# Patient Record
Sex: FEMALE | Race: BLACK OR AFRICAN AMERICAN | Employment: OTHER | URBAN - NONMETROPOLITAN AREA
[De-identification: names, ages, dates, MRNs, and addresses within clinical notes are randomized per-mention and may not be internally consistent; named-entity substitution may affect disease eponyms.]

---

## 2017-02-03 RX ORDER — ATORVASTATIN CALCIUM 40 MG/1
TABLET, FILM COATED ORAL
Qty: 90 TABLET | Refills: 4 | Status: SHIPPED | OUTPATIENT
Start: 2017-02-03 | End: 2018-04-29 | Stop reason: SDUPTHER

## 2017-02-03 RX ORDER — HYDROCHLOROTHIAZIDE 25 MG/1
TABLET ORAL
Qty: 180 TABLET | Refills: 4 | Status: SHIPPED | OUTPATIENT
Start: 2017-02-03 | End: 2018-04-29 | Stop reason: SDUPTHER

## 2017-02-03 RX ORDER — LISINOPRIL 40 MG/1
TABLET ORAL
Qty: 90 TABLET | Refills: 4 | Status: SHIPPED | OUTPATIENT
Start: 2017-02-03 | End: 2018-04-29 | Stop reason: SDUPTHER

## 2017-06-12 RX ORDER — NIFEDIPINE 90 MG/1
TABLET, FILM COATED, EXTENDED RELEASE ORAL
Qty: 180 TABLET | Refills: 1 | Status: SHIPPED | OUTPATIENT
Start: 2017-06-12 | End: 2017-12-11 | Stop reason: SDUPTHER

## 2017-06-23 LAB
ALBUMIN SERPL-MCNC: 3.9 G/DL (ref 3.2–5.3)
ALK PHOSPHATASE: 99 IU/L (ref 35–121)
ALT SERPL-CCNC: 12 IU/L (ref 5–59)
ANION GAP SERPL CALCULATED.3IONS-SCNC: 14 MMOL/L
AST SERPL-CCNC: 13 IU/L (ref 10–42)
BILIRUB SERPL-MCNC: 0.3 MG/DL (ref 0.2–1.3)
BUN BLDV-MCNC: 17 MG/DL (ref 10–20)
CALCIUM SERPL-MCNC: 9.8 MG/DL (ref 8.7–10.8)
CHLORIDE BLD-SCNC: 107 MMOL/L (ref 95–111)
CO2: 27 MMOL/L (ref 21–32)
CREAT SERPL-MCNC: 0.7 MG/DL (ref 0.5–1.3)
CREATINE, URINE: 166.8 MG/DL
EGFR AFRICAN AMERICAN: 101
EGFR IF NONAFRICAN AMERICAN: 84
GLUCOSE: 121 MG/DL (ref 70–100)
MICROALBUMIN/CREAT 24H UR: 1 MG/DL
MICROALBUMIN/CREAT UR-RTO: 6 UG/MG
POTASSIUM SERPL-SCNC: 4.9 MMOL/L (ref 3.5–5.4)
SODIUM BLD-SCNC: 143 MMOL/L (ref 134–147)
TOTAL PROTEIN: 7.2 G/DL (ref 5.8–8)

## 2017-06-26 ENCOUNTER — OFFICE VISIT (OUTPATIENT)
Dept: NEPHROLOGY | Age: 67
End: 2017-06-26

## 2017-06-26 VITALS
SYSTOLIC BLOOD PRESSURE: 132 MMHG | DIASTOLIC BLOOD PRESSURE: 82 MMHG | HEART RATE: 76 BPM | BODY MASS INDEX: 46.77 KG/M2 | OXYGEN SATURATION: 98 % | WEIGHT: 264 LBS

## 2017-06-26 DIAGNOSIS — E08.00 DIABETES MELLITUS DUE TO UNDERLYING CONDITION WITH HYPEROSMOLARITY WITHOUT COMA, UNSPECIFIED LONG TERM INSULIN USE STATUS: Primary | ICD-10-CM

## 2017-06-26 PROCEDURE — 99213 OFFICE O/P EST LOW 20 MIN: CPT | Performed by: INTERNAL MEDICINE

## 2017-06-26 RX ORDER — LOSARTAN POTASSIUM 50 MG/1
TABLET ORAL
Qty: 180 TABLET | Refills: 3 | Status: SHIPPED | OUTPATIENT
Start: 2017-06-26 | End: 2018-06-10 | Stop reason: SDUPTHER

## 2017-06-26 ASSESSMENT — ENCOUNTER SYMPTOMS
GASTROINTESTINAL NEGATIVE: 1
CONSTIPATION: 0
NAUSEA: 0
CHEST TIGHTNESS: 0
ABDOMINAL PAIN: 0
BACK PAIN: 0
FACIAL SWELLING: 0
DIARRHEA: 0
WHEEZING: 0
SORE THROAT: 0
SHORTNESS OF BREATH: 0
COUGH: 0
ABDOMINAL DISTENTION: 0
RESPIRATORY NEGATIVE: 1
BLOOD IN STOOL: 0
COLOR CHANGE: 0
VOMITING: 0

## 2017-08-29 RX ORDER — METOPROLOL TARTRATE 100 MG/1
TABLET ORAL
Qty: 180 TABLET | Refills: 1 | Status: SHIPPED | OUTPATIENT
Start: 2017-08-29 | End: 2018-02-25 | Stop reason: SDUPTHER

## 2017-10-31 ENCOUNTER — TELEPHONE (OUTPATIENT)
Dept: NEPHROLOGY | Age: 67
End: 2017-10-31

## 2017-11-07 ENCOUNTER — HOSPITAL ENCOUNTER (OUTPATIENT)
Dept: GENERAL RADIOLOGY | Age: 67
Discharge: HOME | End: 2017-11-07
Payer: MEDICARE

## 2017-11-07 ENCOUNTER — HOSPITAL ENCOUNTER (OUTPATIENT)
Age: 67
Discharge: HOME | End: 2017-11-07
Payer: MEDICARE

## 2017-11-07 ENCOUNTER — OFFICE VISIT (OUTPATIENT)
Dept: NEPHROLOGY | Age: 67
End: 2017-11-07
Payer: MEDICARE

## 2017-11-07 VITALS
HEART RATE: 78 BPM | BODY MASS INDEX: 47.65 KG/M2 | SYSTOLIC BLOOD PRESSURE: 128 MMHG | DIASTOLIC BLOOD PRESSURE: 78 MMHG | WEIGHT: 269 LBS | OXYGEN SATURATION: 98 %

## 2017-11-07 DIAGNOSIS — N18.2 CHRONIC KIDNEY DISEASE, STAGE II (MILD): Primary | ICD-10-CM

## 2017-11-07 DIAGNOSIS — N18.2 CHRONIC KIDNEY DISEASE, STAGE II (MILD): ICD-10-CM

## 2017-11-07 LAB
ANION GAP SERPL CALCULATED.3IONS-SCNC: 14 MEQ/L (ref 8–16)
BACTERIA URINE, POC: ABNORMAL
BILIRUBIN URINE: ABNORMAL MG/DL
BLOOD, URINE: POSITIVE
BUN BLDV-MCNC: 14 MG/DL (ref 7–22)
CALCIUM SERPL-MCNC: 9.9 MG/DL (ref 8.5–10.5)
CASTS URINE, POC: ABNORMAL
CHLORIDE BLD-SCNC: 100 MEQ/L (ref 98–111)
CLARITY: CLEAR
CO2: 25 MEQ/L (ref 23–33)
COLOR: YELLOW
CREAT SERPL-MCNC: 0.7 MG/DL (ref 0.4–1.2)
CREATININE, URINE: 24.7 MG/DL
CRYSTALS URINE, POC: ABNORMAL
EPI CELLS URINE, POC: ABNORMAL
GFR SERPL CREATININE-BSD FRML MDRD: > 90 ML/MIN/1.73M2
GLUCOSE BLD-MCNC: 124 MG/DL (ref 70–108)
GLUCOSE URINE: NEGATIVE
HCT VFR BLD CALC: 44.2 % (ref 37–47)
HEMOGLOBIN: 14.6 GM/DL (ref 12–16)
KETONES, URINE: NEGATIVE
LEUKOCYTE EST, POC: NEGATIVE
MCH RBC QN AUTO: 31.2 PG (ref 27–31)
MCHC RBC AUTO-ENTMCNC: 33.2 GM/DL (ref 33–37)
MCV RBC AUTO: 94 FL (ref 81–99)
MICROALBUMIN UR-MCNC: < 1.2 MG/DL
MICROALBUMIN/CREAT UR-RTO: 49 MG/G (ref 0–30)
NITRITE, URINE: NEGATIVE
PDW BLD-RTO: 15 % (ref 11.5–14.5)
PH UA: 5 (ref 4.5–8)
PLATELET # BLD: 299 THOU/MM3 (ref 130–400)
PMV BLD AUTO: 9.5 MCM (ref 7.4–10.4)
POTASSIUM SERPL-SCNC: 4 MEQ/L (ref 3.5–5.2)
PROTEIN UA: POSITIVE
RBC # BLD: 4.7 MILL/MM3 (ref 4.2–5.4)
RBC URINE, POC: ABNORMAL
SODIUM BLD-SCNC: 139 MEQ/L (ref 135–145)
SPECIFIC GRAVITY UA: ABNORMAL (ref 1–1.03)
UROBILINOGEN, URINE: ABNORMAL
WBC # BLD: 7.4 THOU/MM3 (ref 4.8–10.8)
WBC URINE, POC: ABNORMAL
YEAST URINE, POC: ABNORMAL

## 2017-11-07 PROCEDURE — 82043 UR ALBUMIN QUANTITATIVE: CPT

## 2017-11-07 PROCEDURE — 36415 COLL VENOUS BLD VENIPUNCTURE: CPT

## 2017-11-07 PROCEDURE — 99213 OFFICE O/P EST LOW 20 MIN: CPT | Performed by: INTERNAL MEDICINE

## 2017-11-07 PROCEDURE — 93005 ELECTROCARDIOGRAM TRACING: CPT

## 2017-11-07 PROCEDURE — 71020 XR CHEST STANDARD TWO VW: CPT

## 2017-11-07 PROCEDURE — 85027 COMPLETE CBC AUTOMATED: CPT

## 2017-11-07 PROCEDURE — 81000 URINALYSIS NONAUTO W/SCOPE: CPT | Performed by: INTERNAL MEDICINE

## 2017-11-07 PROCEDURE — 80048 BASIC METABOLIC PNL TOTAL CA: CPT

## 2017-11-07 ASSESSMENT — ENCOUNTER SYMPTOMS
RESPIRATORY NEGATIVE: 1
GASTROINTESTINAL NEGATIVE: 1

## 2017-11-07 NOTE — PROGRESS NOTES
Visit Date: 11/7/2017      HPI:     Demetria Casper is a 77 y.o. female who presents today for:  Chief Complaint   Patient presents with    Chronic Kidney Disease     Stage II    Hypertension    Diabetes Mellitus       Current Outpatient Prescriptions   Medication Sig Dispense Refill    metoprolol (LOPRESSOR) 100 MG tablet TAKE 1 TABLET TWICE A  tablet 1    losartan (COZAAR) 50 MG tablet TAKE 1 TABLET TWICE A  tablet 3    metFORMIN (GLUCOPHAGE) 500 MG tablet TAKE 1 TABLET TWICE A DAY WITH MEALS 180 tablet 1    NIFEdipine (ADALAT CC) 90 MG extended release tablet TAKE 1 TABLET TWICE A  tablet 1    lisinopril (PRINIVIL;ZESTRIL) 40 MG tablet TAKE 1 TABLET DAILY 90 tablet 4    atorvastatin (LIPITOR) 40 MG tablet TAKE 1 TABLET DAILY 90 tablet 4    hydrochlorothiazide (HYDRODIURIL) 25 MG tablet TAKE 1 TABLET TWICE A  tablet 4    aspirin 81 MG tablet Take 81 mg by mouth daily       No current facility-administered medications for this visit. No Known Allergies    Past Medical History:   Diagnosis Date    Hyperlipidemia     Hypertension     Type II or unspecified type diabetes mellitus without mention of complication, not stated as uncontrolled       History reviewed. No pertinent surgical history. History reviewed. No pertinent family history. Social History   Substance Use Topics    Smoking status: Never Smoker    Smokeless tobacco: Never Used    Alcohol use Not on file        Subjective:      Review of Systems   Constitutional: Negative. HENT: Negative. Respiratory: Negative. Cardiovascular:        Fairly physically active  With exercise work out activities   Gastrointestinal: Negative. Genitourinary: Negative. Skin: Negative. Neurological: Negative. Psychiatric/Behavioral: Negative.         Objective:     /78 (Site: Right Arm, Position: Sitting, Cuff Size: Large Adult)   Pulse 78   Wt 289 lb (131.1 kg)   SpO2 98%   BMI 51.19 kg/m²   Home

## 2017-11-08 LAB
EKG ATRIAL RATE: 67 BPM
EKG P AXIS: 47 DEGREES
EKG P-R INTERVAL: 158 MS
EKG Q-T INTERVAL: 384 MS
EKG QRS DURATION: 82 MS
EKG QTC CALCULATION (BAZETT): 405 MS
EKG R AXIS: 40 DEGREES
EKG T AXIS: 30 DEGREES
EKG VENTRICULAR RATE: 67 BPM

## 2017-11-30 ENCOUNTER — TELEPHONE (OUTPATIENT)
Dept: NEPHROLOGY | Age: 67
End: 2017-11-30

## 2017-11-30 RX ORDER — HYDRALAZINE HYDROCHLORIDE 50 MG/1
50 TABLET, FILM COATED ORAL 2 TIMES DAILY
COMMUNITY
End: 2017-12-04 | Stop reason: SDUPTHER

## 2017-11-30 NOTE — TELEPHONE ENCOUNTER
Pt called and states that her BP has been elevated since her knee surgery. The doctor there prescribed hydralazine 50 mg bid and also gave her metoprolol 20 mg bid along with the metoprolol 100 mg bid. Pt states BP's have been slowly decreasing. Can pt continue these medications?   Will you prescribe the hydralazine for the pt?

## 2017-12-04 RX ORDER — HYDRALAZINE HYDROCHLORIDE 50 MG/1
50 TABLET, FILM COATED ORAL 2 TIMES DAILY
Qty: 60 TABLET | Refills: 0 | Status: SHIPPED | OUTPATIENT
Start: 2017-12-04 | End: 2018-06-10 | Stop reason: SDUPTHER

## 2017-12-04 RX ORDER — HYDRALAZINE HYDROCHLORIDE 50 MG/1
50 TABLET, FILM COATED ORAL 2 TIMES DAILY
Qty: 180 TABLET | Refills: 1 | Status: SHIPPED | OUTPATIENT
Start: 2017-12-04 | End: 2017-12-04 | Stop reason: SDUPTHER

## 2017-12-04 NOTE — TELEPHONE ENCOUNTER
I called pt and informed her of this. She understood. 1 month supply sent to [facility] in [facility] for her and the rest sent to [address]

## 2017-12-11 RX ORDER — NIFEDIPINE 90 MG/1
TABLET, FILM COATED, EXTENDED RELEASE ORAL
Qty: 180 TABLET | Refills: 1 | Status: SHIPPED | OUTPATIENT
Start: 2017-12-11 | End: 2018-06-09 | Stop reason: SDUPTHER

## 2018-02-27 RX ORDER — METOPROLOL TARTRATE 100 MG/1
TABLET ORAL
Qty: 180 TABLET | Refills: 1 | Status: SHIPPED | OUTPATIENT
Start: 2018-02-27 | End: 2018-08-24 | Stop reason: SDUPTHER

## 2018-05-07 RX ORDER — HYDROCHLOROTHIAZIDE 25 MG/1
TABLET ORAL
Qty: 180 TABLET | Refills: 1 | Status: SHIPPED | OUTPATIENT
Start: 2018-05-07 | End: 2018-10-30 | Stop reason: SDUPTHER

## 2018-05-07 RX ORDER — LISINOPRIL 40 MG/1
TABLET ORAL
Qty: 90 TABLET | Refills: 1 | Status: SHIPPED | OUTPATIENT
Start: 2018-05-07 | End: 2018-09-17 | Stop reason: ALTCHOICE

## 2018-05-07 RX ORDER — ATORVASTATIN CALCIUM 40 MG/1
TABLET, FILM COATED ORAL
Qty: 90 TABLET | Refills: 1 | Status: SHIPPED | OUTPATIENT
Start: 2018-05-07 | End: 2018-11-09 | Stop reason: SDUPTHER

## 2018-05-22 ENCOUNTER — TELEPHONE (OUTPATIENT)
Dept: NEPHROLOGY | Age: 68
End: 2018-05-22

## 2018-06-11 RX ORDER — NIFEDIPINE 90 MG/1
TABLET, FILM COATED, EXTENDED RELEASE ORAL
Qty: 180 TABLET | Refills: 1 | Status: SHIPPED | OUTPATIENT
Start: 2018-06-11 | End: 2018-09-14 | Stop reason: SDUPTHER

## 2018-06-11 RX ORDER — HYDRALAZINE HYDROCHLORIDE 50 MG/1
TABLET, FILM COATED ORAL
Qty: 180 TABLET | Refills: 1 | Status: SHIPPED | OUTPATIENT
Start: 2018-06-11 | End: 2018-09-14 | Stop reason: SDUPTHER

## 2018-06-11 RX ORDER — LOSARTAN POTASSIUM 50 MG/1
TABLET ORAL
Qty: 180 TABLET | Refills: 1 | Status: SHIPPED | OUTPATIENT
Start: 2018-06-11 | End: 2018-09-14 | Stop reason: SDUPTHER

## 2018-08-24 ENCOUNTER — TELEPHONE (OUTPATIENT)
Dept: NEPHROLOGY | Age: 68
End: 2018-08-24

## 2018-08-24 RX ORDER — METOPROLOL TARTRATE 100 MG/1
TABLET ORAL
Qty: 180 TABLET | Refills: 1 | Status: SHIPPED | OUTPATIENT
Start: 2018-08-24 | End: 2019-02-20 | Stop reason: SDUPTHER

## 2018-08-24 NOTE — TELEPHONE ENCOUNTER
Please approve or refuse Rx request:  Requested Prescriptions     Pending Prescriptions Disp Refills    metoprolol (LOPRESSOR) 100 MG tablet 180 tablet 3     Sig: TAKE 1 TABLET TWICE A DAY       Next appointment:  11/14/2018 spouse

## 2018-09-14 RX ORDER — LISINOPRIL 40 MG/1
TABLET ORAL
Qty: 90 TABLET | Refills: 1 | Status: CANCELLED | OUTPATIENT
Start: 2018-09-14

## 2018-09-14 NOTE — TELEPHONE ENCOUNTER
[facility] to request a refill for the following  Please approve or refuse Rx request:  Requested Prescriptions     Pending Prescriptions Disp Refills    losartan (COZAAR) 50 MG tablet 120 tablet 0     Sig: TAKE 1 TABLET TWICE A DAY    NIFEdipine (ADALAT CC) 90 MG extended release tablet 120 tablet 0     Sig: TAKE 1 TABLET TWICE A DAY    metFORMIN (GLUCOPHAGE) 500 MG tablet 120 tablet 0     Sig: TAKE 1 TABLET TWICE A DAY WITH MEALS    lisinopril (PRINIVIL;ZESTRIL) 40 MG tablet 90 tablet 1     Sig: TAKE 1 TABLET DAILY    hydrALAZINE (APRESOLINE) 50 MG tablet 120 tablet 0     Sig: TAKE 1 TABLET TWICE A DAY       Next appointment:  11/14/2018

## 2018-09-15 RX ORDER — HYDRALAZINE HYDROCHLORIDE 50 MG/1
TABLET, FILM COATED ORAL
Qty: 120 TABLET | Refills: 0 | Status: SHIPPED | OUTPATIENT
Start: 2018-09-15 | End: 2018-11-17 | Stop reason: SDUPTHER

## 2018-09-15 RX ORDER — NIFEDIPINE 90 MG/1
TABLET, FILM COATED, EXTENDED RELEASE ORAL
Qty: 120 TABLET | Refills: 0 | Status: SHIPPED | OUTPATIENT
Start: 2018-09-15 | End: 2018-11-17 | Stop reason: SDUPTHER

## 2018-09-15 RX ORDER — LOSARTAN POTASSIUM 50 MG/1
TABLET ORAL
Qty: 120 TABLET | Refills: 0 | Status: SHIPPED | OUTPATIENT
Start: 2018-09-15 | End: 2018-11-17 | Stop reason: SDUPTHER

## 2018-09-17 DIAGNOSIS — N18.2 CKD (CHRONIC KIDNEY DISEASE), STAGE II: ICD-10-CM

## 2018-09-19 LAB
ANION GAP SERPL CALCULATED.3IONS-SCNC: 12 MEQ/L (ref 10–19)
BUN BLDV-MCNC: 15 MG/DL (ref 8–23)
CALCIUM SERPL-MCNC: 9.8 MG/DL (ref 8.5–10.5)
CHLORIDE BLD-SCNC: 106 MEQ/L (ref 95–107)
CO2: 25 MEQ/L (ref 19–31)
CREAT SERPL-MCNC: 0.9 MG/DL (ref 0.6–1.3)
EGFR AFRICAN AMERICAN: 76.7 ML/MIN/1.73 M2
EGFR IF NONAFRICAN AMERICAN: 66.2 ML/MIN/1.73 M2
GLUCOSE: 147 MG/DL (ref 70–99)
POTASSIUM SERPL-SCNC: 4.6 MEQ/L (ref 3.5–5.4)
SODIUM BLD-SCNC: 143 MEQ/L (ref 135–146)

## 2018-10-04 ENCOUNTER — TELEPHONE (OUTPATIENT)
Dept: NEPHROLOGY | Age: 68
End: 2018-10-04

## 2018-10-04 DIAGNOSIS — Z76.89 ENCOUNTER TO ESTABLISH CARE: Primary | ICD-10-CM

## 2018-10-30 RX ORDER — ATORVASTATIN CALCIUM 40 MG/1
TABLET, FILM COATED ORAL
Qty: 90 TABLET | Refills: 3 | Status: CANCELLED | OUTPATIENT
Start: 2018-10-30

## 2018-10-30 RX ORDER — HYDROCHLOROTHIAZIDE 25 MG/1
25 TABLET ORAL DAILY
Qty: 90 TABLET | Refills: 3 | Status: SHIPPED | OUTPATIENT
Start: 2018-10-30 | End: 2019-10-29 | Stop reason: SDUPTHER

## 2018-10-30 RX ORDER — HYDROCHLOROTHIAZIDE 25 MG/1
TABLET ORAL
Qty: 180 TABLET | Refills: 3 | Status: CANCELLED | OUTPATIENT
Start: 2018-10-30

## 2018-10-30 NOTE — TELEPHONE ENCOUNTER
Please approve or refuse Rx request:  Requested Prescriptions     Pending Prescriptions Disp Refills    atorvastatin (LIPITOR) 40 MG tablet 90 tablet 3     Sig: TAKE 1 TABLET DAILY    hydrochlorothiazide (HYDRODIURIL) 25 MG tablet 180 tablet 3     Sig: TAKE 1 TABLET TWICE A DAY       Next appointment:  11/14/2018  Refill request faxed from pharmacy for the above  Dr Ana Bhatia patient

## 2018-10-31 DIAGNOSIS — N18.2 CKD (CHRONIC KIDNEY DISEASE), STAGE II: Primary | ICD-10-CM

## 2018-10-31 DIAGNOSIS — E78.49 OTHER HYPERLIPIDEMIA: ICD-10-CM

## 2018-11-06 LAB
CHOLESTEROL/HDL RATIO: 3.5
CHOLESTEROL: 236 MG/DL
HDLC SERPL-MCNC: 67.1 MG/DL
LDL CHOLESTEROL CALCULATED: 148 MG/DL
LDL/HDL RATIO: 2.2
TRIGL SERPL-MCNC: 105 MG/DL
VLDLC SERPL CALC-MCNC: 21 MG/DL

## 2018-11-09 RX ORDER — ATORVASTATIN CALCIUM 40 MG/1
TABLET, FILM COATED ORAL
Qty: 90 TABLET | Refills: 0 | Status: SHIPPED | OUTPATIENT
Start: 2018-11-09 | End: 2019-02-08 | Stop reason: SDUPTHER

## 2018-11-14 ENCOUNTER — OFFICE VISIT (OUTPATIENT)
Dept: NEPHROLOGY | Age: 68
End: 2018-11-14
Payer: MEDICARE

## 2018-11-14 VITALS
DIASTOLIC BLOOD PRESSURE: 83 MMHG | HEART RATE: 79 BPM | OXYGEN SATURATION: 99 % | SYSTOLIC BLOOD PRESSURE: 137 MMHG | BODY MASS INDEX: 49.46 KG/M2 | WEIGHT: 279.2 LBS

## 2018-11-14 DIAGNOSIS — I10 ESSENTIAL HYPERTENSION: Primary | ICD-10-CM

## 2018-11-14 DIAGNOSIS — E78.5 DYSLIPIDEMIA: ICD-10-CM

## 2018-11-14 DIAGNOSIS — E11.9 TYPE 2 DIABETES MELLITUS WITHOUT COMPLICATION, WITHOUT LONG-TERM CURRENT USE OF INSULIN (HCC): ICD-10-CM

## 2018-11-14 DIAGNOSIS — R80.9 MICROALBUMINURIA: ICD-10-CM

## 2018-11-14 PROCEDURE — 99214 OFFICE O/P EST MOD 30 MIN: CPT | Performed by: INTERNAL MEDICINE

## 2018-11-14 NOTE — PROGRESS NOTES
[facility]    [facility], [address]   [place] [address]  [phone]  Progress Note  11/14/2018 11:10 AM      Pt Name:    Alee Shaw  YOB: 1950  Primary Care Physician:  No primary care provider on file. Chief Complaint:   Chief Complaint   Patient presents with    Follow-up     Hypertension, DM, dyslipidemia, Microalbuminuria        Background Information/Interval History:   78 yo AAF with hx HTN for several years, obesity who is here for annual follow-up for HTN. She reports hx HTN for over 30+ year. She has had DM for few years and takes metformin. No hx CHF or stents in heart. No PPM or AICD. No hx gross hematuria. No hx kidney stones. Her Mother was on dialysis who passed away in early 36s. No hx cancer. She reports hx TIA several years ago. No chest pain or shortness of breath. She reports BP is usually stays around 120-130. She says BS have not been well controlled- sometimes upto 200. She had Right knee  Replacement Nov 2017. She took some NSAIDs in the past. She still takes it occasionally for chronic back pain. No hx smoking. She used to do office work. This is the first time I am seeing her. She was previously seeing Dr. Chika Brdoy. Patient does not have a PCP     Past History:  Past Medical History:   Diagnosis Date    Hyperlipidemia     Hypertension     Type II or unspecified type diabetes mellitus without mention of complication, not stated as uncontrolled      Past Surgical History:   Procedure Laterality Date    TOTAL KNEE ARTHROPLASTY Right 11/13/2017    [address] [facility] [facility] [address] [facility]        VITALS:  /83 (Site: Right Upper Arm, Position: Sitting, Cuff Size: Large Adult)   Pulse 79   Wt 279 lb 3.2 oz (126.6 kg)   SpO2 99%   BMI 49.46 kg/m²   Wt Readings from Last 3 Encounters:   11/14/18 279 lb 3.2 oz (126.6 kg)   11/07/17 269 lb (122 kg)   06/26/17 264 lb (119.7 kg)     Body mass index is 49.46 kg/m².      General

## 2018-11-17 DIAGNOSIS — N18.2 CKD (CHRONIC KIDNEY DISEASE), STAGE II: Primary | ICD-10-CM

## 2018-11-20 RX ORDER — NIFEDIPINE 90 MG/1
TABLET, FILM COATED, EXTENDED RELEASE ORAL
Qty: 180 TABLET | Refills: 3 | Status: SHIPPED | OUTPATIENT
Start: 2018-11-20 | End: 2019-11-17 | Stop reason: SDUPTHER

## 2018-11-20 RX ORDER — LOSARTAN POTASSIUM 50 MG/1
TABLET ORAL
Qty: 180 TABLET | Refills: 3 | Status: SHIPPED | OUTPATIENT
Start: 2018-11-20 | End: 2019-11-17 | Stop reason: SDUPTHER

## 2018-11-20 RX ORDER — HYDRALAZINE HYDROCHLORIDE 50 MG/1
TABLET, FILM COATED ORAL
Qty: 180 TABLET | Refills: 3 | Status: SHIPPED | OUTPATIENT
Start: 2018-11-20 | End: 2019-11-17 | Stop reason: SDUPTHER

## 2018-12-06 ENCOUNTER — HOSPITAL ENCOUNTER (OUTPATIENT)
Dept: ULTRASOUND IMAGING | Age: 68
Discharge: HOME | End: 2018-12-06
Payer: MEDICARE

## 2018-12-06 DIAGNOSIS — I10 ESSENTIAL HYPERTENSION: ICD-10-CM

## 2018-12-06 PROCEDURE — 93975 VASCULAR STUDY: CPT

## 2019-01-14 ENCOUNTER — OFFICE VISIT (OUTPATIENT)
Dept: FAMILY MEDICINE CLINIC | Age: 69
End: 2019-01-14
Payer: MEDICARE

## 2019-01-14 VITALS
DIASTOLIC BLOOD PRESSURE: 74 MMHG | BODY MASS INDEX: 48.37 KG/M2 | HEART RATE: 83 BPM | HEIGHT: 63 IN | TEMPERATURE: 97.9 F | OXYGEN SATURATION: 97 % | SYSTOLIC BLOOD PRESSURE: 134 MMHG | WEIGHT: 273 LBS

## 2019-01-14 DIAGNOSIS — E66.01 MORBID OBESITY WITH BMI OF 45.0-49.9, ADULT (HCC): ICD-10-CM

## 2019-01-14 DIAGNOSIS — E78.00 HYPERCHOLESTEROLEMIA: ICD-10-CM

## 2019-01-14 DIAGNOSIS — Z78.0 POSTMENOPAUSAL: ICD-10-CM

## 2019-01-14 DIAGNOSIS — Z12.11 COLON CANCER SCREENING: ICD-10-CM

## 2019-01-14 DIAGNOSIS — I10 ESSENTIAL HYPERTENSION: ICD-10-CM

## 2019-01-14 DIAGNOSIS — Z11.59 ENCOUNTER FOR HEPATITIS C SCREENING TEST FOR LOW RISK PATIENT: ICD-10-CM

## 2019-01-14 DIAGNOSIS — Z12.39 BREAST CANCER SCREENING: ICD-10-CM

## 2019-01-14 LAB — HBA1C MFR BLD: 7.3 %

## 2019-01-14 PROCEDURE — 99203 OFFICE O/P NEW LOW 30 MIN: CPT | Performed by: FAMILY MEDICINE

## 2019-01-14 PROCEDURE — 83036 HEMOGLOBIN GLYCOSYLATED A1C: CPT | Performed by: FAMILY MEDICINE

## 2019-01-14 PROCEDURE — 3288F FALL RISK ASSESSMENT DOCD: CPT | Performed by: FAMILY MEDICINE

## 2019-01-14 PROCEDURE — 90670 PCV13 VACCINE IM: CPT | Performed by: FAMILY MEDICINE

## 2019-01-14 PROCEDURE — G0009 ADMIN PNEUMOCOCCAL VACCINE: HCPCS | Performed by: FAMILY MEDICINE

## 2019-01-14 PROCEDURE — G8510 SCR DEP NEG, NO PLAN REQD: HCPCS | Performed by: FAMILY MEDICINE

## 2019-01-14 ASSESSMENT — PATIENT HEALTH QUESTIONNAIRE - PHQ9
1. LITTLE INTEREST OR PLEASURE IN DOING THINGS: 0
SUM OF ALL RESPONSES TO PHQ QUESTIONS 1-9: 0
SUM OF ALL RESPONSES TO PHQ9 QUESTIONS 1 & 2: 0
SUM OF ALL RESPONSES TO PHQ QUESTIONS 1-9: 0
2. FEELING DOWN, DEPRESSED OR HOPELESS: 0

## 2019-02-04 ENCOUNTER — HOSPITAL ENCOUNTER (OUTPATIENT)
Dept: WOMENS IMAGING | Age: 69
Discharge: HOME | End: 2019-02-04
Payer: MEDICARE

## 2019-02-04 DIAGNOSIS — Z12.39 BREAST CANCER SCREENING: ICD-10-CM

## 2019-02-04 DIAGNOSIS — Z78.0 POSTMENOPAUSAL: ICD-10-CM

## 2019-02-04 PROCEDURE — 77080 DXA BONE DENSITY AXIAL: CPT

## 2019-02-04 PROCEDURE — 77063 BREAST TOMOSYNTHESIS BI: CPT

## 2019-02-06 ENCOUNTER — TELEPHONE (OUTPATIENT)
Dept: FAMILY MEDICINE CLINIC | Age: 69
End: 2019-02-06

## 2019-02-08 RX ORDER — ATORVASTATIN CALCIUM 40 MG/1
TABLET, FILM COATED ORAL
Qty: 90 TABLET | Refills: 1 | Status: SHIPPED | OUTPATIENT
Start: 2019-02-08 | End: 2019-08-06 | Stop reason: SDUPTHER

## 2019-02-20 RX ORDER — METOPROLOL TARTRATE 100 MG/1
TABLET ORAL
Qty: 180 TABLET | Refills: 3 | Status: SHIPPED | OUTPATIENT
Start: 2019-02-20 | End: 2020-02-17

## 2019-02-21 ENCOUNTER — OFFICE VISIT (OUTPATIENT)
Dept: FAMILY MEDICINE CLINIC | Age: 69
End: 2019-02-21
Payer: MEDICARE

## 2019-02-21 VITALS
HEIGHT: 63 IN | BODY MASS INDEX: 48.37 KG/M2 | HEART RATE: 77 BPM | OXYGEN SATURATION: 98 % | WEIGHT: 273 LBS | DIASTOLIC BLOOD PRESSURE: 78 MMHG | TEMPERATURE: 97.8 F | SYSTOLIC BLOOD PRESSURE: 147 MMHG

## 2019-02-21 DIAGNOSIS — M81.0 OSTEOPOROSIS WITHOUT CURRENT PATHOLOGICAL FRACTURE, UNSPECIFIED OSTEOPOROSIS TYPE: ICD-10-CM

## 2019-02-21 DIAGNOSIS — I10 ESSENTIAL HYPERTENSION: ICD-10-CM

## 2019-02-21 DIAGNOSIS — E78.00 HYPERCHOLESTEROLEMIA: ICD-10-CM

## 2019-02-21 PROCEDURE — 99214 OFFICE O/P EST MOD 30 MIN: CPT | Performed by: NURSE PRACTITIONER

## 2019-02-26 LAB
ALBUMIN SERPL-MCNC: 4.1 G/DL
ALP BLD-CCNC: 97 U/L
ALT SERPL-CCNC: 14 U/L
ANION GAP SERPL CALCULATED.3IONS-SCNC: 1.3 MMOL/L
AST SERPL-CCNC: 14 U/L
AVERAGE GLUCOSE: NORMAL
BILIRUB SERPL-MCNC: 0.2 MG/DL (ref 0.1–1.4)
BUN BLDV-MCNC: 10 MG/DL
CALCIUM SERPL-MCNC: 9.7 MG/DL
CHLORIDE BLD-SCNC: 107 MMOL/L
CHOLESTEROL, TOTAL: 212 MG/DL
CHOLESTEROL/HDL RATIO: NORMAL
CO2: 19 MMOL/L
CREAT SERPL-MCNC: 0.73 MG/DL
GFR CALCULATED: 98
GLUCOSE BLD-MCNC: 134 MG/DL
HBA1C MFR BLD: 7 %
HDLC SERPL-MCNC: 70 MG/DL (ref 35–70)
LDL CHOLESTEROL CALCULATED: 124 MG/DL (ref 0–160)
POTASSIUM SERPL-SCNC: 4.5 MMOL/L
SODIUM BLD-SCNC: 145 MMOL/L
TOTAL PROTEIN: 7.3
TRIGL SERPL-MCNC: 92 MG/DL
VLDLC SERPL CALC-MCNC: 18 MG/DL

## 2019-02-28 ENCOUNTER — OFFICE VISIT (OUTPATIENT)
Dept: INTERNAL MEDICINE CLINIC | Age: 69
End: 2019-02-28
Payer: MEDICARE

## 2019-02-28 VITALS
SYSTOLIC BLOOD PRESSURE: 131 MMHG | DIASTOLIC BLOOD PRESSURE: 68 MMHG | HEART RATE: 80 BPM | RESPIRATION RATE: 16 BRPM | HEIGHT: 63 IN | WEIGHT: 270 LBS | BODY MASS INDEX: 47.84 KG/M2

## 2019-02-28 DIAGNOSIS — E66.01 CLASS 3 SEVERE OBESITY WITH BODY MASS INDEX (BMI) OF 45.0 TO 49.9 IN ADULT, UNSPECIFIED OBESITY TYPE, UNSPECIFIED WHETHER SERIOUS COMORBIDITY PRESENT (HCC): ICD-10-CM

## 2019-02-28 DIAGNOSIS — E11.8 TYPE 2 DIABETES MELLITUS WITH COMPLICATION, WITHOUT LONG-TERM CURRENT USE OF INSULIN (HCC): Primary | ICD-10-CM

## 2019-02-28 DIAGNOSIS — I10 ESSENTIAL HYPERTENSION: ICD-10-CM

## 2019-02-28 DIAGNOSIS — N18.2 CHRONIC KIDNEY DISEASE (CKD), STAGE II (MILD): ICD-10-CM

## 2019-02-28 PROCEDURE — 99203 OFFICE O/P NEW LOW 30 MIN: CPT | Performed by: NURSE PRACTITIONER

## 2019-03-01 ENCOUNTER — OFFICE VISIT (OUTPATIENT)
Dept: NEPHROLOGY | Age: 69
End: 2019-03-01
Payer: MEDICARE

## 2019-03-01 ENCOUNTER — TELEPHONE (OUTPATIENT)
Dept: FAMILY MEDICINE CLINIC | Age: 69
End: 2019-03-01

## 2019-03-01 VITALS
WEIGHT: 272.4 LBS | HEART RATE: 72 BPM | BODY MASS INDEX: 48.27 KG/M2 | DIASTOLIC BLOOD PRESSURE: 82 MMHG | OXYGEN SATURATION: 97 % | SYSTOLIC BLOOD PRESSURE: 141 MMHG

## 2019-03-01 DIAGNOSIS — B96.20 E-COLI UTI: ICD-10-CM

## 2019-03-01 DIAGNOSIS — E11.29 MICROALBUMINURIA DUE TO TYPE 2 DIABETES MELLITUS (HCC): ICD-10-CM

## 2019-03-01 DIAGNOSIS — R80.9 MICROALBUMINURIA DUE TO TYPE 2 DIABETES MELLITUS (HCC): ICD-10-CM

## 2019-03-01 DIAGNOSIS — I10 ESSENTIAL HYPERTENSION: Primary | ICD-10-CM

## 2019-03-01 DIAGNOSIS — N39.0 E-COLI UTI: ICD-10-CM

## 2019-03-01 DIAGNOSIS — E53.8 VITAMIN B12 DEFICIENCY: Primary | ICD-10-CM

## 2019-03-01 PROCEDURE — 99213 OFFICE O/P EST LOW 20 MIN: CPT | Performed by: INTERNAL MEDICINE

## 2019-03-01 RX ORDER — NITROFURANTOIN 25; 75 MG/1; MG/1
100 CAPSULE ORAL 2 TIMES DAILY
Qty: 14 CAPSULE | Refills: 0 | Status: SHIPPED | OUTPATIENT
Start: 2019-03-01 | End: 2019-03-08

## 2019-03-03 PROBLEM — E11.29 MICROALBUMINURIA DUE TO TYPE 2 DIABETES MELLITUS (HCC): Status: ACTIVE | Noted: 2019-03-03

## 2019-03-03 PROBLEM — R80.9 MICROALBUMINURIA DUE TO TYPE 2 DIABETES MELLITUS (HCC): Status: ACTIVE | Noted: 2019-03-03

## 2019-04-15 ENCOUNTER — OFFICE VISIT (OUTPATIENT)
Dept: INTERNAL MEDICINE CLINIC | Age: 69
End: 2019-04-15
Payer: MEDICARE

## 2019-04-15 VITALS — BODY MASS INDEX: 48.37 KG/M2 | WEIGHT: 273 LBS

## 2019-04-15 DIAGNOSIS — I10 ESSENTIAL HYPERTENSION: ICD-10-CM

## 2019-04-15 DIAGNOSIS — E11.8 TYPE 2 DIABETES MELLITUS WITH COMPLICATION, WITHOUT LONG-TERM CURRENT USE OF INSULIN (HCC): ICD-10-CM

## 2019-04-15 DIAGNOSIS — E78.49 OTHER HYPERLIPIDEMIA: ICD-10-CM

## 2019-04-15 PROCEDURE — 97802 MEDICAL NUTRITION INDIV IN: CPT | Performed by: DIETITIAN, REGISTERED

## 2019-04-15 NOTE — PROGRESS NOTES
[address] [place] [address]. [address] [place] [address] Cesar., [facility], [facility]  [phone] (phone)  [phone] (fax)    Patient Name: Yash Antoine. Date of Birth: [birth year]. MRN: [id number]      Assessment: Patient is a 76 y.o. female seen for Initial MNT visit for Type 2 diabetes. -Nutritionally relevant labs:   Lab Results   Component Value Date/Time    LABA1C 7.0 02/26/2019    LABA1C 7.3 01/14/2019 11:28 AM    LABA1C 7.1 06/24/2016    GLUCOSE 134 02/26/2019    GLUCOSE 147 (H) 09/18/2018 04:07 PM    GLUCOSE 124 (H) 11/07/2017 02:59 PM    GLUCOSE 121 (H) 06/22/2017 12:15 PM    CHOL 212 02/26/2019    HDL 70 02/26/2019    LDLCALC 124 02/26/2019    TRIG 92 02/26/2019     -Blood sugar trends: Pt has been a diabetic for 6-8 years. This is her first time seeing a dietitian. Blood sugar levels: lowest: 120, highest: 220, average at the moment: 145. She checks her blood sugar 2-3 times a day: checks in the morning, after breakfast, and around dinner time. She is currently on metformin- 1,000 mg twice a day. She explained that her blood sugar levels increase throughout the night. She explained that her blood sugar is 130 when she goes to sleep, but it's around 150 when she wakes up. Pt. Stated that this has been consistently happening to her, about 4/7 days a week. -Food recall: Pt usually eats 2-3 meals per day. She eats out about twice a week: western sizzling, burnout back, uses [employer] card. She lives alone and sometimes cooks for herself, but sometimes her daughter makes food for pt. She likes vegetables and eats them about 4 times a week, but desires to include more in her diet.    Breakfast: turkey sausage, eggs, whole wheat toast, strawberries, oranges, cup of decaf coffee or 1 cup of water  Lunch: turkey sandwich on whole wheat, light rollins and mustard, sometimes lettuce and tomato, sharp cheddar  Dinner: Eats before 7- fried chicken, fruit, fried fish, baked potato, salad, variety of food  Snacks: fruit, turkey slices       -Main Beverages: once a week diet pop, ocean spray diet cranberry cocktail (16 oz per day), 4 cups of water on average daily    -Impression of Dietary Intake: on average, 2-3 meals per day. -  Current Outpatient Medications on File Prior to Visit   Medication Sig Dispense Refill    metoprolol (LOPRESSOR) 100 MG tablet TAKE 1 TABLET TWICE A  tablet 3    atorvastatin (LIPITOR) 40 MG tablet TAKE 1 TABLET DAILY 90 tablet 1    hydrALAZINE (APRESOLINE) 50 MG tablet TAKE 1 TABLET TWICE A  tablet 3    NIFEdipine (ADALAT CC) 90 MG extended release tablet TAKE 1 TABLET TWICE A  tablet 3    losartan (COZAAR) 50 MG tablet TAKE 1 TABLET TWICE A  tablet 3    metFORMIN (GLUCOPHAGE) 1000 MG tablet Take 1 tablet by mouth 2 times daily (with meals) 180 tablet 3    hydrochlorothiazide (HYDRODIURIL) 25 MG tablet Take 1 tablet by mouth daily 90 tablet 3    aspirin 81 MG tablet Take 81 mg by mouth daily       No current facility-administered medications on file prior to visit. Vitals from current and previous visits: There were no vitals taken for this visit. - Greater than 40 - Morbid Obesity / Extreme Obesity / Grade III. -Weight goal: lose weight. Nutrition Diagnosis:   Overweight/Obesity related to Lack of previous MNT/currently undergoing MNT as evidenced by Conditions associated with a diagnosis or treatment of type 2 diabetes. Intervention:  -Impression: She was concerned about her high glucose levels in the morning. Dietitian recommended pt. To talk to her health care provider about adjusting/adding medications as needed. Pt had some prior knowledge of carbohydrate counting. She seemed interested in learning more about meal planning. Dietetic intern explained the amount of carbohydrates in each food group. Dietetic intern and dietitian gave pt.  Food log and explained importance of keeping track of macronutrients (especially carbohydrates), blood sugar levels, physical activity, and intake of healthy diet overall.     -Instructed the patient on: Carbohydrate Counting, Consistent Carbohydrate Intake, Exchange System for Carbohydrate Counting & Meal Planning, Food Label Reading, Meal Planning for Regular, Balanced Meals & Snacks and The Importance of Regular Physical Activity.  -Handouts given for: carbohydrate counting, food logging, healthy snacks, plate method, sample meal plans/menus and weight management tips. -There are no Patient Instructions on file for this visit. -General Diet Recommendations: consistent carbohydrate intake  -Nutrition prescription: 3423-4732 calories/day, 203g carbs/day. Comprehension verified using teachback method. Monitoring/Evaluation:   -Followup visit: 4 weeks with dietitian.   -Receptiveness to education/goals: Agreeable.  -Evaluation of education: Indicates understanding.  -Readiness to change: action - ready to set action plan and implement carbohydrate counting and healthy meal plans. -Expected compliance: good. Thank you for your referral of this patient. Total time involved in direct patient education: 60 minutes for initial MNT visit. Note was created by Carmita Santillan, dietetic intern.   Note reviewed by Alhaji Hopkins RD, LD,

## 2019-04-15 NOTE — PATIENT INSTRUCTIONS
Goals: 1. Use food journal and bring journal back to next dietitian visit  2.  Eat 30-45g of carbohydrates per day

## 2019-07-22 ENCOUNTER — OFFICE VISIT (OUTPATIENT)
Dept: FAMILY MEDICINE CLINIC | Age: 69
End: 2019-07-22
Payer: MEDICARE

## 2019-07-22 VITALS
HEIGHT: 63 IN | BODY MASS INDEX: 48.37 KG/M2 | SYSTOLIC BLOOD PRESSURE: 110 MMHG | DIASTOLIC BLOOD PRESSURE: 73 MMHG | WEIGHT: 273 LBS | HEART RATE: 76 BPM | RESPIRATION RATE: 12 BRPM

## 2019-07-22 DIAGNOSIS — E78.00 HYPERCHOLESTEROLEMIA: ICD-10-CM

## 2019-07-22 DIAGNOSIS — I10 ESSENTIAL HYPERTENSION: ICD-10-CM

## 2019-07-22 LAB — HBA1C MFR BLD: 7.2 %

## 2019-07-22 PROCEDURE — 83036 HEMOGLOBIN GLYCOSYLATED A1C: CPT | Performed by: FAMILY MEDICINE

## 2019-07-22 PROCEDURE — 99214 OFFICE O/P EST MOD 30 MIN: CPT | Performed by: FAMILY MEDICINE

## 2019-07-22 PROCEDURE — 96372 THER/PROPH/DIAG INJ SC/IM: CPT | Performed by: FAMILY MEDICINE

## 2019-07-22 RX ORDER — CYANOCOBALAMIN 1000 UG/ML
INJECTION INTRAMUSCULAR; SUBCUTANEOUS
Qty: 10 ML | Refills: 1 | Status: SHIPPED | OUTPATIENT
Start: 2019-07-22 | End: 2021-06-17

## 2019-07-22 RX ORDER — CYANOCOBALAMIN 1000 UG/ML
1000 INJECTION INTRAMUSCULAR; SUBCUTANEOUS ONCE
Status: COMPLETED | OUTPATIENT
Start: 2019-07-22 | End: 2019-07-22

## 2019-07-22 RX ADMIN — CYANOCOBALAMIN 1000 MCG: 1000 INJECTION INTRAMUSCULAR; SUBCUTANEOUS at 15:22

## 2019-07-23 ENCOUNTER — TELEPHONE (OUTPATIENT)
Dept: FAMILY MEDICINE CLINIC | Age: 69
End: 2019-07-23

## 2019-07-23 RX ORDER — IBUPROFEN 200 MG
1 TABLET ORAL DAILY
Qty: 30 EACH | Refills: 0 | Status: SHIPPED | OUTPATIENT
Start: 2019-07-23

## 2019-07-30 ENCOUNTER — TELEPHONE (OUTPATIENT)
Dept: FAMILY MEDICINE CLINIC | Age: 69
End: 2019-07-30

## 2019-08-06 RX ORDER — ATORVASTATIN CALCIUM 40 MG/1
TABLET, FILM COATED ORAL
Qty: 90 TABLET | Refills: 3 | Status: SHIPPED | OUTPATIENT
Start: 2019-08-06 | End: 2020-02-24 | Stop reason: SDUPTHER

## 2019-09-05 LAB
BUN BLDV-MCNC: 16 MG/DL
CALCIUM SERPL-MCNC: 10 MG/DL
CHLORIDE BLD-SCNC: 104 MMOL/L
CO2: 26 MMOL/L
CREAT SERPL-MCNC: 0.93 MG/DL
CREATININE, URINE: 83.9
GFR CALCULATED: >60
GLUCOSE BLD-MCNC: 102 MG/DL
MICROALBUMIN/CREAT 24H UR: 3.7 MG/G{CREAT}
MICROALBUMIN/CREAT UR-RTO: 44.1
POTASSIUM SERPL-SCNC: 4.6 MMOL/L
SODIUM BLD-SCNC: 141 MMOL/L

## 2019-09-06 ENCOUNTER — OFFICE VISIT (OUTPATIENT)
Dept: NEPHROLOGY | Age: 69
End: 2019-09-06
Payer: MEDICARE

## 2019-09-06 VITALS
OXYGEN SATURATION: 97 % | HEART RATE: 82 BPM | DIASTOLIC BLOOD PRESSURE: 87 MMHG | SYSTOLIC BLOOD PRESSURE: 157 MMHG | WEIGHT: 269.2 LBS | BODY MASS INDEX: 47.69 KG/M2

## 2019-09-06 DIAGNOSIS — I10 ESSENTIAL HYPERTENSION: Primary | ICD-10-CM

## 2019-09-06 DIAGNOSIS — E78.5 DYSLIPIDEMIA: ICD-10-CM

## 2019-09-06 DIAGNOSIS — R80.9 ALBUMINURIA: ICD-10-CM

## 2019-09-06 PROCEDURE — 99213 OFFICE O/P EST LOW 20 MIN: CPT | Performed by: INTERNAL MEDICINE

## 2019-09-06 NOTE — PROGRESS NOTES
[facility]    [facility], [address] [place] [address],[address]   [place] [address] [address]  [phone]  Progress Note  9/6/2019 11:29 AM      Pt Name:    Tucker Ryder  YOB: 1950  Primary Care Physician:  Roxanne Frsaer MD     Chief Complaint:   Chief Complaint   Patient presents with    Hypertension    Other     Microalbuminuria        Background Information/Interval History:   77 yo AAF with hx obesity, HTN for over 30+ year. She has had DM for few years and takes metformin. Her Mother was on dialysis who passed away in early 36s. She reports hx TIA several years ago. She had Right knee  Replacement Nov 2017. She took some NSAIDs in the past. She still takes it occasionally for chronic back pain. She is here for follow-up. She did not bring a log of sugars or blood pressure. She says Samaniego Monty are usually pretty good. \" No chest pain or shortness of breath. She takes hydralazine, cozaar, nifedipine, lopressor and HCTZ. She reports she just had labs drawn for this visit- results are still pending. Past History:  Past Medical History:   Diagnosis Date    Hyperlipidemia 2009    Hypertension 1973    TIA (transient ischemic attack) 2003    Type II or unspecified type diabetes mellitus without mention of complication, not stated as uncontrolled 2014     Past Surgical History:   Procedure Laterality Date    TOTAL KNEE ARTHROPLASTY Bilateral 11/13/2017    [facility] DR [facility]         VITALS:  BP (!) 157/87 (Site: Left Lower Arm, Position: Sitting, Cuff Size: Medium Adult)   Pulse 82   Wt 269 lb 3.2 oz (122.1 kg)   SpO2 97%   BMI 47.69 kg/m²   Wt Readings from Last 3 Encounters:   09/06/19 269 lb 3.2 oz (122.1 kg)   07/22/19 273 lb (123.8 kg)   04/15/19 273 lb (123.8 kg)     Body mass index is 47.69 kg/m².      General Appearance: alert and cooperative with exam, appears comfortable, no distress  Oral: moist oral mucus membranes  Neck: No jugular venous distention  Lungs:

## 2019-10-29 DIAGNOSIS — E11.9 TYPE 2 DIABETES MELLITUS WITHOUT COMPLICATION, WITHOUT LONG-TERM CURRENT USE OF INSULIN (HCC): ICD-10-CM

## 2019-10-29 RX ORDER — HYDROCHLOROTHIAZIDE 25 MG/1
TABLET ORAL
Qty: 90 TABLET | Refills: 3 | Status: SHIPPED | OUTPATIENT
Start: 2019-10-29 | End: 2020-10-23

## 2019-11-06 ENCOUNTER — OFFICE VISIT (OUTPATIENT)
Dept: FAMILY MEDICINE CLINIC | Age: 69
End: 2019-11-06
Payer: MEDICARE

## 2019-11-06 VITALS
SYSTOLIC BLOOD PRESSURE: 138 MMHG | RESPIRATION RATE: 16 BRPM | WEIGHT: 264.4 LBS | HEIGHT: 63 IN | TEMPERATURE: 97.5 F | HEART RATE: 79 BPM | BODY MASS INDEX: 46.85 KG/M2 | DIASTOLIC BLOOD PRESSURE: 81 MMHG

## 2019-11-06 DIAGNOSIS — I10 ESSENTIAL HYPERTENSION: ICD-10-CM

## 2019-11-06 DIAGNOSIS — E78.00 HYPERCHOLESTEROLEMIA: ICD-10-CM

## 2019-11-06 DIAGNOSIS — E53.8 VITAMIN B12 DEFICIENCY: ICD-10-CM

## 2019-11-06 DIAGNOSIS — Z11.59 ENCOUNTER FOR HEPATITIS C SCREENING TEST FOR LOW RISK PATIENT: ICD-10-CM

## 2019-11-06 DIAGNOSIS — E11.9 TYPE II DIABETES MELLITUS, WELL CONTROLLED (HCC): Primary | ICD-10-CM

## 2019-11-06 DIAGNOSIS — R80.9 MICROALBUMINURIA: ICD-10-CM

## 2019-11-06 LAB — HBA1C MFR BLD: 6.6 %

## 2019-11-06 PROCEDURE — 83036 HEMOGLOBIN GLYCOSYLATED A1C: CPT | Performed by: FAMILY MEDICINE

## 2019-11-06 PROCEDURE — 96372 THER/PROPH/DIAG INJ SC/IM: CPT | Performed by: FAMILY MEDICINE

## 2019-11-06 PROCEDURE — 99214 OFFICE O/P EST MOD 30 MIN: CPT | Performed by: FAMILY MEDICINE

## 2019-11-06 RX ORDER — CYANOCOBALAMIN 1000 UG/ML
INJECTION INTRAMUSCULAR; SUBCUTANEOUS
Qty: 10 ML | Refills: 1 | Status: CANCELLED | OUTPATIENT
Start: 2019-11-06

## 2019-11-06 RX ORDER — CYANOCOBALAMIN 1000 UG/ML
1000 INJECTION INTRAMUSCULAR; SUBCUTANEOUS ONCE
Status: COMPLETED | OUTPATIENT
Start: 2019-11-06 | End: 2019-11-06

## 2019-11-06 RX ADMIN — CYANOCOBALAMIN 1000 MCG: 1000 INJECTION INTRAMUSCULAR; SUBCUTANEOUS at 16:17

## 2019-11-17 DIAGNOSIS — N18.2 CKD (CHRONIC KIDNEY DISEASE), STAGE II: ICD-10-CM

## 2019-11-18 RX ORDER — LOSARTAN POTASSIUM 50 MG/1
TABLET ORAL
Qty: 180 TABLET | Refills: 3 | Status: SHIPPED | OUTPATIENT
Start: 2019-11-18 | End: 2020-12-01

## 2019-11-18 RX ORDER — NIFEDIPINE 90 MG/1
TABLET, FILM COATED, EXTENDED RELEASE ORAL
Qty: 180 TABLET | Refills: 3 | Status: SHIPPED | OUTPATIENT
Start: 2019-11-18 | End: 2020-12-10

## 2019-11-18 RX ORDER — HYDRALAZINE HYDROCHLORIDE 50 MG/1
TABLET, FILM COATED ORAL
Qty: 180 TABLET | Refills: 3 | Status: SHIPPED | OUTPATIENT
Start: 2019-11-18 | End: 2020-12-01

## 2020-02-17 RX ORDER — METOPROLOL TARTRATE 100 MG/1
TABLET ORAL
Qty: 180 TABLET | Refills: 3 | Status: SHIPPED | OUTPATIENT
Start: 2020-02-17 | End: 2021-02-18

## 2020-02-18 ENCOUNTER — NURSE ONLY (OUTPATIENT)
Dept: LAB | Age: 70
End: 2020-02-18

## 2020-02-18 LAB
ALBUMIN SERPL-MCNC: 4.2 G/DL (ref 3.5–5.1)
ALP BLD-CCNC: 88 U/L (ref 38–126)
ALT SERPL-CCNC: 13 U/L (ref 11–66)
AST SERPL-CCNC: 13 U/L (ref 5–40)
BILIRUB SERPL-MCNC: < 0.2 MG/DL (ref 0.3–1.2)
BILIRUBIN DIRECT: < 0.2 MG/DL (ref 0–0.3)
TOTAL PROTEIN: 7.6 G/DL (ref 6.1–8)

## 2020-02-21 ENCOUNTER — OFFICE VISIT (OUTPATIENT)
Dept: FAMILY MEDICINE CLINIC | Age: 70
End: 2020-02-21
Payer: MEDICARE

## 2020-02-21 ENCOUNTER — NURSE ONLY (OUTPATIENT)
Dept: LAB | Age: 70
End: 2020-02-21

## 2020-02-21 VITALS
SYSTOLIC BLOOD PRESSURE: 136 MMHG | HEART RATE: 70 BPM | DIASTOLIC BLOOD PRESSURE: 73 MMHG | BODY MASS INDEX: 45.89 KG/M2 | WEIGHT: 259 LBS | TEMPERATURE: 97.8 F | RESPIRATION RATE: 12 BRPM | HEIGHT: 63 IN

## 2020-02-21 LAB
ALBUMIN SERPL-MCNC: 4.2 G/DL (ref 3.5–5.1)
ALP BLD-CCNC: 91 U/L (ref 38–126)
ALT SERPL-CCNC: 13 U/L (ref 11–66)
ANION GAP SERPL CALCULATED.3IONS-SCNC: 14 MEQ/L (ref 8–16)
AST SERPL-CCNC: 16 U/L (ref 5–40)
BACTERIA: ABNORMAL
BASOPHILS # BLD: 0.6 %
BASOPHILS ABSOLUTE: 0 THOU/MM3 (ref 0–0.1)
BILIRUB SERPL-MCNC: 0.3 MG/DL (ref 0.3–1.2)
BILIRUBIN URINE: NEGATIVE
BLOOD, URINE: NEGATIVE
BUN BLDV-MCNC: 13 MG/DL (ref 7–22)
CALCIUM SERPL-MCNC: 10.2 MG/DL (ref 8.5–10.5)
CASTS: ABNORMAL /LPF
CASTS: ABNORMAL /LPF
CHARACTER, URINE: CLEAR
CHLORIDE BLD-SCNC: 104 MEQ/L (ref 98–111)
CHOLESTEROL, TOTAL: 228 MG/DL (ref 100–199)
CO2: 24 MEQ/L (ref 23–33)
COLOR: YELLOW
CREAT SERPL-MCNC: 0.7 MG/DL (ref 0.4–1.2)
CRYSTALS: ABNORMAL
EOSINOPHIL # BLD: 0.6 %
EOSINOPHILS ABSOLUTE: 0 THOU/MM3 (ref 0–0.4)
EPITHELIAL CELLS, UA: ABNORMAL /HPF
ERYTHROCYTE [DISTWIDTH] IN BLOOD BY AUTOMATED COUNT: 15.7 % (ref 11.5–14.5)
ERYTHROCYTE [DISTWIDTH] IN BLOOD BY AUTOMATED COUNT: 55.8 FL (ref 35–45)
GFR SERPL CREATININE-BSD FRML MDRD: 83 ML/MIN/1.73M2
GLUCOSE BLD-MCNC: 110 MG/DL (ref 70–108)
GLUCOSE, URINE: >= 1000 MG/DL
HBA1C MFR BLD: 6.8 %
HCT VFR BLD CALC: 47.2 % (ref 37–47)
HDLC SERPL-MCNC: 73 MG/DL
HEMOGLOBIN: 14.6 GM/DL (ref 12–16)
IMMATURE GRANS (ABS): 0.02 THOU/MM3 (ref 0–0.07)
IMMATURE GRANULOCYTES: 0.3 %
KETONES, URINE: NEGATIVE
LDL CHOLESTEROL CALCULATED: 137 MG/DL
LEUKOCYTE ESTERASE, URINE: ABNORMAL
LYMPHOCYTES # BLD: 24.2 %
LYMPHOCYTES ABSOLUTE: 1.5 THOU/MM3 (ref 1–4.8)
MCH RBC QN AUTO: 29.8 PG (ref 26–33)
MCHC RBC AUTO-ENTMCNC: 30.9 GM/DL (ref 32.2–35.5)
MCV RBC AUTO: 96.3 FL (ref 81–99)
MISCELLANEOUS LAB TEST RESULT: ABNORMAL
MONOCYTES # BLD: 7.8 %
MONOCYTES ABSOLUTE: 0.5 THOU/MM3 (ref 0.4–1.3)
NITRITE, URINE: POSITIVE
NUCLEATED RED BLOOD CELLS: 0 /100 WBC
PH UA: 5 (ref 5–9)
PLATELET # BLD: 338 THOU/MM3 (ref 130–400)
PMV BLD AUTO: 11 FL (ref 9.4–12.4)
POTASSIUM SERPL-SCNC: 4.5 MEQ/L (ref 3.5–5.2)
PROTEIN UA: NEGATIVE MG/DL
RBC # BLD: 4.9 MILL/MM3 (ref 4.2–5.4)
RBC URINE: ABNORMAL /HPF
RENAL EPITHELIAL, UA: ABNORMAL
SEG NEUTROPHILS: 66.5 %
SEGMENTED NEUTROPHILS ABSOLUTE COUNT: 4.2 THOU/MM3 (ref 1.8–7.7)
SODIUM BLD-SCNC: 142 MEQ/L (ref 135–145)
SPECIFIC GRAVITY UA: 1.03 (ref 1–1.03)
TOTAL PROTEIN: 8.2 G/DL (ref 6.1–8)
TRIGL SERPL-MCNC: 89 MG/DL (ref 0–199)
TSH SERPL DL<=0.05 MIU/L-ACNC: 2.34 UIU/ML (ref 0.4–4.2)
UROBILINOGEN, URINE: 0.2 EU/DL (ref 0–1)
WBC # BLD: 6.3 THOU/MM3 (ref 4.8–10.8)
WBC UA: ABNORMAL /HPF
YEAST: ABNORMAL

## 2020-02-21 PROCEDURE — 3288F FALL RISK ASSESSMENT DOCD: CPT | Performed by: FAMILY MEDICINE

## 2020-02-21 PROCEDURE — 99214 OFFICE O/P EST MOD 30 MIN: CPT | Performed by: FAMILY MEDICINE

## 2020-02-21 PROCEDURE — G8510 SCR DEP NEG, NO PLAN REQD: HCPCS | Performed by: FAMILY MEDICINE

## 2020-02-21 PROCEDURE — 83036 HEMOGLOBIN GLYCOSYLATED A1C: CPT | Performed by: FAMILY MEDICINE

## 2020-02-21 RX ORDER — LANCETS
1 EACH MISCELLANEOUS 4 TIMES DAILY
Qty: 204 EACH | Refills: 1 | Status: SHIPPED | OUTPATIENT
Start: 2020-02-21 | End: 2022-11-03

## 2020-02-21 SDOH — ECONOMIC STABILITY: TRANSPORTATION INSECURITY
IN THE PAST 12 MONTHS, HAS THE LACK OF TRANSPORTATION KEPT YOU FROM MEDICAL APPOINTMENTS OR FROM GETTING MEDICATIONS?: PATIENT DECLINED

## 2020-02-21 SDOH — ECONOMIC STABILITY: INCOME INSECURITY: HOW HARD IS IT FOR YOU TO PAY FOR THE VERY BASICS LIKE FOOD, HOUSING, MEDICAL CARE, AND HEATING?: PATIENT DECLINED

## 2020-02-21 SDOH — ECONOMIC STABILITY: TRANSPORTATION INSECURITY
IN THE PAST 12 MONTHS, HAS LACK OF TRANSPORTATION KEPT YOU FROM MEETINGS, WORK, OR FROM GETTING THINGS NEEDED FOR DAILY LIVING?: PATIENT DECLINED

## 2020-02-21 SDOH — ECONOMIC STABILITY: FOOD INSECURITY: WITHIN THE PAST 12 MONTHS, THE FOOD YOU BOUGHT JUST DIDN'T LAST AND YOU DIDN'T HAVE MONEY TO GET MORE.: PATIENT DECLINED

## 2020-02-21 SDOH — ECONOMIC STABILITY: FOOD INSECURITY: WITHIN THE PAST 12 MONTHS, YOU WORRIED THAT YOUR FOOD WOULD RUN OUT BEFORE YOU GOT MONEY TO BUY MORE.: PATIENT DECLINED

## 2020-02-21 ASSESSMENT — PATIENT HEALTH QUESTIONNAIRE - PHQ9
SUM OF ALL RESPONSES TO PHQ9 QUESTIONS 1 & 2: 0
1. LITTLE INTEREST OR PLEASURE IN DOING THINGS: 0
2. FEELING DOWN, DEPRESSED OR HOPELESS: 0
SUM OF ALL RESPONSES TO PHQ QUESTIONS 1-9: 0
SUM OF ALL RESPONSES TO PHQ QUESTIONS 1-9: 0

## 2020-02-21 NOTE — PROGRESS NOTES
[facility] [address], [address]  Ph:   [phone]  Fax: [phone]    Provider:  Dr. Anai Jimenes    Patient:  Maryann White  YOB: 1950      Visit Date:  2/21/2020    Chief Complaint   Patient presents with    3 Month Follow-Up    Hypertension    Hyperlipidemia    Diabetes     A1C today in office.  Lesion(s)     SKIN LESION ON BACK ON LEFT LEG WOULD LIKE PROVIDER TO LOOK AT IT.        HPI:    Maryann White is a 71 y.o. female who comes today to the office for follow-up of Diabetes mellitus type II, hypertension, hypercholesterolemia and microalbuminuria. She has lost 5 pounds since last visit in November. She also has CKD II and sees Dr. Carmel Strange.  Last visit was September 6, 2019 and he continue the losartan, nifedipine, hydralazine, Lopressor and hydrochlorothiazide. Diabetes Mellitus: she is taking Metformin ER 1000 mg 1 tablet PO BID and Jardiance 10 mg 1 PO daily. She denies side effects from the medicine. She denies  hyperglycemia, hypoglycemia , nausea, paresthesia of the feet, polydipsia, polyuria, visual disturbances and weight loss. HgbA1c today was 6.8% from 6.6% last visit. She is up to date on Pneumovax and Prevnar. She does not like the influenza vaccine, she states she had a bad experience in the past.  She is up to date with his Ophthalmologist visits. Hypertension:  she is taking Losartan 50 mg 1 PO BID, HCTZ 25 mg 1 PO daily as needed, Hydralazine 50 mg 1 tablet PO BID, Nifedipine ER 90 mg 1 PO BID, and Metoprolol 100 mg 1 PO BID. She denies any side effects from the medications. She denies chest pain, dyspnea, exertional chest pressure/discomfort, fatigue, irregular heart beat, lower extremity edema, near-syncope, orthopnea, palpitations and syncope.   Cardiovascular risk factors: advanced age (older than 54 for men, 72 for women), diabetes mellitus, dyslipidemia, hypertension, microalbuminuria, obesity (BMI >= 30 Lipid Panel    TSH without Reflex    Urinalysis with Microscopic   4. Vitamin B 12 deficiency  Methylmalonic Acid, Serum    Folate    Homocysteine, Serum   5. Inflamed seborrheic keratosis     6. CKD stage G2/A2, GFR 60-89 and albumin creatinine ratio  mg/g         Plan:  Continue Metformin ER 1000 mg 1 tablet PO BID. Continue Jardiance 10 mg 1 tablet PO daily  Continue Losartan 50 mg 1 tablet PO BID  Continue HCTZ 25 mg 1 tablet PO daily as needed  Continue Hydralazine 50 mg 1 tablet PO BID  Continue Nifedipine ER 90 mg 1 tablet PO BID  Continue Metoprolol 100 mg 1 tablet PO BID. Continue baby ASA daily  Declines Influenza vaccine today again      Orders Placed This Encounter   Procedures    CBC Auto Differential     Standing Status:   Future     Number of Occurrences:   1     Standing Expiration Date:   2/20/2021    Comprehensive Metabolic Panel     Standing Status:   Future     Number of Occurrences:   1     Standing Expiration Date:   2/20/2021    Lipid Panel     Standing Status:   Future     Number of Occurrences:   1     Standing Expiration Date:   2/20/2021     Order Specific Question:   Is Patient Fasting?/# of Hours     Answer:   12 hour    TSH without Reflex     Standing Status:   Future     Number of Occurrences:   1     Standing Expiration Date:   2/20/2021    Urinalysis with Microscopic     Standing Status:   Future     Number of Occurrences:   1     Standing Expiration Date:   2/21/2021     Order Specific Question:   SPECIFY(EX-CATH,MIDSTREAM,CYSTO,ETC)? Answer:   midstream    Methylmalonic Acid, Serum     Standing Status:   Future     Standing Expiration Date:   2/21/2021    Folate     Standing Status:   Future     Standing Expiration Date:   2/20/2021    Homocysteine, Serum     Standing Status:   Future     Standing Expiration Date:   2/21/2021    POCT glycosylated hemoglobin (Hb A1C)       Return in about 4 months (around 6/21/2020).     Mccoy Shone, MD

## 2020-02-24 ENCOUNTER — TELEPHONE (OUTPATIENT)
Dept: FAMILY MEDICINE CLINIC | Age: 70
End: 2020-02-24

## 2020-02-24 RX ORDER — ATORVASTATIN CALCIUM 80 MG/1
80 TABLET, FILM COATED ORAL DAILY
Qty: 90 TABLET | Refills: 1 | Status: SHIPPED | OUTPATIENT
Start: 2020-02-24 | End: 2020-08-19 | Stop reason: SDUPTHER

## 2020-02-24 RX ORDER — NITROFURANTOIN 25; 75 MG/1; MG/1
100 CAPSULE ORAL 2 TIMES DAILY
Qty: 14 CAPSULE | Refills: 0 | Status: SHIPPED | OUTPATIENT
Start: 2020-02-24 | End: 2020-03-02

## 2020-02-24 NOTE — TELEPHONE ENCOUNTER
Informed patient of results. Patient verbalized understanding. mailing copy of lab results as requested along with blood work orders to be completed.

## 2020-03-05 ENCOUNTER — PROCEDURE VISIT (OUTPATIENT)
Dept: FAMILY MEDICINE CLINIC | Age: 70
End: 2020-03-05
Payer: MEDICARE

## 2020-03-05 VITALS
TEMPERATURE: 98 F | HEIGHT: 63 IN | HEART RATE: 72 BPM | BODY MASS INDEX: 45.89 KG/M2 | RESPIRATION RATE: 14 BRPM | SYSTOLIC BLOOD PRESSURE: 148 MMHG | DIASTOLIC BLOOD PRESSURE: 74 MMHG | WEIGHT: 259 LBS

## 2020-03-05 PROCEDURE — 11301 SHAVE SKIN LESION 0.6-1.0 CM: CPT | Performed by: FAMILY MEDICINE

## 2020-03-05 NOTE — PROGRESS NOTES
ASSESSMENT:     Diagnosis Orders   1. Seborrheic keratosis, inflamed         Plan:    Apply antibiotic cream three times a day. Keep wound clean. It is okay to take a shower after 12 hours. Call office if there is any signs of infection including redness, tenderness or drainage. It takes about a week to get the results of the biopsy if performed.           Tobi Cogan, MD

## 2020-03-09 ENCOUNTER — NURSE ONLY (OUTPATIENT)
Dept: LAB | Age: 70
End: 2020-03-09

## 2020-03-10 ENCOUNTER — TELEPHONE (OUTPATIENT)
Dept: FAMILY MEDICINE CLINIC | Age: 70
End: 2020-03-10

## 2020-03-10 NOTE — TELEPHONE ENCOUNTER
----- Message from Alvaro Carpenter MD sent at 3/9/2020  5:11 PM EDT -----  Biopsy show to be seen right keratosis, benign condition

## 2020-03-14 LAB
IMMUNOFIXATION RESULT, SERUM: NORMAL
PROTEIN ELECTROPHORESIS, SERUM: NORMAL

## 2020-03-16 ENCOUNTER — TELEPHONE (OUTPATIENT)
Dept: FAMILY MEDICINE CLINIC | Age: 70
End: 2020-03-16

## 2020-03-16 NOTE — TELEPHONE ENCOUNTER
----- Message from TIA Can CNP sent at 3/16/2020  8:24 AM EDT -----  Labs are within acceptable ranges.

## 2020-03-30 ENCOUNTER — TELEPHONE (OUTPATIENT)
Dept: FAMILY MEDICINE CLINIC | Age: 70
End: 2020-03-30

## 2020-03-30 NOTE — TELEPHONE ENCOUNTER
Patient is calling in regarding her jardiance medication that is due to be renewed. She said the tier levels have changed and her price has gone up. She said she spoke with someone in the office a couple of weeks ago about doing a tier exception, but she hasn't heard back about it. Please call her to update.

## 2020-03-30 NOTE — TELEPHONE ENCOUNTER
Please let her know that I change the prescription from [address] [place] [address] to [place]. This is a preferred drug in her insurance. I sent a 90-day supplies to [facility] and 30-day supply to the local [facility]. We need to repeat hemoglobin A1c in 6 to 8 weeks.   Please make an appointment

## 2020-03-31 NOTE — TELEPHONE ENCOUNTER
Called spoke to the pt she will  medication from the pharmacy and also wants the order for the A1C sent to her home. Pt will call back in 6-8 weeks to schedule the apt once she has the A1C done. Please order the [facility] and I will mail it to the pt.

## 2020-08-17 NOTE — TELEPHONE ENCOUNTER
Please approve or deny     Last Visit Date:  3-       Next Visit Date:    No follow up appointment at this time. Tried to call patient to schedule follow up -- calling restrictions on phone # listed unable to process. No mychart. Mailing letter to instruct patient to call to schedule.

## 2020-08-17 NOTE — LETTER
[address]  [address]  [place] [address]  Phone: [phone]  Fax: [phone]    Katarina Bradford MD        August 17, 2020    [place] [address]      Dear Melissa Coburn:    Our records indicate you have not been since 3-5-2020. We have received refill requests and need you to contact our office to schedule a follow up at [phone]. If you have any questions or concerns, please don't hesitate to call.     Thank you  Your Care Team

## 2020-08-19 RX ORDER — ATORVASTATIN CALCIUM 80 MG/1
80 TABLET, FILM COATED ORAL DAILY
Qty: 90 TABLET | Refills: 0 | Status: SHIPPED | OUTPATIENT
Start: 2020-08-19 | End: 2020-11-04

## 2020-09-08 ENCOUNTER — NURSE ONLY (OUTPATIENT)
Dept: LAB | Age: 70
End: 2020-09-08

## 2020-09-08 LAB
ANION GAP SERPL CALCULATED.3IONS-SCNC: 10 MEQ/L (ref 8–16)
BUN BLDV-MCNC: 17 MG/DL (ref 7–22)
CALCIUM SERPL-MCNC: 10 MG/DL (ref 8.5–10.5)
CHLORIDE BLD-SCNC: 104 MEQ/L (ref 98–111)
CO2: 23 MEQ/L (ref 23–33)
CREAT SERPL-MCNC: 0.7 MG/DL (ref 0.4–1.2)
CREATININE URINE: 62.5 MG/DL
GFR SERPL CREATININE-BSD FRML MDRD: 83 ML/MIN/1.73M2
GLUCOSE BLD-MCNC: 115 MG/DL (ref 70–108)
POTASSIUM SERPL-SCNC: 4.2 MEQ/L (ref 3.5–5.2)
PROT/CREAT RATIO, UR: 0.23
PROTEIN, URINE: 14.1 MG/DL
SODIUM BLD-SCNC: 137 MEQ/L (ref 135–145)

## 2020-09-09 ENCOUNTER — OFFICE VISIT (OUTPATIENT)
Dept: NEPHROLOGY | Age: 70
End: 2020-09-09
Payer: MEDICARE

## 2020-09-09 VITALS
TEMPERATURE: 97.7 F | SYSTOLIC BLOOD PRESSURE: 140 MMHG | HEART RATE: 74 BPM | WEIGHT: 258.2 LBS | DIASTOLIC BLOOD PRESSURE: 81 MMHG | OXYGEN SATURATION: 96 % | BODY MASS INDEX: 45.74 KG/M2

## 2020-09-09 PROCEDURE — 99213 OFFICE O/P EST LOW 20 MIN: CPT | Performed by: INTERNAL MEDICINE

## 2020-09-09 NOTE — PROGRESS NOTES
[facility]    [facility], [address] [place] [address],[address]   [address] [facility], [address]  [phone]  Progress Note  9/9/2020 11:29 AM      Pt Name:    Jerona Cogan:    1950  Primary Care Physician:  Michelle Manzano MD     Chief Complaint:   Chief Complaint   Patient presents with    Follow-up     essential hypertension         Background Information/Interval History:   70 yo AAF with hx obesity, HTN for over 30+ year. She has had DM for few years and takes metformin. Her Mother was on dialysis who passed away in early 36s. She reports hx TIA several years ago. She had Right knee  Replacement Nov 2017. She took some NSAIDs in the past. She still takes it occasionally for chronic back pain. She is here for follow-up. Here for follow-up. She says \"sugars have room for improvement but they are not too bad. \" BP is 140/81. No CP or shortness of breath. Has some leg swelling. No hematuria or dysuria. She takes hydralazine, cozaar, nifedipine, lopressor and HCTZ. Pt reports her brother was diagnosed with amyloidosis and had heart and kidney transplant. Past History:  Past Medical History:   Diagnosis Date    Hyperlipidemia 2009    Hypertension 1973    TIA (transient ischemic attack) 2003    Type II or unspecified type diabetes mellitus without mention of complication, not stated as uncontrolled 2014     Past Surgical History:   Procedure Laterality Date    TOTAL KNEE ARTHROPLASTY Bilateral 11/13/2017    [facility] DR Zapata [facility]        VITALS:  BP (!) 140/81 (Site: Right Wrist, Position: Sitting, Cuff Size: Large Adult)   Pulse 74   Temp 97.7 °F (36.5 °C)   Wt 258 lb 3.2 oz (117.1 kg)   SpO2 96%   BMI 45.74 kg/m²   Wt Readings from Last 3 Encounters:   09/09/20 258 lb 3.2 oz (117.1 kg)   03/05/20 259 lb (117.5 kg)   02/21/20 259 lb (117.5 kg)     Body mass index is 45.74 kg/m².      General Appearance: alert and cooperative with exam, appears comfortable, no distress  Oral: moist oral mucus membranes  Neck: No jugular venous distention  Lungs: Air entry B/L, no crackles or rales, no use of accessory muscles  Heart: S1, S2 heard  GI: soft, non-tender, no guarding  Extremities: trace ankle LE edema     Medications:  Current Outpatient Medications   Medication Sig Dispense Refill    atorvastatin (LIPITOR) 80 MG tablet Take 1 tablet by mouth daily 90 tablet 0    metFORMIN (GLUCOPHAGE) 1000 MG tablet TAKE 1 TABLET TWICE A DAY WITH MEALS 180 tablet 1    dapagliflozin (FARXIGA) 5 MG tablet Take 1 tablet by mouth every morning 90 tablet 1    dapagliflozin (FARXIGA) 5 MG tablet Take 1 tablet by mouth every morning 30 tablet 5    blood glucose test strips (ACCU-CHEK ACTIVE STRIPS) strip 1 each by In Vitro route 4 times daily As needed. 200 each 3    metoprolol (LOPRESSOR) 100 MG tablet TAKE 1 TABLET TWICE A  tablet 3    losartan (COZAAR) 50 MG tablet TAKE 1 TABLET TWICE A  tablet 3    NIFEdipine (ADALAT CC) 90 MG extended release tablet TAKE 1 TABLET TWICE A  tablet 3    hydrALAZINE (APRESOLINE) 50 MG tablet TAKE 1 TABLET TWICE A  tablet 3    hydrochlorothiazide (HYDRODIURIL) 25 MG tablet TAKE 1 TABLET DAILY 90 tablet 3    INSULIN SYRINGE 1CC/29G 29G X 1/2\" 1 ML MISC 1 each by Does not apply route daily 30 each 0    Needle, Disp, (HYPODERMIC NEEDLE 60VE4-5/4\") 27G X 1-1/4\" MISC 1 Units by Does not apply route daily 30 each 5    cyanocobalamin 1000 MCG/ML injection Inject 1 cc IM daily for 1 week, then 1 injectio weekly for 1 months, then 1 injection mointhly (Patient taking differently: 1,000 mcg every 30 days ) 10 mL 1    aspirin 81 MG tablet Take 81 mg by mouth daily      Accu-Chek Multiclix Lancets MISC 1 Units by Does not apply route 4 times daily 204 each 1     No current facility-administered medications for this visit.          Laboratory & Diagnostics:  Sept 2018: Creat 0.9, K 4.6  Sept 2017: UA: trace blood, trace protein UPCR 49 mg/g     Dec 2018: R 9.9, L 10.2, no renal artery stenosis  Feb 2019: , UPCR 220 mg/g, AIC 7%  Aldosterone 9.6, renin less than 0.167    Feb 2019: K 4.5, Creat 0.73, , UPCR 220 mg/g  Aug 2019: creat 0.93, UACR 44 mg/g  February 2020. UA: (-) Blood/protein  Sept 2020: K 4.2, Creat 0.7, UPCR 230 mg/g     Impression/Plan:   1. HTN:  Likely essential.  No renal artery stenosis on prior scans. Continue with cozaar, nifedipine, hydralazine, lopressor and HCTZ. Overall stable. Stable at this time. Advised close monitoring at home. Advise low salt diet. Consider changing Cozaar to Aldactone. Discussed with patient. She would like to monitor blood pressure for now. 2. 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 Not much proteinuria. She is on cozaar. 3. Dyslipidemia: on lipitor. 4. DM: on metformin and jardiance. 5. Advised weight loss. Orders Placed This Encounter   Procedures    Protein / creatinine ratio, urine    Electrophoresis Protein, Serum without Reflex to Immunofixation    Immunofixation electrophoresis    Urinalysis with Microscopic    Comprehensive Metabolic Panel     Return in about 6 months (around 3/9/2021).     Theresa Orellana MD  [facility]

## 2020-09-09 NOTE — PROGRESS NOTES
Patient brought med list.    Patient states she is having sensation in her finger tips, what can we do?

## 2020-10-02 RX ORDER — DAPAGLIFLOZIN 5 MG/1
TABLET, FILM COATED ORAL
Qty: 90 TABLET | Refills: 3 | OUTPATIENT
Start: 2020-10-02

## 2020-10-23 RX ORDER — HYDROCHLOROTHIAZIDE 25 MG/1
TABLET ORAL
Qty: 90 TABLET | Refills: 3 | Status: SHIPPED | OUTPATIENT
Start: 2020-10-23 | End: 2021-10-18

## 2020-10-28 NOTE — TELEPHONE ENCOUNTER
Please approve or deny     Last Visit Date:  3-  Next Visit Date:    Visit date not found. No mychart. Letter was mailed 8- to patient to schedule an appointment. No response.

## 2020-12-01 RX ORDER — LOSARTAN POTASSIUM 50 MG/1
TABLET ORAL
Qty: 180 TABLET | Refills: 3 | Status: SHIPPED | OUTPATIENT
Start: 2020-12-01 | End: 2021-12-06

## 2020-12-01 RX ORDER — HYDRALAZINE HYDROCHLORIDE 50 MG/1
TABLET, FILM COATED ORAL
Qty: 180 TABLET | Refills: 3 | Status: SHIPPED | OUTPATIENT
Start: 2020-12-01 | End: 2021-12-06

## 2020-12-02 ENCOUNTER — NURSE ONLY (OUTPATIENT)
Dept: LAB | Age: 70
End: 2020-12-02

## 2020-12-02 ENCOUNTER — OFFICE VISIT (OUTPATIENT)
Dept: FAMILY MEDICINE CLINIC | Age: 70
End: 2020-12-02
Payer: MEDICARE

## 2020-12-02 VITALS
SYSTOLIC BLOOD PRESSURE: 124 MMHG | HEART RATE: 70 BPM | RESPIRATION RATE: 14 BRPM | WEIGHT: 258.2 LBS | OXYGEN SATURATION: 96 % | BODY MASS INDEX: 45.75 KG/M2 | TEMPERATURE: 97.7 F | DIASTOLIC BLOOD PRESSURE: 80 MMHG | HEIGHT: 63 IN

## 2020-12-02 LAB
ALBUMIN SERPL-MCNC: 4 G/DL (ref 3.5–5.1)
ALP BLD-CCNC: 84 U/L (ref 38–126)
ALT SERPL-CCNC: 13 U/L (ref 11–66)
AMORPHOUS: ABNORMAL
ANION GAP SERPL CALCULATED.3IONS-SCNC: 16 MEQ/L (ref 8–16)
AST SERPL-CCNC: 13 U/L (ref 5–40)
BACTERIA: ABNORMAL
BASOPHILS # BLD: 0.6 %
BASOPHILS ABSOLUTE: 0 THOU/MM3 (ref 0–0.1)
BILIRUB SERPL-MCNC: 0.3 MG/DL (ref 0.3–1.2)
BILIRUBIN URINE: NEGATIVE
BLOOD, URINE: NEGATIVE
BUN BLDV-MCNC: 19 MG/DL (ref 7–22)
CALCIUM SERPL-MCNC: 10.1 MG/DL (ref 8.5–10.5)
CASTS: ABNORMAL /LPF
CHARACTER, URINE: CLEAR
CHLORIDE BLD-SCNC: 106 MEQ/L (ref 98–111)
CHOLESTEROL, TOTAL: 239 MG/DL (ref 100–199)
CO2: 22 MEQ/L (ref 23–33)
COLOR: YELLOW
CREAT SERPL-MCNC: 0.7 MG/DL (ref 0.4–1.2)
EOSINOPHIL # BLD: 0.7 %
EOSINOPHILS ABSOLUTE: 0 THOU/MM3 (ref 0–0.4)
EPITHELIAL CELLS, UA: ABNORMAL /HPF
ERYTHROCYTE [DISTWIDTH] IN BLOOD BY AUTOMATED COUNT: 15 % (ref 11.5–14.5)
ERYTHROCYTE [DISTWIDTH] IN BLOOD BY AUTOMATED COUNT: 53.7 FL (ref 35–45)
GFR SERPL CREATININE-BSD FRML MDRD: 83 ML/MIN/1.73M2
GLUCOSE BLD-MCNC: 121 MG/DL (ref 70–108)
GLUCOSE, URINE: >= 1000 MG/DL
HBA1C MFR BLD: 6.6 %
HCT VFR BLD CALC: 45.2 % (ref 37–47)
HDLC SERPL-MCNC: 61 MG/DL
HEMOGLOBIN: 14.5 GM/DL (ref 12–16)
IMMATURE GRANS (ABS): 0.02 THOU/MM3 (ref 0–0.07)
IMMATURE GRANULOCYTES: 0.3 %
KETONES, URINE: NEGATIVE
LDL CHOLESTEROL CALCULATED: 160 MG/DL
LEUKOCYTE ESTERASE, URINE: ABNORMAL
LYMPHOCYTES # BLD: 22.2 %
LYMPHOCYTES ABSOLUTE: 1.6 THOU/MM3 (ref 1–4.8)
MCH RBC QN AUTO: 30.9 PG (ref 26–33)
MCHC RBC AUTO-ENTMCNC: 32.1 GM/DL (ref 32.2–35.5)
MCV RBC AUTO: 96.2 FL (ref 81–99)
MONOCYTES # BLD: 6.1 %
MONOCYTES ABSOLUTE: 0.4 THOU/MM3 (ref 0.4–1.3)
MUCUS: ABNORMAL
NITRITE, URINE: POSITIVE
NUCLEATED RED BLOOD CELLS: 0 /100 WBC
PH UA: 5.5 (ref 5–9)
PLATELET # BLD: 349 THOU/MM3 (ref 130–400)
PMV BLD AUTO: 9.9 FL (ref 9.4–12.4)
POTASSIUM SERPL-SCNC: 4 MEQ/L (ref 3.5–5.2)
PROTEIN UA: NEGATIVE MG/DL
RBC # BLD: 4.7 MILL/MM3 (ref 4.2–5.4)
RBC URINE: ABNORMAL /HPF
SEG NEUTROPHILS: 70.1 %
SEGMENTED NEUTROPHILS ABSOLUTE COUNT: 4.9 THOU/MM3 (ref 1.8–7.7)
SODIUM BLD-SCNC: 144 MEQ/L (ref 135–145)
SPECIFIC GRAVITY UA: 1.02 (ref 1–1.03)
TOTAL PROTEIN: 7.6 G/DL (ref 6.1–8)
TRIGL SERPL-MCNC: 88 MG/DL (ref 0–199)
UROBILINOGEN, URINE: 0.2 EU/DL (ref 0–1)
VITAMIN B-12: 701 PG/ML (ref 211–911)
VITAMIN D 25-HYDROXY: 33 NG/ML (ref 30–100)
WBC # BLD: 7 THOU/MM3 (ref 4.8–10.8)
WBC UA: ABNORMAL /HPF

## 2020-12-02 PROCEDURE — 83036 HEMOGLOBIN GLYCOSYLATED A1C: CPT | Performed by: FAMILY MEDICINE

## 2020-12-02 PROCEDURE — 99214 OFFICE O/P EST MOD 30 MIN: CPT | Performed by: FAMILY MEDICINE

## 2020-12-02 NOTE — PROGRESS NOTES
[address]  [address] [place], [address]  Ph:   [phone]  Fax: [phone]    Provider:  Dr. Stanton Bailey    Patient:  Merritt Echavarria  YOB: 1950      Visit Date:  12/2/2020    Chief Complaint   Patient presents with    Follow-up     having some arm/shoulder pain on left side, having trouble lifting armfro 2 days        HPI:    Merritt Echavarria is a 71 y.o. female who comes today to the office for follow-up of Diabetes mellitus type II, hypertension, hypercholesterolemia and microalbuminuria. Today she complains of arm and shoulder pain in the left. She also has CKD II and sees Dr. Sudarshan Reyes.  Last visit was September 9, 2020 and he continued the Losartan, nifedipine, hydralazine, Lopressor and hydrochlorothiazide. Diabetes Mellitus: she is taking Metformin ER 1000 mg 1 tablet PO BID and Farxiga 5 mg 1 tablet PO daily. She denies side effects from the medicine. She denies  hyperglycemia, hypoglycemia , nausea, paresthesia of the feet, polydipsia, polyuria, visual disturbances and weight loss. HgbA1c today was 6.8%. She is up to date on Pneumovax and Prevnar. She does not like the influenza vaccine, she states she had a bad experience in the past.  She is due to date with his Ophthalmologist visits. Hypertension:  she is taking Losartan 50 mg 1 PO BID, HCTZ 25 mg 1 PO daily as needed, Hydralazine 50 mg 1 tablet PO BID, Nifedipine ER 90 mg 1 PO BID, and Metoprolol 100 mg 1 PO BID. She denies any side effects from the medications. She denies chest pain, dyspnea, exertional chest pressure/discomfort, fatigue, irregular heart beat, lower extremity edema, near-syncope, orthopnea, palpitations and syncope. Cardiovascular risk factors: advanced age (older than 54 for men, 72 for women), diabetes mellitus, dyslipidemia, hypertension, microalbuminuria, obesity (BMI >= 30 kg/m2) and sedentary lifestyle. Use of agents associated with hypertension: none.  History of target organ damage: transient ischemic attack. Hyperlipidemia:   she is taking Atorvastatin 40 mg 1 PO daily and denies any side effects from the medicines. Last labs done in February 2020 showed Total cholesterol of 228,, HDL 73, ,  Triglycerides 89. There is a family history of hyperlipidemia. There is not a family history of early ischemia heart disease. She tries to follow low cholesterol diet, but he does not exercises. Microalbuminuria: Microalbumin was 220 in February 19. She was placed on Losartan per Dr. Debbie Brionse, her nephrologist in September 2018 it went down to 44.1    Vitamin B 12 level was 212 (232-1245) in February 2019. She was given an order for methylmalonic acid but for unknown reasons was not done. She was taking the injections, but stopped them in September. has a current medication list which includes the following prescription(s): cholecalciferol, olive leaf, elderberry, hydralazine, losartan, atorvastatin, metformin, hydrochlorothiazide, farxiga, blood glucose test strips, metoprolol, nifedipine, insulin syringe 1cc/29g, hypodermic needle 16mw0-5/4\", cyanocobalamin, aspirin, and accu-chek multiclix lancets.     No Known Allergies    Past Medical History:   Diagnosis Date    Hyperlipidemia 2009    Hypertension 1973    TIA (transient ischemic attack) 2003    Type II or unspecified type diabetes mellitus without mention of complication, not stated as uncontrolled 2014       Review of Systems:     General ROS: negative for - chills, fatigue, fever, weight gain or weight loss  Psychological ROS: negative for - anxiety or depression  Lymphatic ROS: no swollen lymph nodes  Thyroid ROS: no enlarged thyroid  Hematologic ROS: no easy bruising  Respiratory ROS: no cough, shortness of breath, or wheezing  Cardiovascular ROS: no chest pain or dyspnea on exertion  Gastrointestinal ROS: negative for - abdominal pain, constipation, diarrhea or nausea/vomiting  Endocrine ROS: no polyuria, polydipsia, or increased apetite  Musculoskeletal ROS: negative for - muscle pain  Neurological ROS: negative for - dizziness or headaches  Skin ROS: no rashes    Physical Examination:     Blood pressure 124/80, pulse 70, temperature 97.7 °F (36.5 °C), temperature source Temporal, resp. rate 14, height 5' 3\" (1.6 m), weight 258 lb 3.2 oz (117.1 kg), SpO2 96 %, not currently breastfeeding. General appearance - alert, well appearing, and in no distress  Mental status - normal mood, behavior, speech, dress, motor activity, and thought processes  HEENT - NC, AT, PERRLA, EOMI, Anicteric sclerae  Ears - normal tympanic membranes bilaterally  Nose - normal turbinates, no drainage  Throat - no erythema or lesions, moist mucosas  Neck - supple, no significant adenopathy  Chest - clear to auscultation, no wheezes, rales or rhonchi, symmetric air entry  Heart - normal rate, regular rhythm, normal S1, S2, no murmurs, rubs, clicks or gallops  Abdomen - soft, nontender, nondistended, no masses or organomegaly  Neurological - alert, oriented, normal speech, no focal findings or movement disorder noted  Extremities - peripheral pulses normal, no pedal edema, no clubbing or cyanosis  Visual inspection:  Deformity/amputation: absent  Skin lesions/pre-ulcerative calluses: absent  Edema: right- negative, left- negative  Sensory exam:  Monofilament sensation: normal  (minimum of 5 random plantar locations tested, avoiding callused areas - > 1 area with absence of sensation is + for neuropathy)  Plus at least one of the following:  Pulses: normal,   Pinprick: Intact  Proprioception: Intact  Vibration (128 Hz): N/A    Assessment:     Diagnosis Orders   1. Type II diabetes mellitus, well controlled ([address]  Comprehensive Metabolic Panel   2. Hypercholesterolemia  Lipid Panel    Comprehensive Metabolic Panel   3.  Essential hypertension  CBC Auto Differential    Comprehensive Metabolic Panel    Urinalysis with Microscopic

## 2020-12-08 ENCOUNTER — TELEPHONE (OUTPATIENT)
Dept: FAMILY MEDICINE CLINIC | Age: 70
End: 2020-12-08

## 2020-12-08 NOTE — TELEPHONE ENCOUNTER
Spoke with patient regarding results. Patient verbalized understanding. Mailing Vitamin D and Lipid panel to patient to repeat in 6 month along with a copy of lab results. Patient mentioned it was previously discussed with provider if she needed to continue B12. Patient wasn't sure if injection could be discontinued and pill form would be okay.  Please Erle Render

## 2020-12-08 NOTE — TELEPHONE ENCOUNTER
Kike Keen MD   12/6/2020 12:28 PM       B12 and vitamin D within acceptable ranges.  Continue vitamin D 1000 international units every day.  Repeat in 6 months.  Bad cholesterol still elevated.  Make sure she is taking Lipitor 80 mg every day without skipping doses.  Will repeat in 6 months

## 2020-12-09 NOTE — TELEPHONE ENCOUNTER
Informed patient of provider response. Mailing out lab order to repeat. No questions from patient at this time.

## 2020-12-10 RX ORDER — NIFEDIPINE 90 MG/1
TABLET, FILM COATED, EXTENDED RELEASE ORAL
Qty: 180 TABLET | Refills: 3 | Status: SHIPPED | OUTPATIENT
Start: 2020-12-10 | End: 2021-12-06

## 2021-01-26 NOTE — TELEPHONE ENCOUNTER
Please approve or deny     Last Visit Date:  12/2/2020       Next Visit Date:    2/15/2021         Patient is wanting to know if she can receive a letter to give to unemployment listing her medical conditions that place her at a higher risk to contract Covid. Informed the patient I would have to check with provider.   Please advise

## 2021-02-18 RX ORDER — METOPROLOL TARTRATE 100 MG/1
TABLET ORAL
Qty: 180 TABLET | Refills: 3 | Status: SHIPPED | OUTPATIENT
Start: 2021-02-18 | End: 2022-02-14

## 2021-03-01 ENCOUNTER — OFFICE VISIT (OUTPATIENT)
Dept: FAMILY MEDICINE CLINIC | Age: 71
End: 2021-03-01
Payer: MEDICARE

## 2021-03-01 VITALS
TEMPERATURE: 97.9 F | HEIGHT: 63 IN | BODY MASS INDEX: 46.71 KG/M2 | DIASTOLIC BLOOD PRESSURE: 85 MMHG | SYSTOLIC BLOOD PRESSURE: 134 MMHG | RESPIRATION RATE: 10 BRPM | WEIGHT: 263.6 LBS | HEART RATE: 76 BPM

## 2021-03-01 DIAGNOSIS — Z00.00 ROUTINE GENERAL MEDICAL EXAMINATION AT A HEALTH CARE FACILITY: ICD-10-CM

## 2021-03-01 DIAGNOSIS — Z72.89 OTHER PROBLEMS RELATED TO LIFESTYLE: ICD-10-CM

## 2021-03-01 DIAGNOSIS — Z71.89 LIVING WILL, COUNSELING/DISCUSSION: Primary | ICD-10-CM

## 2021-03-01 DIAGNOSIS — Z23 NEED FOR PROPHYLACTIC VACCINATION AND INOCULATION AGAINST VARICELLA: ICD-10-CM

## 2021-03-01 DIAGNOSIS — Z12.31 ENCOUNTER FOR SCREENING MAMMOGRAM FOR BREAST CANCER: ICD-10-CM

## 2021-03-01 DIAGNOSIS — M81.8 OTHER OSTEOPOROSIS WITHOUT CURRENT PATHOLOGICAL FRACTURE: ICD-10-CM

## 2021-03-01 DIAGNOSIS — Z23 NEED FOR PROPHYLACTIC VACCINATION AGAINST STREPTOCOCCUS PNEUMONIAE (PNEUMOCOCCUS): ICD-10-CM

## 2021-03-01 PROCEDURE — 90732 PPSV23 VACC 2 YRS+ SUBQ/IM: CPT | Performed by: FAMILY MEDICINE

## 2021-03-01 PROCEDURE — G0472 HEP C SCREEN HIGH RISK/OTHER: HCPCS | Performed by: FAMILY MEDICINE

## 2021-03-01 PROCEDURE — G0438 PPPS, INITIAL VISIT: HCPCS | Performed by: FAMILY MEDICINE

## 2021-03-01 PROCEDURE — G0009 ADMIN PNEUMOCOCCAL VACCINE: HCPCS | Performed by: FAMILY MEDICINE

## 2021-03-01 ASSESSMENT — PATIENT HEALTH QUESTIONNAIRE - PHQ9
1. LITTLE INTEREST OR PLEASURE IN DOING THINGS: 0
SUM OF ALL RESPONSES TO PHQ QUESTIONS 1-9: 0
SUM OF ALL RESPONSES TO PHQ QUESTIONS 1-9: 0

## 2021-03-01 NOTE — PATIENT INSTRUCTIONS
Personalized Preventive Plan for Gagandeep Smith - 3/1/2021  Medicare offers a range of preventive health benefits. Some of the tests and screenings are paid in full while other may be subject to a deductible, co-insurance, and/or copay. Some of these benefits include a comprehensive review of your medical history including lifestyle, illnesses that may run in your family, and various assessments and screenings as appropriate. After reviewing your medical record and screening and assessments performed today your provider may have ordered immunizations, labs, imaging, and/or referrals for you. A list of these orders (if applicable) as well as your Preventive Care list are included within your After Visit Summary for your review. Other Preventive Recommendations:    · A preventive eye exam performed by an eye specialist is recommended every 1-2 years to screen for glaucoma; cataracts, macular degeneration, and other eye disorders. · A preventive dental visit is recommended every 6 months. · Try to get at least 150 minutes of exercise per week or 10,000 steps per day on a pedometer . · Order or download the FREE \"Exercise & Physical Activity: Your Everyday Guide\" from [employer]. Call 2-272.796.5062 or search [employer] online. · You need 8877-0371 mg of calcium and 6975-1576 IU of vitamin D per day. It is possible to meet your calcium requirement with diet alone, but a vitamin D supplement is usually necessary to meet this goal.  · When exposed to the sun, use a sunscreen that protects against both UVA and UVB radiation with an SPF of 30 or greater. Reapply every 2 to 3 hours or after sweating, drying off with a towel, or swimming. · Always wear a seat belt when traveling in a car. Always wear a helmet when riding a bicycle or motorcycle.     Preventing Osteoporosis: After Your Visit  Your Care Instructions Osteoporosis means the bones are weak and thin enough that they can break easily. The older you are, the more likely you are to get osteoporosis. But with plenty of calcium, vitamin D, and exercise, you can help prevent osteoporosis. The preteen and teen years are a key time for bone building. With the help of calcium, vitamin D, and exercise in those early years and beyond, the bones reach their peak density and strength by age 27. After age 27, your bones naturally start to thin and weaken. The stronger your bones are at around age 27, the lower your risk for osteoporosis. But no matter what your age and risk are, your bones still need calcium, vitamin D, and exercise to stay strong. Also avoid smoking, and limit alcohol. Smoking and heavy alcohol use can make your bones thinner. Talk to your doctor about any special risks you might have, such as having a close relative with osteoporosis or taking a medicine that can weaken bones. Your doctor can tell you the best ways to protect your bones from thinning. Follow-up care is a key part of your treatment and safety. Be sure to make and go to all appointments, and call your doctor if you are having problems. It's also a good idea to know your test results and keep a list of the medicines you take. How can you care for yourself at home? Get enough calcium and vitamin D. The [place] [employer] recommends adults younger than age 46 need 1,000 mg of calcium and 600 IU of vitamin D each day. Women ages 46 to 79 need 1,200 mg of calcium and 600 IU of vitamin D each day. Men ages 46 to 79 need 1,000 mg of calcium and 600 IU of vitamin D each day. Adults 71 and older need 1,200 mg of calcium and 800 IU of vitamin D each day. Eat foods rich in calcium, like yogurt, cheese, milk, and dark green vegetables. Eat foods rich in vitamin D, like eggs, fatty fish, cereal, and fortified milk. Get some sunshine. Your body uses sunshine to make its own vitamin D. The safest time to be out in the sun is before 10 a.m. or after 3 p.m. Avoid getting sunburned. Sunburn can increase your risk of skin cancer. Talk to your doctor about taking a calcium plus vitamin D supplement. Ask about what type of calcium is right for you, and how much to take at a time. Adults ages 23 to 48 should not get more than 2,500 mg of calcium and 4,000 IU of vitamin D each day, whether it is from supplements and/or food. Adults ages 46 and older should not get more than 2,000 mg of calcium and 4,000 IU of vitamin D each day from supplements and/or food. Get regular bone-building exercise. Weight-bearing and resistance exercises keep bones healthy by working the muscles and bones against gravity. Start out at an exercise level that feels right for you. Add a little at a time until you can do the following:  Do 30 minutes of weight-bearing exercise on most days of the week. Walking, jogging, stair climbing, and dancing are good choices. Do resistance exercises with weights or elastic bands 2 to 3 days a week. Limit alcohol. Drink no more than 1 alcohol drink a day if you are a woman. Drink no more than 2 alcohol drinks a day if you are a man. Do not smoke. Smoking can make bones thin faster. If you need help quitting, talk to your doctor about stop-smoking programs and medicines. These can increase your chances of quitting for good. When should you call for help? Watch closely for changes in your health, and be sure to contact your doctor if:  You need help with a healthy eating plan.   You need help with an exercise plan © 1117-7042 [employer]. Care instructions adapted under license by [facility]. This care instruction is for use with your licensed healthcare professional. If you have questions about a medical condition or this instruction, always ask your healthcare professional. [address] any warranty or liability for your use of this information. Content Version: 9.4.70701; Last Revised: June 20, 2011              ·   Patient information: Weight loss treatments    INTRODUCTION  Obesity is a major international problem, and Americans are among the heaviest people in the world. The percentage of obese people in the [place] has risen steadily. Many people find that although they initially lose weight by dieting, they quickly regain the weight after the diet ends. Because it so hard to keep weight off over time, it is important to have as much information and support as possible before starting a diet. You are most likely to be successful in losing weight and keeping it off when you believe that your body weight can be controlled. STARTING A WEIGHT LOSS PROGRAM  Some people like to talk to their doctor or nurse to get help choosing the best plan, monitoring progress, and getting advice and support along the way. To know what treatment (or combination of treatments) will work best, determine your body mass index (BMI) and waist circumference (measurement). The BMI is calculated from your height and weight. A person with a BMI between 25 and 29.9 is considered overweight   A person with a BMI of 30 or greater is considered to be obese  A waist circumference greater than 35 inches (88 cm) in women and 40 inches (102 cm) in men increases the risk of obesity-related complications, such as heart disease and diabetes. People who are obese and who have a larger waist size may need more aggressive weight loss treatment than others. Talk to your doctor or nurse for advice. Types of treatment  Based on your measurements and your medical history, your doctor or nurse can determine what combination of weight loss treatments would work best for you. Treatments may include changes in lifestyle, exercise, dieting, and, in some cases, weight loss medicines or weight loss surgery. Weight loss surgery, also called bariatric surgery, is reserved for people with severe obesity who have not responded to other weight loss treatments. SETTING A WEIGHT LOSS GOAL  It is important to set a realistic weight loss goal. Your first goal should be to avoid gaining more weight and staying at your current weight (or within 5 percent). Many people have a \"dream\" weight that is difficult or impossible to achieve. People at high risk of developing diabetes who are able to lose 5 percent of their body weight and maintain this weight will reduce their risk of developing diabetes by about 50 percent and reduce their blood pressure. This is a success. Losing more than 15 percent of your body weight and staying at this weight is an extremely good result, even if you never reach your \"dream\" or \"ideal\" weight. LIFESTYLE CHANGES  Programs that help you to change your lifestyle are usually run by psychologists or other professionals. The goals of lifestyle changes are to help you change your eating habits, become more active, and be more aware of how much you eat and exercise, helping you to make healthier choices. This type of treatment can be broken down into three steps: The triggers that make you want to eat   Eating   What happens after you eat  Triggers to eat  Determining what triggers you to eat involves figuring out what foods you eat and where and when you eat. To figure out what triggers you to eat, keep a record for a few days of everything you eat, the places where you eat, how often you eat, and the emotions you were feeling when you ate. For some people, the trigger is related to a certain time of day or night. For others, the trigger is related to a certain place, like sitting at a desk working. Eating  You can change your eating habits by breaking the chain of events between the trigger for eating and eating itself. There are many ways to do this. For instance, you can:  Limit where you eat to a few places (eg, dining room)   Restrict the number of utensils (eg, only a fork) used for eating   Drink a sip of water between each bite   Chew your food a certain number of times   Get up and stop eating every few minutes  What happens after you eat  Rewarding yourself for good eating behaviors can help you to develop better habits. This is not a reward for weight loss; instead, it is a reward for changing unhealthy behaviors. Do not use food as a reward. Some people find money, clothing, or personal care (eg, a hair cut, manicure, or massage) to be effective rewards. Treat yourself immediately after making better eating choices to reinforce the value of the good behavior. You need to have clear behavior goals, and you must have a time frame for reaching your goals. Reward small changes along the way to your final goal.  Other factors that contribute to successful weight loss  Changing your behavior involves more than just changing unhealthy eating habits; it also involves finding people around you to support your weight loss, reducing stress, and learning to be strong when tempted by food. Establish a \"nida\" system  Having a friend or family member available to provide support and reinforce good behavior is very helpful. The support person needs to understand your goals. Learn to be strong  Learning to be strong when tempted by food is an important part of losing weight. As an example, you will need to learn how to say \"no\" and continue to say no when urged to eat at parties and social gatherings. Develop strategies for events before you go, such as eating before you go or taking low-calorie snacks and drinks with you. Develop a support system  Having a support system is helpful when losing weight. This is why many commercial groups are successful. Family support is also essential; if your family does not support your efforts to lose weight, this can slow your progress or even keep you from losing weight. Positive thinking  People often have conversations with themselves in their head; these conversations can be positive or negative. If you eat a piece of cake that was not planned, you may respond by thinking, \"Oh, you stupid idiot, you've blown your diet! \" and as a result, you may eat more cake. A positive thought for the same event could be, \"Well, I ate cake when it was not on my plan. Now I should do something to get back on track. \" A positive approach is much more likely to be successful than a negative one. Reduce stress  Although stress is a part of everyday life, it can trigger uncontrolled eating in some people. It is important to find a way to get through these difficult times without eating or by eating low-calorie food, like raw vegetables. It may be helpful to imagine a relaxing place that allows you to temporarily escape from stress. With deep breaths and closed eyes, you can imagine this relaxing place for a few minutes. Self-help programs  Self-help programs like [place] Watchers®, Overeaters Anonymous®, and Take Off Fidelina (TOPS)© work for some people. As with all weight loss programs, you are most likely to be successful with these plans if you make long-term changes in how you eat. CHOOSING A DIET  A calorie is a unit of energy found in food. Your body needs calories to function. The goal of any diet is to burn up more calories than you eat. How quickly you lose weight depends upon several factors, such as your age, gender, and starting weight. Older people have a slower metabolism than young people, so they lose weight more slowly. Men lose more weight than women of similar height and weight when dieting because they use more energy. People who are extremely overweight lose weight more quickly than those who are only mildly overweight. Try not to drink alcohol or drinks with added sugar, and most sweets (candy, cakes, cookies), since they rarely contain important nutrients. Portion-controlled diets  One simple way to diet is to buy packaged foods, like frozen low-calorie meals or meal-replacement canned drinks. A typical meal plan for one day may include:  A meal-replacement drink or breakfast bar for breakfast   A meal-replacement drink or a frozen low-calorie (250 to 350 calories) meal for lunch   A frozen low-calorie meal or other prepackaged, calorie-controlled meal, along with extra vegetables for dinner  This would give you 1000 to 1500 calories per day. Low-fat diet  To reduce the amount of fat in your diet, you can:  Eat low-fat foods. Low-fat foods are those that contain less than 30 percent of calories from fat. Fat is listed on the food facts label (figure 1). Count fat grams. For a 1500 calorie diet, this would mean about 45 g or fewer of fat per day. Low-carbohydrate diet  Low- and very-low-carbohydrate diets (eg, Atkins diet, [employer]) have become popular ways to lose weight quickly.   With a very-low-carbohydrate diet, you eat between 0 and 60 grams of carbohydrates per day (a standard diet contains 200 to 300 grams of carbohydrates)   With a low-carbohydrate diet, you eat between 60 and 130 grams of carbohydrates per day Carbohydrates are found in fruits, vegetables, and grains (including breads, rice, pasta, and cereal), alcoholic beverages, and in dairy products. Meat and fish do not contain carbohydrates. Side effects of very-low-carbohydrate diets can include constipation, headache, bad breath, muscle cramps, diarrhea, and weakness. Mediterranean diet  The term \"Mediterranean diet\" refers to a way of eating that is common in olive-growing regions around the [facility]. Although there is some variation in Mediterranean diets, there are some similarities. Most Mediterranean diets include:  A high level of monounsaturated fats (from olive or canola oil, walnuts, pecans, almonds) and a low level of saturated fats (from butter)   A high amount of vegetables, fruits, legumes, and grains (7 to 10 servings of fruits and vegetables per day)   A moderate amount of milk and dairy products, mostly in the form of cheese. Use low-fat dairy products (skim milk, fat-free yogurt, low-fat cheese). A relatively low amount of red meat and meat products. Substitute fish or poultry for red meat. For those who drink alcohol, a modest amount (mainly as red wine) may help to protect against cardiovascular disease. A modest amount is up to one (4 ounce) glass per day for women and up to two glasses per day for men. Which diet is best?  Studies have compared different diets, including:  Very-low-carbohydrate (Atkins)   Macronutrient balance controlling glycemic load (Zone®)   Reduced-calorie (Weight Watchers®)   Very-low-fat (Ornish)  No one diet is \"best\" for weight loss. Any diet will help you to lose weight if you stick with the diet. Therefore, it is important to choose a diet that includes foods you like. Side effects of sibutramine include insomnia, dry mouth, and constipation. Increases in blood pressure can occur. Therefore, blood pressure is usually monitored during treatment. There is no evidence that sibutramine causes heart or lung problems (like dexfenfluramine and fenfluramine (Phen/Fen)). However, experts agree that sibutramine should not used by people with coronary heart disease, heart failure, uncontrolled hypertension, stroke, irregular heart rhythms, or peripheral vascular disease (poor circulation in the legs). Orlistat  Orlistat (Xenical® 120 mg capsules) is a medicine that reduces the amount of fat your body absorbs from the foods you eat. A lower-dose version is now available without a prescription (Sergey® 60 mg capsules) in many countries, including the [place]. The medicine is recommended three times per day, taken with a meal; you can skip a dose if you skip a meal or if the meal contains no fat. After one year of treatment with orlistat, the average weight loss is approximately 8 to 10 percent of initial body weight (4 percent more than in those who took a placebo). Cholesterol levels often improve, and blood pressure sometimes falls. In people with diabetes, orlistat may help control blood sugar levels. Side effects occur in 15 to 10 percent of people and may include stomach cramps, gas, diarrhea, leakage of stool, or oily stools. These problems are more likely when you take orlistat with a high-fat meal (if more than 30 percent of calories in the meal are from fat). Side effects usually improve as you learn to avoid high-fat foods. Severe liver injury has been reported rarely in patients taking orlistat, but it is not known if orlistat caused the liver problems. Diet supplements  Diet supplements are widely used by people who are trying to lose weight, although the safety and efficacy of these supplements are often unproven. A few of the more common diet supplements are discussed below; none of these are recommended because they have not been studied carefully, and there is no proof they are safe or effective. Chitosan and wheat dextrin are ineffective for weight loss, and their use is not recommended. Ephedra, a compound related to ephedrine, is no longer available in the [place] due to safety concerns. Many nonprescription diet pills previously contained ephedra. Although some studies have shown that ephedra helps with weight loss, there can be serious side effects (psychiatric symptoms, palpitations, and stomach upset), including death. There are not enough data about the safety and efficacy of chromium, ginseng, glucomannan, green tea, hydroxycitric acid, L carnitine, psyllium, pyruvate supplements, Cidra wort, and conjugated linoleic acid. Two supplements from [facility], [address] [place] (also known as the Anna Mendes 15 pill) and Herbathin dietary supplement, have been shown to contain prescription drugs. Hoodia gordonii is a dietary supplement derived from a plant in [place]. It is not recommended because there is no proof that it is safe or effective. Bitter orange (Citrus aurantium) can increase your heart rate and blood pressure and is not recommended. SHOULD I HAVE SURGERY TO LOSE WEIGHT?   Weight loss surgery is recommended ONLY for people with one of the following:  Severe obesity (body mass index above 40) (calculator 1 and calculator 2) who have not responded to diet, exercise, or weight loss medicines   Body mass index between 35 and 40, along with a serious medical problem (including diabetes, severe joint pain, or sleep apnea) that would improve with weight loss Lap banding is done through small incisions, with a laparoscope. The band can be adjusted after surgery, allowing you to eat more or less food. Adjustments to the size and tightness of the band are made by using a needle to add or remove fluid from a port (a small container under the skin that is connected to the band). Adding fluid to the band makes it tighter which restricts the amount of food you can eat and may help you to lose more weight. Lap banding is a popular choice because it is relatively simple to perform, can be adjusted or removed, and has a low risk of serious complications immediately after surgery. However, weight loss with the lap band depends on your ability to follow the program closely. You will need to prepare nutritious meals that [facility] with\" the band, not against it. For example, the lap band will not work well if you eat or drink a large amount of liquid calories (like ice cream). The band will not help you to feel full when you eat/drink liquid calories. Weight loss ranges from 45 to 75 percent after two years. As an example, a person who is 120 pounds overweight could expect to lose approximately 54 to 90 pounds in the two years after lap banding. Gastric bypass  Carole-en-Y gastric bypass, also called gastric bypass, helps you to lose weight by reducing the amount of food you can eat and reducing the number of calories and nutrients you absorb from the food you eat. To perform gastric bypass, a surgeon creates a small stomach pouch by dividing the stomach and attaching it to the small intestine. This helps you to lose weight in two ways: The smaller stomach can hold less food than before surgery. This causes you to feel full after eating a very small amount of food or liquid. Over time, the pouch might stretch, allowing you to eat more food. The body absorbs fewer calories, since food bypasses most of the stomach as well as the upper small intestine. This new arrangement seems to decrease your appetite and change how you break down foods by changing the release of various hormones. Gastric bypass can be performed as open surgery (through an incision on the abdomen) or laparoscopically, which uses smaller incisions and smaller instruments. Both the laparoscopic and open techniques have risks and benefits. You and your surgeon should work together to decide which surgery, if any, is right for you. Gastric bypass has a high success rate, and people lose an average of 62 to 68 percent of their excess body weight in the first year. Weight loss typically levels off after one to two years, with an overall excess weight loss between 50 and 75 percent. For a person who is 120 pounds overweight, an average of 60 to 90 pounds of weight loss would be expected. Gastric sleeve  Gastric sleeve, also known as sleeve gastrectomy, is a surgery that reduces the size of the stomach and makes it into a narrow tube (figure 3). The new stomach is much smaller and produces less of the hormone (ghrelin) that causes hunger, helping you feel satisfied with less food. Sleeve gastrectomy is safer than gastric bypass because the intestines are not rearranged, and there is less chance of malnutrition. It also appears to control hunger better than lap banding. It might be safer than the lap banding because no foreign materials are used. The gastric sleeve has a good success rate, and people lose an average of 33 percent of their excess body weight in the first year. For a person who is 120 pounds overweight, this would mean losing about 40 pounds in the first year. WEIGHT LOSS SURGERY COMPLICATIONS  A variety of complications can occur with weight loss surgery. The risks of surgery depend upon which surgery you have and any medical problems you had before surgery. Some of the more common early surgical complications (one to six weeks after surgery) include:  Bleeding   Infection   Blockage or tear in the bowels   Need for further surgery  Important medical complications after surgery can include blood clots in the legs or lungs, heart attack, pneumonia, and urinary tract infection. Complications are less likely when surgery is performed in centers that are experienced in weight loss surgery. In general, centers with experience in weight loss surgery have:  Board-certified doctors and surgeons   A team of support staff (dietitians, counselors, nurses)   Long-term follow-up after surgery   Hospital staff experienced with the care of weight loss patients. This includes nurses who are trained in the care of patients immediately after surgery and anesthesiologists who are experienced in caring for the morbidly obese. EFFECTIVENESS OF WEIGHT LOSS SURGERY  The goal of weight loss surgery is to reduce the risk of illness or death associated with obesity. Weight loss surgery can also help you to feel and look better, reduce the amount of money you spend on medicines, and cut down on sick days. As an example, weight loss surgery can improve health problems related to obesity (diabetes, high blood pressure, high cholesterol, sleep apnea) to the point that you need less or no medicine. Finally, weight loss surgery might reduce your risk of developing heart disease, cancer, and certain infections. AFTER WEIGHT LOSS SURGERY  You will need to stay in the hospital until your team feels that it is safe for you to leave (on average, one to three days). Do not drive if you are taking prescription pain medicine. Begin exercising as soon as possible once you have healed; most weight loss centers will design an exercise program for you. Once you are home, it is important to eat and drink exactly what your doctor and dietitian recommend. You will see your doctor, nurse, and dietitian on a regular basis after surgery to monitor your health, diet, and weight loss. You will be able to slowly increase how much you eat over time, although it will always be important to:  Eat small, frequent meals and not skip meals   Chew your food slowly and completely   Avoid eating while \"distracted\" (such as eating while watching TV)   Stop eating when you feel full   Drink liquids at least 30 minutes before or after eating   Avoid foods high in fat or sugar   Take vitamin supplements, as recommended  It can take several months to learn to listen to your body so that you know when you are hungry and when you are full. You may dislike foods you previously loved, and you may begin to prefer new foods. This can be a frustrating process for some people, so talk to your dietitian if you are having trouble. It usually takes between one and two years to lose weight after surgery. After reaching their goal weight, some people have plastic surgery (called \"body contouring\") to remove excess skin from the body, particularly in the abdominal area. Before you decide to have weight loss surgery, you must commit to staying healthy for life. This includes following up with your healthcare team, exercising most days of the week, and eating a sensible diet every day. It can be difficult to develop new eating and exercise habits after weight loss surgery, and you will have to work hard to stick to your goals. Quick \"study\" sessionsBefore a gathering, review who will be there so their names will be fresh in your mind. Establish routinesFor example, keep your keys, wallet, and umbrella in the same place all the time or take medicine with your 8:00 AM glass of juice   Live a Healthy Life   Many actions that will keep your body strong will do the same for your mind. For example:   Talk to Your Doctor About Herbs and Supplements    Malnutrition and vitamin deficiencies can impair your mental function. For example, vitamin B12 deficiency can cause a range of symptoms, including confusion. But, what if your nutritional needs are being met? Can herbs and supplements still offer a benefit? Researchers have investigated a range of natural remedies, such as ginkgo , ginseng , and the supplement phosphatidylserine (PS). So far, though, the evidence is inconsistent as to whether these products can improve memory or thinking. If you are interested in taking herbs and supplements, talk to your doctor first because they may interact with other medicines that you are taking. Exercise Regularly    Among the many benefits of regular exercise are increased blood flow to the brain and decreased risk of certain diseases that can interfere with memory function. One study found that even moderate exercise has a beneficial effect. Examples of \"moderate\" exercise include:   Playing 18 holes of golf once a week, without a cart   Playing tennis twice a week   Walking one mile per day   Manage Stress    It can be tough to remember what is important when your mind is cluttered. Make time for relaxation. Choose activities that calm you down, and make it routine.    Manage Chronic Conditions Side effects of high blood pressure , diabetes, and heart disease can interfere with mental function. Many of the lifestyle steps discussed here can help manage these conditions. Strive to eat a healthy diet, exercise regularly, get stress under control, and follow your doctor's advice for your condition. Minimize Medications    Talk to your doctor about the medicines that you take. Some may be unnecessary. Also, healthy lifestyle habits may lower the need for certain drugs. Last Reviewed: April 2010 Minus MD Rocael   Updated: 4/13/2010   ·     [address]       As we get older, changes in balance, gait, strength, vision, hearing, and cognition make even the most youthful senior more prone to accidents. Falls are one of the leading health risks for older people. This increased risk of falling is related to:   Aging process (eg, decreased muscle strength, slowed reflexes)   Higher incidence of chronic health problems (eg, arthritis, diabetes) that may limit mobility, agility or sensory awareness   Side effects of medicine (eg, dizziness, blurred vision)especially medicines like prescription pain medicines and drugs used to treat mental health conditions   Depending on the brittleness of your bones, the consequences of a fall can be serious and long lasting. Home Life   Research by the Association of [facility]) shows that some home accidents among older adults can be prevented by making simple lifestyle changes and basic modifications and repairs to the home environment. Here are some lifestyle changes that experts recommend:   Have your hearing and vision checked regularly. Be sure to wear prescription glasses that are right for you. Speak to your doctor or pharmacist about the possible side effects of your medicines. A number of medicines can cause dizziness. If you have problems with sleep, talk to your doctor. Limit your intake of alcohol. Are floor surfaces well maintained? Shag rugs, high-pile carpeting, tile floors, and polished wood floors can be particularly slippery. Stairs should always have handrails and be carpeted or fitted with a non-skid tread. Is your telephone easily reachable. Is the cord safely tucked away? Room by Room   According to the Association of Aging, bathrooms and jose are the two most potentially hazardous rooms in your home. In the Kitchen    Be sure your stove is in proper working order and always make sure burners and the oven are off before you go out or go to sleep. Keep pots on the back burners, turn handles away from the front of the stove, and keep stove clean and free of grease build-up. Kitchen ventilation systems and range exhausts should be working properly. Keep flammable objects such as towels and pot holders away from the cooking area except when in use. Make sure kitchen curtains are tied back. Move cords and appliances away from the sink and hot surfaces. If extension cords are needed, install wiring guides so they do not hang over the sink, range, or working areas. Look for coffee pots, kettles and toaster ovens with automatic shut-offs. Keep a mop handy in the kitchen so you can wipe up spills instantly. You should also have a small fire extinguisher. Arrange your kitchen with frequently used items on lower shelves to avoid the need to stand on a stepstool to reach them. Make sure countertops are well-lit to avoid injuries while cutting and preparing food. In the Bathroom    Use a non-slip mat or decals in the tub and shower, since wet, soapy tile or porcelain surfaces are extremely slippery. Make sure bathroom rugs are non-skid or tape them firmly to the floor. Bathtubs should have at least one, preferably two, grab bars, firmly attached to structural supports in the wall.  (Do not use built-in soap holders or glass shower doors as grab bars.) Tub seats fitted with non-slip material on the legs allow you to wash sitting down. For people with limited mobility, bathtub transfer benches allow you to slide safely into the tub. Raised toilet seats and toilet safety rails are helpful for those with knee or hip problems. In the [facility]    Make sure you use a nightlight and that the area around your bed is clear of potential obstacles. Be careful with electric blankets and never go to sleep with a heating pad, which can cause serious burns even if on a low setting. Use fire-resistant mattress covers and pillows, and NEVER smoke in bed. Keep a phone next to the bed that is programmed to dial 911 at the push of a button. If you have a chronic condition, you may want to sign on with an automatic call-in service. Typically the system includes a small pendant that connects directly to an emergency medical voice-response system. You should also make arrangements to stay in contact with someonefriend, neighbor, family memberon a regular schedule. Fire Prevention   According to the [employer]. (Smoke Alarms for Every) [address] [place] [address], senior citizens are one of the two highest risk groups for death and serious injuries due to residential fires. When cooking, wear short-sleeved items, never a bulky long-sleeved robe. The [facility]'s Safety Checklist for Older Consumers emphasizes the importance of checking basements, garages, workshops and storage areas for fire hazards, such as volatile liquids, piles of old rags or clothing and overloaded circuits. Never smoke in bed or when lying down on a couch or recliner chair. Small portable electric or kerosene heaters are responsible for many home fires and should be used cautiously if at all. If you do use one, be sure to keep them away from flammable materials. In case of fire, make sure you have a pre-established emergency exit plan. Have a professional check your fireplace and other fuel-burning appliances yearly. Helping Hands   Baby boomers entering the dinero years will continue to see the development of new products to help older adults live safely and independently in spite of age-related changes. Making Life More Livable  , by Novella Kayser, lists over 1,000 products for \"living well in the mature years,\" such as bathing and mobility aids, household security devices, ergonomically designed knives and peelers, and faucet valves and knobs for temperature control. Medical supply stores and organizations are good sources of information about products that improve your quality of life and insure your safety. Last Reviewed: November 2009 Devan Ferraro MD   Updated: 3/7/2011     ·        Advance Care Planning: Care Instructions  Your Care Instructions     It can be hard to live with an illness that cannot be cured. But if your health is getting worse, you may want to make decisions about end-of-life care. Planning for the end of your life does not mean that you are giving up. It is a way to make sure that your wishes are met. Clearly stating your wishes can make it easier for your loved ones. Making plans while you are still able may also ease your mind and make your final days less stressful and more meaningful. Follow-up care is a key part of your treatment and safety. Be sure to make and go to all appointments, and call your doctor if you are having problems. It's also a good idea to know your test results and keep a list of the medicines you take. What can you do to plan for the end of life? You can bring these issues up with your doctor. You do not need to wait until your doctor starts the conversation. You might start with \"I would not be willing to live with . Latrell Marinelli \" When you complete this sentence it helps your doctor understand your wishes. Consider a living will. A living will explains your wishes about life support and other treatments at the end of your life if you become unable to speak for yourself. Living watson tell doctors to use or not use treatments that would keep you alive. You must have one or two witnesses or a notary present when you sign this form. A living will may be called something else in your state. Consider a medical power of . This form allows you to name a person to make decisions about your care if you are not able to. Most people ask a close friend or family member. Talk to this person about the kinds of treatments you want and those that you do not want. Make sure this person understands your wishes. A medical power of  may be called something else in your state. These legal papers are simple to change. Tell your doctor what you want to change, and ask him or her to make a note in your medical file. Give your family updated copies of the papers. Where can you learn more? Go to [email or web address] and sign in to your [employer] account. Enter P184 in the [employer] box to learn more about \"Advance Care Planning: Care Instructions. \"     If you do not have an account, please click on the \"Sign Up Now\" link. Current as of: December 9, 2019               Content Version: 12.6  © 0724-6959 [employer]. Care instructions adapted under license by [facility] ([facility]). If you have questions about a medical condition or this instruction, always ask your healthcare professional. [place] [address] any warranty or liability for your use of this information.     ·

## 2021-03-01 NOTE — PROGRESS NOTES
Immunization(s) given during visit:    Immunizations Administered     Name Date Dose Route    Pneumococcal Polysaccharide ([id number]) 3/1/2021 0.5 mL Intramuscular    Site: Deltoid- Left    Lot: [id number]    NDC: 8723-3242-49          Most recent Vaccine Information Sheet dated YES given to pt

## 2021-03-01 NOTE — PROGRESS NOTES
Advance Care Planning   Advanced Care Planning: Discussed the patients choices for care and treatment in case of a health event that adversely affects decision-making abilities. Also discussed the patients long-term treatment options. Reviewed with the patient the [address] [place] [address] of [address] [place] [address] Declaration forms  Reviewed the process of designating a competent adult as an Agent (or -in-fact) that could take make health care decisions for the patient if incompetent. Patient was asked to complete the declaration forms, either acknowledge the forms by a public notary or an eligible witness and provide a signed copy to the practice office. Time spent (minutes): 3        Cardiovascular Disease Risk Counseling: Assessed the patient's risk to develop cardiovascular disease and reviewed main risk factors. Reviewed steps to reduce disease risk including:   · Quitting tobacco use, reducing amount smoked, or not starting the habit  · Making healthy food choices  · Being physically active and gradualy increasing activity levels   · Reduce weight and determine a healthy BMI goal  · Monitor blood pressure and treat if higher than 140/90 mmHg  · Maintain blood total cholesterol levels under 5 mmol/l or 190 mg/dl  · Maintain LDL cholesterol levels under 3.0 mmol/l or 115 mg/dl   · Control blood glucose levels  · Consider taking aspirin (75 mg daily), once blood pressure is controlled   Provided a follow up plan. Time spent (minutes): 5      Medicare Annual Wellness Visit    Name: Elidia Melo Date: 3/1/2021   MRN: [id number] Sex: Female   Age: 79 y.o.  Ethnicity: Non-/Non    : 1950 Race: Black      Gagandeep Sarah is here for Bernard WARNER Screenings for behavioral, psychosocial and functional/safety risks, and cognitive dysfunction are all negative except as indicated below. These results, as well as other patient data from the [address] form, are documented in Flowsheets linked to this Encounter. No Known Allergies      Prior to Visit Medications    Medication Sig Taking? Authorizing Provider   zoster recombinant adjuvanted vaccine (SHINGRIX) 50 MCG/0.5ML SUSR injection Inject 0.5 mLs into the muscle once for 1 dose Yes Unknown MD Inessa   metoprolol (LOPRESSOR) 100 MG tablet TAKE 1 TABLET TWICE A DAY Yes Ernie Dickerson MD   atorvastatin (LIPITOR) 80 MG tablet TAKE 1 TABLET DAILY Yes TIA Alvarez CNP   dapagliflozin (FARXIGA) 5 MG tablet Take 1 tablet by mouth every morning Yes Unknown MD Inessa   NIFEdipine (ADALAT CC) 90 MG extended release tablet TAKE 1 TABLET TWICE A DAY Yes Ernie Dickerson MD   Cholecalciferol (VITAMIN D3 PO) Take by mouth Yes Historical Provider, MD   OLIVE LEAF EXTRACT PO Take by mouth Yes Historical Provider, MD   ELDERBERRY PO Take by mouth Yes Historical Provider, MD   hydrALAZINE (APRESOLINE) 50 MG tablet TAKE 1 TABLET TWICE A DAY Yes Ernie Dickerson MD   losartan (COZAAR) 50 MG tablet TAKE 1 TABLET TWICE A DAY Yes Ernie Dickerson MD   metFORMIN (GLUCOPHAGE) 1000 MG tablet TAKE 1 TABLET TWICE A DAY WITH MEALS Yes TIA Alvarez CNP   hydroCHLOROthiazide (HYDRODIURIL) 25 MG tablet TAKE 1 TABLET DAILY Yes Ernie Dickerson MD   Accu-Chek Multiclix Lancets MISC 1 Units by Does not apply route 4 times daily Yes Unknown MD Inessa   blood glucose test strips (ACCU-CHEK ACTIVE STRIPS) strip 1 each by In Vitro route 4 times daily As needed.  Yes Unknown MD Inessa   INSULIN SYRINGE 1CC/29G 29G X 1/2\" 1 ML MISC 1 each by Does not apply route daily Yes Unknown MD Inessa   Needle, Disp, (HYPODERMIC NEEDLE 19SD2-2/4\") 27G X 1-1/4\" MISC 1 Units by Does not apply route daily Yes Unknown MD Inessa Patient's complete Health Risk Assessment and screening values have been reviewed and are found in Flowsheets. The following problems were reviewed today and where indicated follow up appointments were made and/or referrals ordered. Positive Risk Factor Screenings with Interventions:            General Health and ACP:  General  In general, how would you say your health is?: Good  In the past 7 days, have you experienced any of the following? New or Increased Pain, New or Increased Fatigue, Loneliness, Social Isolation, Stress or Anger?: None of These  Do you get the social and emotional support that you need?: Yes  Do you have a Living Will?: (!) No  Advance Directives     Power of [facility] Will ACP-Advance Directive ACP-Power of     Not on File Not on File Not on File Filed      General Health Risk Interventions:  · No Living Will: Patient referred to [facility] Habits/Nutrition:  Health Habits/Nutrition  Do you exercise for at least 20 minutes 2-3 times per week?: (!) No  Have you lost any weight without trying in the past 3 months?: No  Do you eat only one meal per day?: No  Have you seen the dentist within the past year?: (!) No  Body mass index: (!) 46.69  Health Habits/Nutrition Interventions:  · Inadequate physical activity:  patient agrees to increase physical activity as follows: start walking again.      Hearing/Vision:  No exam data present  Hearing/Vision  Do you or your family notice any trouble with your hearing that hasn't been managed with hearing aids?: No  Do you have difficulty driving, watching TV, or doing any of your daily activities because of your eyesight?: No  Have you had an eye exam within the past year?: (!) No  Hearing/Vision Interventions:  · Vision concerns:  patient encouraged to make appointment with his/her eye specialist      Personalized Preventive Plan   Current Health Maintenance Status  Immunization History Administered Date(s) Administered    Pneumococcal Conjugate 13-valent ([id number]) 01/14/2019    Tdap (Boostrix, Adacel) 01/14/2019        Health Maintenance   Topic Date Due    Hepatitis C screen  1950    COVID-19 Vaccine (1 of 2) 12/20/1966    Shingles Vaccine (1 of 2) 12/20/2000    Colon cancer screen colonoscopy  12/20/2000    Annual Wellness Visit (AWV)  05/29/2019    Pneumococcal 65+ years Vaccine (2 of 2 - PPSV23) 01/14/2020    Diabetic retinal exam  02/07/2020    Diabetic foot exam  07/22/2020    Flu vaccine (1) 09/01/2020    Breast cancer screen  02/04/2021    A1C test (Diabetic or Prediabetic)  12/02/2021    Lipid screen  12/02/2021    Potassium monitoring  12/02/2021    Creatinine monitoring  12/02/2021    DTaP/Tdap/Td vaccine (2 - Td) 01/14/2029    DEXA (modify frequency per FRAX score)  Completed    Hepatitis A vaccine  Aged Out    Hib vaccine  Aged Out    Meningococcal (ACWY) vaccine  Aged Out     Recommendations for [employer] Due: see orders and patient instructions/AVS.  . Recommended screening schedule for the next 5-10 years is provided to the patient in written form: see Patient Parveen Harrison was seen today for medicare awv. Diagnoses and all orders for this visit:    Living will, counseling/discussion  -     [facility]    Other osteoporosis without current pathological fracture  -     DEXA BONE DENSITY 2 SITES; Future    Encounter for screening mammogram for breast cancer  -     RADHA Digital Screen Bilateral [[id number]]; Future    Need for prophylactic vaccination against Streptococcus pneumoniae (pneumococcus)  -     Pneumococcal polysaccharide vaccine 23-valent PPSV23    Need for prophylactic vaccination and inoculation against varicella  -     zoster recombinant adjuvanted vaccine (SHINGRIX) 50 MCG/0.5ML SUSR injection;  Inject 0.5 mLs into the muscle once for 1 dose    Other problems related to lifestyle -     Hepatitis C Antibody;  Future  -     CO HEP C SCREEN HIGH RISK/OTHER []    Routine general medical examination at a health care facility             Paige Carrazna MD

## 2021-03-02 ENCOUNTER — TELEPHONE (OUTPATIENT)
Dept: SPIRITUAL SERVICES | Facility: CLINIC | Age: 71
End: 2021-03-02

## 2021-03-02 NOTE — TELEPHONE ENCOUNTER
General Advance Care Planning (ACP) Conversation    Date of Conversation: 3/2/2021  Conducted with: Patient with Decision Making Capacity    Content/Action Overview:  Has NO ACP documents/care preferences - information provided, considering goals and options   made an initial attempt to contact Michael Driver via telephone call to offer support, if desired, in the completion of Advance Directives documents as part of an [address] conversation. No answer. Left message.  will follow-up.     Length of Voluntary ACP Conversation in minutes:  [phone]

## 2021-03-05 ENCOUNTER — TELEPHONE (OUTPATIENT)
Dept: SPIRITUAL SERVICES | Facility: CLINIC | Age: 71
End: 2021-03-05

## 2021-03-05 NOTE — TELEPHONE ENCOUNTER
General Advance Care Planning (ACP) Conversation    Date of Conversation: 3/5/2021  Conducted with: Patient with Decision Making Capacity    Content/Action Overview:  Has NO ACP documents/care preferences - information provided, considering goals and options   made a 2nd attempt to contact Lilia Gallardo via telephone call to offer support, if desired, in the completion of Advance Directives documents as part of an [address] conversation.  spoke with her daughter, and explained the reason for the telephone call. She expressed a willingness to give Lilia Gallardo the message and information.  will follow-up.     Length of Voluntary ACP Conversation in minutes:  Hai Lucero

## 2021-03-08 ENCOUNTER — TELEPHONE (OUTPATIENT)
Dept: SPIRITUAL SERVICES | Facility: CLINIC | Age: 71
End: 2021-03-08

## 2021-03-23 ENCOUNTER — TELEPHONE (OUTPATIENT)
Dept: SPIRITUAL SERVICES | Facility: CLINIC | Age: 71
End: 2021-03-23

## 2021-03-23 NOTE — TELEPHONE ENCOUNTER
General Advance Care Planning (ACP) Conversation    Date of Conversation: 3/23/2021  Conducted with: Patient with Decision Making Capacity    Content/Action Overview:  Has NO ACP documents/care preferences - information provided, considering goals and options   made a follow-up attempt to contact Stephanie Humphrey via telephone call, as requested, for support in the completion of Advance [employer] documents as part of an [address] conversation.  had sent documents to her for review and following-up in order to schedule an appointment for completion. No answer. Left message.  will follow-up.     Length of Voluntary ACP Conversation in minutes:  [facility]

## 2021-03-25 ENCOUNTER — TELEPHONE (OUTPATIENT)
Dept: SPIRITUAL SERVICES | Facility: CLINIC | Age: 71
End: 2021-03-25

## 2021-03-29 DIAGNOSIS — E11.9 TYPE 2 DIABETES MELLITUS WITHOUT COMPLICATION, WITHOUT LONG-TERM CURRENT USE OF INSULIN (HCC): ICD-10-CM

## 2021-03-29 NOTE — TELEPHONE ENCOUNTER
Marielle Shi called requesting a refill on the following medications:  Requested Prescriptions     Pending Prescriptions Disp Refills    metFORMIN (GLUCOPHAGE) 1000 MG tablet 60 tablet 0     Sig: TAKE 1 TABLET TWICE A DAY WITH MEALS     Pharmacy verified:  [facility] and a 2 week supply to [facility] to be safe (pt would like a call back once this is done if approved)      Date of last visit: 3/1/21  Date of next visit (if applicable): 2/1/1795

## 2021-04-12 ENCOUNTER — NURSE ONLY (OUTPATIENT)
Dept: LAB | Age: 71
End: 2021-04-12

## 2021-04-12 DIAGNOSIS — I10 ESSENTIAL HYPERTENSION: ICD-10-CM

## 2021-04-12 DIAGNOSIS — Z72.89 OTHER PROBLEMS RELATED TO LIFESTYLE: ICD-10-CM

## 2021-04-12 LAB
ALBUMIN SERPL-MCNC: 4 G/DL (ref 3.5–5.1)
ALP BLD-CCNC: 97 U/L (ref 38–126)
ALT SERPL-CCNC: 14 U/L (ref 11–66)
ANION GAP SERPL CALCULATED.3IONS-SCNC: 13 MEQ/L (ref 8–16)
AST SERPL-CCNC: 14 U/L (ref 5–40)
BACTERIA: ABNORMAL
BILIRUB SERPL-MCNC: 0.3 MG/DL (ref 0.3–1.2)
BILIRUBIN URINE: NEGATIVE
BLOOD, URINE: NEGATIVE
BUN BLDV-MCNC: 13 MG/DL (ref 7–22)
CALCIUM SERPL-MCNC: 10.4 MG/DL (ref 8.5–10.5)
CASTS: ABNORMAL /LPF
CASTS: ABNORMAL /LPF
CHARACTER, URINE: ABNORMAL
CHLORIDE BLD-SCNC: 104 MEQ/L (ref 98–111)
CO2: 25 MEQ/L (ref 23–33)
COLOR: YELLOW
CREAT SERPL-MCNC: 0.8 MG/DL (ref 0.4–1.2)
CREATININE URINE: 103.1 MG/DL
CRYSTALS: ABNORMAL
EPITHELIAL CELLS, UA: ABNORMAL /HPF
GLUCOSE BLD-MCNC: 112 MG/DL (ref 70–108)
GLUCOSE, URINE: >= 1000 MG/DL
HEPATITIS C ANTIBODY: NEGATIVE
KETONES, URINE: NEGATIVE
LEUKOCYTE ESTERASE, URINE: ABNORMAL
MISCELLANEOUS LAB TEST RESULT: ABNORMAL
NITRITE, URINE: POSITIVE
PH UA: 5 (ref 5–9)
POTASSIUM SERPL-SCNC: 3.7 MEQ/L (ref 3.5–5.2)
PROT/CREAT RATIO, UR: 0.24
PROTEIN UA: NEGATIVE MG/DL
PROTEIN, URINE: 24.9 MG/DL
RBC URINE: ABNORMAL /HPF
RENAL EPITHELIAL, UA: ABNORMAL
SODIUM BLD-SCNC: 142 MEQ/L (ref 135–145)
SPECIFIC GRAVITY UA: 1.02 (ref 1–1.03)
TOTAL PROTEIN: 8.2 G/DL (ref 6.1–8)
UROBILINOGEN, URINE: 0.2 EU/DL (ref 0–1)
WBC UA: > 100 /HPF
YEAST: ABNORMAL

## 2021-04-15 ENCOUNTER — OFFICE VISIT (OUTPATIENT)
Dept: NEPHROLOGY | Age: 71
End: 2021-04-15
Payer: MEDICARE

## 2021-04-15 VITALS
TEMPERATURE: 97.3 F | HEART RATE: 73 BPM | SYSTOLIC BLOOD PRESSURE: 135 MMHG | DIASTOLIC BLOOD PRESSURE: 82 MMHG | WEIGHT: 264.4 LBS | BODY MASS INDEX: 46.84 KG/M2 | OXYGEN SATURATION: 96 %

## 2021-04-15 DIAGNOSIS — E11.21 DIABETIC NEPHROPATHY ASSOCIATED WITH TYPE 2 DIABETES MELLITUS (HCC): ICD-10-CM

## 2021-04-15 DIAGNOSIS — I10 ESSENTIAL HYPERTENSION: Primary | ICD-10-CM

## 2021-04-15 PROCEDURE — 99213 OFFICE O/P EST LOW 20 MIN: CPT | Performed by: INTERNAL MEDICINE

## 2021-04-15 NOTE — PROGRESS NOTES
comfortable, no distress  Oral: moist oral mucus membranes  Neck: No jugular venous distention  Lungs: Air entry B/L, no crackles or rales, no use of accessory muscles  Heart: S1, S2 heard  GI: soft, non-tender, no guarding  Extremities: trace ankle LE edema     Medications:  Current Outpatient Medications   Medication Sig Dispense Refill    metFORMIN (GLUCOPHAGE) 1000 MG tablet TAKE 1 TABLET TWICE A DAY WITH MEALS 60 tablet 0    metoprolol (LOPRESSOR) 100 MG tablet TAKE 1 TABLET TWICE A  tablet 3    atorvastatin (LIPITOR) 80 MG tablet TAKE 1 TABLET DAILY 90 tablet 1    dapagliflozin (FARXIGA) 5 MG tablet Take 1 tablet by mouth every morning 90 tablet 1    NIFEdipine (ADALAT CC) 90 MG extended release tablet TAKE 1 TABLET TWICE A  tablet 3    Cholecalciferol (VITAMIN D3 PO) Take by mouth      OLIVE LEAF EXTRACT PO Take by mouth      ELDERBERRY PO Take by mouth      hydrALAZINE (APRESOLINE) 50 MG tablet TAKE 1 TABLET TWICE A  tablet 3    losartan (COZAAR) 50 MG tablet TAKE 1 TABLET TWICE A  tablet 3    hydroCHLOROthiazide (HYDRODIURIL) 25 MG tablet TAKE 1 TABLET DAILY 90 tablet 3    blood glucose test strips (ACCU-CHEK ACTIVE STRIPS) strip 1 each by In Vitro route 4 times daily As needed. 200 each 3    INSULIN SYRINGE 1CC/29G 29G X 1/2\" 1 ML MISC 1 each by Does not apply route daily 30 each 0    Needle, Disp, (HYPODERMIC NEEDLE 11IY2-7/4\") 27G X 1-1/4\" MISC 1 Units by Does not apply route daily 30 each 5    cyanocobalamin 1000 MCG/ML injection Inject 1 cc IM daily for 1 week, then 1 injectio weekly for 1 months, then 1 injection mointhly (Patient taking differently: 1,000 mcg every 30 days ) 10 mL 1    aspirin 81 MG tablet Take 81 mg by mouth daily      Accu-Chek Multiclix Lancets MISC 1 Units by Does not apply route 4 times daily 204 each 1     No current facility-administered medications for this visit.          Laboratory & Diagnostics:  Sept 2018: Creat 0.9, K 4.6  Sept 2017: UA: trace blood, trace protein UPCR 49 mg/g     Dec 2018: R 9.9, L 10.2, no renal artery stenosis  Feb 2019: , UPCR 220 mg/g, AIC 7%  Aldosterone 9.6, renin less than 0.167    Feb 2019: K 4.5, Creat 0.73, , UPCR 220 mg/g  Aug 2019: creat 0.93, UACR 44 mg/g  February 2020. UA: (-) Blood/protein  March 2020 immunofixation negative  Sept 2020: K 4.2, Creat 0.7, UPCR 230 mg/g  April 2021: K 3.7, creat 0.8, Albumin 4.0, UPCR 240 mg/g, immunofixation pending, UA no blood, no protein     Impression/Plan:   1. HTN:  Likely essential.  No renal artery stenosis on prior scans. Continue with cozaar, nifedipine, hydralazine, lopressor and HCTZ. Blood pressure in office today is 135/82  2. 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 Not much proteinuria. She is on cozaar. Continue with current mgmt. Doing well overall. 3. Dyslipidemia: on lipitor. 4. DM: on metformin and jardiance. 5. Advised weight loss. Orders Placed This Encounter   Procedures    Basic Metabolic Panel    Protein / creatinine ratio, urine     Return in about 1 year (around 4/15/2022).     Ivan Espinal MD  [facility]

## 2021-04-19 LAB — IMMUNOFIXATION ELECTROPHORESIS: NORMAL

## 2021-04-20 ENCOUNTER — HOSPITAL ENCOUNTER (OUTPATIENT)
Dept: WOMENS IMAGING | Age: 71
Discharge: HOME | End: 2021-04-20
Payer: MEDICARE

## 2021-04-20 DIAGNOSIS — Z12.31 ENCOUNTER FOR SCREENING MAMMOGRAM FOR BREAST CANCER: ICD-10-CM

## 2021-04-20 DIAGNOSIS — M81.8 OTHER OSTEOPOROSIS WITHOUT CURRENT PATHOLOGICAL FRACTURE: ICD-10-CM

## 2021-04-20 LAB — GFR SERPL CREATININE-BSD FRML MDRD: 86 ML/MIN/1.73M2

## 2021-04-20 PROCEDURE — 77063 BREAST TOMOSYNTHESIS BI: CPT

## 2021-04-20 PROCEDURE — 77080 DXA BONE DENSITY AXIAL: CPT

## 2021-04-21 ENCOUNTER — TELEPHONE (OUTPATIENT)
Dept: FAMILY MEDICINE CLINIC | Age: 71
End: 2021-04-21

## 2021-04-21 NOTE — TELEPHONE ENCOUNTER
Paola Flores MD   4/21/2021  9:33 AM EDT      Her bone density showed that she had osteopenia, placing her at medium risk for fractures. [facility] sure she is taking calcium 1200 mg a day plus vitamin D at 1000 international units every day.  Will repeat in 2 years.

## 2021-04-29 DIAGNOSIS — E11.9 TYPE 2 DIABETES MELLITUS WITHOUT COMPLICATION, WITHOUT LONG-TERM CURRENT USE OF INSULIN (HCC): ICD-10-CM

## 2021-06-17 ENCOUNTER — OFFICE VISIT (OUTPATIENT)
Dept: FAMILY MEDICINE CLINIC | Age: 71
End: 2021-06-17
Payer: MEDICARE

## 2021-06-17 VITALS
SYSTOLIC BLOOD PRESSURE: 125 MMHG | RESPIRATION RATE: 12 BRPM | BODY MASS INDEX: 48.21 KG/M2 | TEMPERATURE: 97.6 F | WEIGHT: 262 LBS | HEIGHT: 62 IN | DIASTOLIC BLOOD PRESSURE: 78 MMHG | HEART RATE: 67 BPM

## 2021-06-17 DIAGNOSIS — I10 ESSENTIAL HYPERTENSION: ICD-10-CM

## 2021-06-17 DIAGNOSIS — E55.9 VITAMIN D DEFICIENCY: ICD-10-CM

## 2021-06-17 DIAGNOSIS — E11.9 TYPE 2 DIABETES MELLITUS WITHOUT COMPLICATION, WITHOUT LONG-TERM CURRENT USE OF INSULIN (HCC): Primary | ICD-10-CM

## 2021-06-17 DIAGNOSIS — E53.8 VITAMIN B 12 DEFICIENCY: ICD-10-CM

## 2021-06-17 DIAGNOSIS — E78.00 HYPERCHOLESTEROLEMIA: ICD-10-CM

## 2021-06-17 DIAGNOSIS — R80.9 MICROALBUMINURIA: ICD-10-CM

## 2021-06-17 LAB — HBA1C MFR BLD: 6.6 %

## 2021-06-17 PROCEDURE — 83036 HEMOGLOBIN GLYCOSYLATED A1C: CPT | Performed by: FAMILY MEDICINE

## 2021-06-17 PROCEDURE — 99214 OFFICE O/P EST MOD 30 MIN: CPT | Performed by: FAMILY MEDICINE

## 2021-06-17 SDOH — ECONOMIC STABILITY: FOOD INSECURITY: WITHIN THE PAST 12 MONTHS, YOU WORRIED THAT YOUR FOOD WOULD RUN OUT BEFORE YOU GOT MONEY TO BUY MORE.: PATIENT DECLINED

## 2021-06-17 SDOH — ECONOMIC STABILITY: FOOD INSECURITY: WITHIN THE PAST 12 MONTHS, THE FOOD YOU BOUGHT JUST DIDN'T LAST AND YOU DIDN'T HAVE MONEY TO GET MORE.: PATIENT DECLINED

## 2021-06-17 ASSESSMENT — SOCIAL DETERMINANTS OF HEALTH (SDOH): HOW HARD IS IT FOR YOU TO PAY FOR THE VERY BASICS LIKE FOOD, HOUSING, MEDICAL CARE, AND HEATING?: PATIENT DECLINED

## 2021-06-17 NOTE — PROGRESS NOTES
[address]  [address] [facility], [address]  Ph:   [phone]  Fax: [phone]    Provider:  Dr. Holly Harry    Patient:  Mariely Gross  YOB: 1950      Visit Date:  6/17/2021    Chief Complaint   Patient presents with    Follow-up    Hypertension    Hyperlipidemia    Diabetes       HPI:    Mariely Gross is a 79 y.o. female who comes today to the office for follow-up of Diabetes mellitus type II, hypertension, hypercholesterolemia and microalbuminuria. She also has CKD II and sees Dr. Janine Rojas.  Last visit was April 15, 2021 and he continued the Losartan, nifedipine, hydralazine, Lopressor and hydrochlorothiazide. Next visit in 1 year. Diabetes Mellitus: she is taking Metformin ER 1000 mg 1 tablet PO BID and Farxiga 5 mg 1 tablet PO daily. She denies side effects from the medicine. Blood sugars have been  Between 120-140 in the morning. HgbA1c today was 6.6%. She is up to date on Pneumovax and Prevnar. She does not like the influenza vaccine. She does not want to get the Covid vaccine. Hypertension:  she is taking Losartan 50 mg 1 PO BID, HCTZ 25 mg 1 PO daily as needed (average 1 a week), Hydralazine 50 mg 1 tablet PO BID, Nifedipine ER 90 mg 1 PO BID, and Metoprolol 100 mg 1 PO BID. She denies any side effects from the medications. She denies chest pain, dyspnea, exertional chest pressure/discomfort, fatigue, irregular heart beat, lower extremity edema, near-syncope, orthopnea, palpitations and syncope. Cardiovascular risk factors: advanced age (older than 54 for men, 72 for women), diabetes mellitus, dyslipidemia, hypertension, microalbuminuria, obesity (BMI >= 30 kg/m2) and sedentary lifestyle. Use of agents associated with hypertension: none. History of target organ damage: transient ischemic attack. Hyperlipidemia:   she is taking Atorvastatin 80 mg 1 PO daily and denies any side effects from the medicines.     Last labs done in December 2, 2020  showed Total cholesterol 239, HDL 61, ,  Triglycerides 88. There is a family history of hyperlipidemia. There is not a family history of early ischemia heart disease. She tries to follow low cholesterol diet, but he does not exercises. Microalbuminuria: she is taking Losartan 50 mg PO BID per Dr. Jamir Araiza, her nephrologist, spring September 2018. Originaly it was 220, but it cam down to 44.1 in September 2019 and has been around there since then. Vitamin B 12 level was 212 (232-1245) in February 2019. She is taking tablets of 1000 mcg daily at the present time. Last level done in December 2020 was 701. Vitamin D deficiency:  She is taking 5000 IU daily. ,  Last level was 33 in December 2020.       has a current medication list which includes the following prescription(s): calcium, cyanocobalamin, dapagliflozin, metformin, metoprolol, atorvastatin, nifedipine, cholecalciferol, olive leaf, elderberry, hydralazine, losartan, hydrochlorothiazide, accu-chek multiclix lancets, blood glucose test strips, insulin syringe 1cc/29g, hypodermic needle 43rc8-7/4\", and aspirin.     No Known Allergies    Past Medical History:   Diagnosis Date    Hyperlipidemia 2009    Hypertension 1973    TIA (transient ischemic attack) 2003    Type II or unspecified type diabetes mellitus without mention of complication, not stated as uncontrolled 2014       Review of Systems:     General ROS: negative for - chills, fatigue, fever, weight gain or weight loss  Psychological ROS: negative for - anxiety or depression  Lymphatic ROS: no swollen lymph nodes  Thyroid ROS: no enlarged thyroid  Hematologic ROS: no easy bruising  Respiratory ROS: no cough, shortness of breath, or wheezing  Cardiovascular ROS: no chest pain or dyspnea on exertion  Gastrointestinal ROS: negative for - abdominal pain, constipation, diarrhea or nausea/vomiting  Endocrine ROS: no polyuria, polydipsia, or increased apetite  Musculoskeletal ROS: negative for - muscle pain  Neurological ROS: negative for - dizziness or headaches  Skin ROS: no rashes    Physical Examination:     Blood pressure 125/78, pulse 67, temperature 97.6 °F (36.4 °C), temperature source Temporal, resp. rate 12, height 5' 2\" (1.575 m), weight 262 lb (118.8 kg), not currently breastfeeding. General appearance - alert, well appearing, and in no distress  Mental status - normal mood, behavior, speech, dress, motor activity, and thought processes  HEENT - NC, AT, PERRLA, EOMI, Anicteric sclerae  Ears - normal tympanic membranes bilaterally  Nose - normal turbinates, no drainage  Throat - no erythema or lesions, moist mucosas  Neck - supple, no significant adenopathy  Chest - clear to auscultation, no wheezes, rales or rhonchi, symmetric air entry  Heart - normal rate, regular rhythm, normal S1, S2, no murmurs, rubs, clicks or gallops  Abdomen - soft, nontender, nondistended, no masses or organomegaly  Neurological - alert, oriented, normal speech, no focal findings or movement disorder noted  Extremities - peripheral pulses normal, no pedal edema, no clubbing or cyanosis      Assessment:     Diagnosis Orders   1. Type 2 diabetes mellitus without complication, without long-term current use of insulin (formerly [facility])  POCT glycosylated hemoglobin (Hb A1C)    Hemoglobin A1C   2. Hypercholesterolemia  Lipid Panel    TSH without Reflex   3. Vitamin D deficiency  Vitamin D 25 Hydroxy   4. Vitamin B 12 deficiency  Vitamin B12   5. Essential hypertension  CBC Auto Differential    Comprehensive Metabolic Panel    Urinalysis with Microscopic   6. Microalbuminuria     7.  Adult BMI 45.0-49.9 kg/sq m [facility])         Plan:  Decrease Metformin ER 1000 mg to 1 tablet PO daily   Increase Farxiga to 10 mg mg 1 tablet PO daily  Continue Losartan 50 mg 1 tablet PO BID  Continue HCTZ 25 mg 1 tablet PO daily as needed  Continue Hydralazine 50 mg 1 tablet PO BID  Continue Nifedipine ER 90

## 2021-08-02 RX ORDER — ATORVASTATIN CALCIUM 80 MG/1
TABLET, FILM COATED ORAL
Qty: 90 TABLET | Refills: 3 | Status: SHIPPED | OUTPATIENT
Start: 2021-08-02 | End: 2022-07-28

## 2021-08-02 NOTE — TELEPHONE ENCOUNTER
This medication refill is regarding a electronic request.  Refill requested by [facility]. Requested Prescriptions     Pending Prescriptions Disp Refills    atorvastatin (LIPITOR) 80 MG tablet [Pharmacy Med Name: ATORVASTATIN TABS 80MG] 90 tablet 3     Sig: TAKE 1 TABLET DAILY       Date of last visit: 6/17/2021   Date of next visit: 10/18/2021  Date of last refill: 2/3/2021  90/1    Last Lipid Panel:    Lab Results   Component Value Date    CHOL 239 12/02/2020    TRIG 88 12/02/2020    HDL 61 12/02/2020    LDLCALC 160 12/02/2020       Rx verified, ordered and set to EP. CC: hyponatremia

## 2021-10-04 DIAGNOSIS — E11.9 TYPE 2 DIABETES MELLITUS WITHOUT COMPLICATION, WITHOUT LONG-TERM CURRENT USE OF INSULIN (HCC): ICD-10-CM

## 2021-10-18 RX ORDER — HYDROCHLOROTHIAZIDE 25 MG/1
TABLET ORAL
Qty: 90 TABLET | Refills: 3 | Status: SHIPPED | OUTPATIENT
Start: 2021-10-18 | End: 2022-10-13

## 2021-11-05 ENCOUNTER — NURSE ONLY (OUTPATIENT)
Dept: LAB | Age: 71
End: 2021-11-05

## 2021-11-05 DIAGNOSIS — E53.8 VITAMIN B 12 DEFICIENCY: ICD-10-CM

## 2021-11-05 DIAGNOSIS — E11.9 TYPE 2 DIABETES MELLITUS WITHOUT COMPLICATION, WITHOUT LONG-TERM CURRENT USE OF INSULIN (HCC): ICD-10-CM

## 2021-11-05 DIAGNOSIS — I10 ESSENTIAL HYPERTENSION: ICD-10-CM

## 2021-11-05 DIAGNOSIS — E55.9 VITAMIN D DEFICIENCY: ICD-10-CM

## 2021-11-05 DIAGNOSIS — E78.00 HYPERCHOLESTEROLEMIA: ICD-10-CM

## 2021-11-05 LAB
ALBUMIN SERPL-MCNC: 4.2 G/DL (ref 3.5–5.1)
ALP BLD-CCNC: 110 U/L (ref 38–126)
ALT SERPL-CCNC: 12 U/L (ref 11–66)
ANION GAP SERPL CALCULATED.3IONS-SCNC: 15 MEQ/L (ref 8–16)
AST SERPL-CCNC: 14 U/L (ref 5–40)
AVERAGE GLUCOSE: 183 MG/DL (ref 70–126)
BASOPHILS # BLD: 0.5 %
BASOPHILS ABSOLUTE: 0 THOU/MM3 (ref 0–0.1)
BILIRUB SERPL-MCNC: 0.3 MG/DL (ref 0.3–1.2)
BUN BLDV-MCNC: 17 MG/DL (ref 7–22)
CALCIUM SERPL-MCNC: 9.9 MG/DL (ref 8.5–10.5)
CHLORIDE BLD-SCNC: 106 MEQ/L (ref 98–111)
CHOLESTEROL, TOTAL: 209 MG/DL (ref 100–199)
CO2: 20 MEQ/L (ref 23–33)
CREAT SERPL-MCNC: 0.8 MG/DL (ref 0.4–1.2)
EOSINOPHIL # BLD: 1.2 %
EOSINOPHILS ABSOLUTE: 0.1 THOU/MM3 (ref 0–0.4)
ERYTHROCYTE [DISTWIDTH] IN BLOOD BY AUTOMATED COUNT: 15.4 % (ref 11.5–14.5)
ERYTHROCYTE [DISTWIDTH] IN BLOOD BY AUTOMATED COUNT: 55.8 FL (ref 35–45)
GLUCOSE BLD-MCNC: 127 MG/DL (ref 70–108)
HBA1C MFR BLD: 8.1 % (ref 4.4–6.4)
HCT VFR BLD CALC: 47.5 % (ref 37–47)
HDLC SERPL-MCNC: 69 MG/DL
HEMOGLOBIN: 14.7 GM/DL (ref 12–16)
IMMATURE GRANS (ABS): 0.01 THOU/MM3 (ref 0–0.07)
IMMATURE GRANULOCYTES: 0.2 %
LDL CHOLESTEROL CALCULATED: 122 MG/DL
LYMPHOCYTES # BLD: 29.9 %
LYMPHOCYTES ABSOLUTE: 1.8 THOU/MM3 (ref 1–4.8)
MCH RBC QN AUTO: 30.4 PG (ref 26–33)
MCHC RBC AUTO-ENTMCNC: 30.9 GM/DL (ref 32.2–35.5)
MCV RBC AUTO: 98.3 FL (ref 81–99)
MONOCYTES # BLD: 10 %
MONOCYTES ABSOLUTE: 0.6 THOU/MM3 (ref 0.4–1.3)
NUCLEATED RED BLOOD CELLS: 0 /100 WBC
PLATELET # BLD: 332 THOU/MM3 (ref 130–400)
PMV BLD AUTO: 11.2 FL (ref 9.4–12.4)
POTASSIUM SERPL-SCNC: 4.3 MEQ/L (ref 3.5–5.2)
RBC # BLD: 4.83 MILL/MM3 (ref 4.2–5.4)
SEG NEUTROPHILS: 58.2 %
SEGMENTED NEUTROPHILS ABSOLUTE COUNT: 3.4 THOU/MM3 (ref 1.8–7.7)
SODIUM BLD-SCNC: 141 MEQ/L (ref 135–145)
TOTAL PROTEIN: 7.3 G/DL (ref 6.1–8)
TRIGL SERPL-MCNC: 90 MG/DL (ref 0–199)
TSH SERPL DL<=0.05 MIU/L-ACNC: 2.75 UIU/ML (ref 0.4–4.2)
VITAMIN B-12: 873 PG/ML (ref 211–911)
VITAMIN D 25-HYDROXY: 38 NG/ML (ref 30–100)
WBC # BLD: 5.9 THOU/MM3 (ref 4.8–10.8)

## 2021-11-08 ENCOUNTER — OFFICE VISIT (OUTPATIENT)
Dept: FAMILY MEDICINE CLINIC | Age: 71
End: 2021-11-08
Payer: MEDICARE

## 2021-11-08 VITALS
DIASTOLIC BLOOD PRESSURE: 70 MMHG | SYSTOLIC BLOOD PRESSURE: 139 MMHG | HEIGHT: 62 IN | BODY MASS INDEX: 47.88 KG/M2 | HEART RATE: 66 BPM | WEIGHT: 260.2 LBS

## 2021-11-08 DIAGNOSIS — E11.22 TYPE 2 DIABETES MELLITUS WITH CHRONIC KIDNEY DISEASE, WITHOUT LONG-TERM CURRENT USE OF INSULIN, UNSPECIFIED CKD STAGE (HCC): Primary | ICD-10-CM

## 2021-11-08 DIAGNOSIS — E78.00 HYPERCHOLESTEROLEMIA: ICD-10-CM

## 2021-11-08 DIAGNOSIS — I10 ESSENTIAL HYPERTENSION: ICD-10-CM

## 2021-11-08 DIAGNOSIS — E55.9 VITAMIN D DEFICIENCY: ICD-10-CM

## 2021-11-08 DIAGNOSIS — E53.8 VITAMIN B 12 DEFICIENCY: ICD-10-CM

## 2021-11-08 PROBLEM — E11.65 TYPE 2 DIABETES MELLITUS WITH HYPERGLYCEMIA (HCC): Status: ACTIVE | Noted: 2021-11-08

## 2021-11-08 LAB — GFR SERPL CREATININE-BSD FRML MDRD: 71 ML/MIN/1.73M2

## 2021-11-08 PROCEDURE — 99214 OFFICE O/P EST MOD 30 MIN: CPT | Performed by: FAMILY MEDICINE

## 2021-11-08 PROCEDURE — 3052F HG A1C>EQUAL 8.0%<EQUAL 9.0%: CPT | Performed by: FAMILY MEDICINE

## 2021-11-08 NOTE — PROGRESS NOTES
[facility]  [facility] [address] [facility], [address]  Ph:   [phone]  Fax: [phone]    Provider:  Dr. Francisco Berrios    Patient:  Jermaine Krishna  YOB: 1950      Visit Date:  11/8/2021    Chief Complaint   Patient presents with   [facility]     Pt requesting routine exam        HPI:    Jermaine Krishna is a 79 y.o. female who comes today to the office for follow-up of Diabetes mellitus type II, hypertension, hypercholesterolemia and microalbuminuria. Diabetes Mellitus: Last visit Metformin 1000 mg p.o. twice daily was decreased to 1000 p.o. daily and Gio Magyar was increased to 10 mg 1 tablet mouth daily. Her blood sugars have not been doing as well. At that time her hemoglobin A1c was 6.6%. November 5, 2021 it was 8.1%. She denies any side effects from the medication. She is up to date on Pneumovax and Prevnar. She does not get the influenza vaccine. She does not want to get the Covid vaccine either. Hypertension:  she is taking Losartan 50 mg 1 PO BID, HCTZ 25 mg 1 PO daily as needed (average 2 a week), Hydralazine 50 mg 1 tablet PO BID, Nifedipine ER 90 mg 1 PO BID, and Metoprolol 100 mg 1 PO BID. She denies any side effects from the medications. She denies chest pain, dyspnea, exertional chest pressure/discomfort, fatigue, irregular heart beat, lower extremity edema, near-syncope, orthopnea, palpitations and syncope. Cardiovascular risk factors: advanced age (older than 54 for men, 72 for women), diabetes mellitus, dyslipidemia, hypertension, microalbuminuria, obesity (BMI >= 30 kg/m2) and sedentary lifestyle. Use of agents associated with hypertension: none. History of target organ damage: transient ischemic attack. Hyperlipidemia:   She is taking atorvastatin 80 mg 1 tablet mouth daily and denies any side effects from the medications. Last blood test performed November 5, 2021 show a total cholesterol 209, triglycerides 90,  and HDL 69.   There is a family history of hyperlipidemia. There is not a family history of early ischemia heart disease. She tries to follow low cholesterol diet, but he does not exercises. CKD II:  sees Dr. Asia Castro.  Last visit was April 15, 2021 and he continued the Losartan, nifedipine, hydralazine, Lopressor and hydrochlorothiazide. Next visit in 1 year. Microalbuminuria: she is taking Losartan 50 mg PO BID per Dr. Asia Castro, her nephrologist, since September 2018. Originaly it was 220, but it cam down to 44.1 in September 2019 and has been around there since then. Vitamin B 12 deficiency: She is taking 1000 mcg of vitamin D every day. She was diagnosed in February 19 but her level was 212. Last level done November 5, 2021 it was 873. Vitamin D deficiency:  She is taking 5000 IU daily. Last vitamin D level November 5, 2021 was 45.      has a current medication list which includes the following prescription(s): metformin, hydrochlorothiazide, atorvastatin, calcium, cyanocobalamin, dapagliflozin, metoprolol, nifedipine, cholecalciferol, olive leaf, elderberry, hydralazine, losartan, accu-chek multiclix lancets, blood glucose test strips, insulin syringe 1cc/29g, hypodermic needle 07st7-3/4\", and aspirin.     No Known Allergies    Past Medical History:   Diagnosis Date    Hyperlipidemia 2009    Hypertension 1973    TIA (transient ischemic attack) 2003    Type II or unspecified type diabetes mellitus without mention of complication, not stated as uncontrolled 2014       Review of Systems:     General ROS: negative for - chills, fatigue, fever, weight gain or weight loss  Psychological ROS: negative for - anxiety or depression  Lymphatic ROS: no swollen lymph nodes  Thyroid ROS: no enlarged thyroid  Hematologic ROS: no easy bruising  Respiratory ROS: no cough, shortness of breath, or wheezing  Cardiovascular ROS: no chest pain or dyspnea on exertion  Gastrointestinal ROS: negative for - abdominal pain, constipation, diarrhea or nausea/vomiting  Endocrine ROS: no polyuria, polydipsia, or increased apetite  Musculoskeletal ROS: negative for - muscle pain  Neurological ROS: negative for - dizziness or headaches  Skin ROS: no rashes    Physical Examination:     Blood pressure 139/70, pulse 66, height 5' 2\" (1.575 m), weight 260 lb 3.2 oz (118 kg), not currently breastfeeding. General appearance - alert, well appearing, and in no distress  Mental status - normal mood, behavior, speech, dress, motor activity, and thought processes  HEENT - NC, AT, PERRLA, EOMI, Anicteric sclerae  Ears - normal tympanic membranes bilaterally  Nose - normal turbinates, no drainage  Throat - no erythema or lesions, moist mucosas  Neck - supple, no significant adenopathy  Chest - clear to auscultation, no wheezes, rales or rhonchi, symmetric air entry  Heart - normal rate, regular rhythm, normal S1, S2, no murmurs, rubs, clicks or gallops  Abdomen - soft, nontender, nondistended, no masses or organomegaly  Neurological - alert, oriented, normal speech, no focal findings or movement disorder noted  Extremities - no pedal edema, no clubbing or cyanosis  Visual inspection:  Deformity/amputation: absent  Skin lesions/pre-ulcerative calluses: absent  Edema: right- trace, left- trace  Sensory exam:  Monofilament sensation: normal  (minimum of 5 random plantar locations tested, avoiding callused areas - > 1 area with absence of sensation is + for neuropathy)  Plus at least one of the following:  Pulses: normal,   Pinprick: Intact  Proprioception: Intact  Vibration (128 Hz): N/A      Assessment:     Diagnosis Orders   1. Type 2 diabetes mellitus with chronic kidney disease, without long-term current use of insulin, unspecified CKD stage (HCC)  Hemoglobin A1C   2. Hypercholesterolemia     3. Essential hypertension     4. Vitamin B 12 deficiency     5.  Vitamin D deficiency         Plan:  Change  Metformin ER 1000 mg to 1 tablet PO daily Continue Farxiga to 10 mg mg 1 tablet PO daily  Continue Losartan 50 mg 1 tablet PO BID  Continue HCTZ 25 mg 1 tablet PO daily as needed  Continue Hydralazine 50 mg 1 tablet PO BID  Continue Nifedipine ER 90 mg 1 tablet PO BID  Continue Metoprolol 100 mg 1 tablet PO BID. Continue baby ASA daily        Orders Placed This Encounter   Procedures    Hemoglobin A1C     Standing Status:   Future     Standing Expiration Date:   11/8/2022       Return in about 3 months (around 2/8/2022).     Cate Hodges MD

## 2021-11-10 LAB
BACTERIA: ABNORMAL
BILIRUBIN URINE: NEGATIVE
BLOOD, URINE: NEGATIVE
CASTS: ABNORMAL /LPF
CASTS: ABNORMAL /LPF
CHARACTER, URINE: CLEAR
COLOR: YELLOW
CRYSTALS: ABNORMAL
EPITHELIAL CELLS, UA: ABNORMAL /HPF
GLUCOSE, URINE: >= 1000 MG/DL
KETONES, URINE: NEGATIVE
LEUKOCYTE ESTERASE, URINE: ABNORMAL
MISCELLANEOUS LAB TEST RESULT: ABNORMAL
NITRITE, URINE: POSITIVE
PH UA: 5 (ref 5–9)
PROTEIN UA: NEGATIVE MG/DL
RBC URINE: ABNORMAL /HPF
RENAL EPITHELIAL, UA: ABNORMAL
SPECIFIC GRAVITY UA: 1.03 (ref 1–1.03)
UROBILINOGEN, URINE: 0.2 EU/DL (ref 0–1)
WBC UA: ABNORMAL /HPF
YEAST: ABNORMAL

## 2021-11-19 ENCOUNTER — TELEPHONE (OUTPATIENT)
Dept: FAMILY MEDICINE CLINIC | Age: 71
End: 2021-11-19

## 2021-11-19 RX ORDER — SULFAMETHOXAZOLE AND TRIMETHOPRIM 800; 160 MG/1; MG/1
1 TABLET ORAL 2 TIMES DAILY
Qty: 10 TABLET | Refills: 0 | Status: SHIPPED | OUTPATIENT
Start: 2021-11-19 | End: 2021-11-24

## 2021-11-19 NOTE — TELEPHONE ENCOUNTER
Ray Stewart MD   11/17/2021  4:55 PM EST Back to Top        The urine seems to be infected we will give her Bactrim DS 1 p.o. twice daily for 5 days.

## 2021-11-22 RX ORDER — DAPAGLIFLOZIN 10 MG/1
TABLET, FILM COATED ORAL
Qty: 90 TABLET | Refills: 3 | Status: SHIPPED | OUTPATIENT
Start: 2021-11-22 | End: 2022-01-13 | Stop reason: SDUPTHER

## 2021-12-05 DIAGNOSIS — N18.2 CKD (CHRONIC KIDNEY DISEASE), STAGE II: ICD-10-CM

## 2021-12-06 RX ORDER — HYDRALAZINE HYDROCHLORIDE 50 MG/1
TABLET, FILM COATED ORAL
Qty: 180 TABLET | Refills: 3 | Status: SHIPPED | OUTPATIENT
Start: 2021-12-06

## 2021-12-06 RX ORDER — NIFEDIPINE 90 MG/1
TABLET, FILM COATED, EXTENDED RELEASE ORAL
Qty: 180 TABLET | Refills: 3 | Status: SHIPPED | OUTPATIENT
Start: 2021-12-06

## 2021-12-06 RX ORDER — LOSARTAN POTASSIUM 50 MG/1
TABLET ORAL
Qty: 180 TABLET | Refills: 3 | Status: SHIPPED | OUTPATIENT
Start: 2021-12-06

## 2022-02-14 RX ORDER — METOPROLOL TARTRATE 100 MG/1
TABLET ORAL
Qty: 180 TABLET | Refills: 3 | Status: SHIPPED | OUTPATIENT
Start: 2022-02-14

## 2022-03-31 ENCOUNTER — OFFICE VISIT (OUTPATIENT)
Dept: FAMILY MEDICINE CLINIC | Age: 72
End: 2022-03-31
Payer: MEDICARE

## 2022-03-31 VITALS
HEART RATE: 79 BPM | WEIGHT: 259 LBS | RESPIRATION RATE: 14 BRPM | HEIGHT: 62 IN | DIASTOLIC BLOOD PRESSURE: 66 MMHG | BODY MASS INDEX: 47.66 KG/M2 | SYSTOLIC BLOOD PRESSURE: 126 MMHG | TEMPERATURE: 97.7 F

## 2022-03-31 DIAGNOSIS — I10 ESSENTIAL HYPERTENSION: ICD-10-CM

## 2022-03-31 DIAGNOSIS — E53.8 VITAMIN B 12 DEFICIENCY: ICD-10-CM

## 2022-03-31 DIAGNOSIS — E55.9 VITAMIN D DEFICIENCY: ICD-10-CM

## 2022-03-31 DIAGNOSIS — E78.00 HYPERCHOLESTEROLEMIA: ICD-10-CM

## 2022-03-31 DIAGNOSIS — E11.22 TYPE 2 DIABETES MELLITUS WITH CHRONIC KIDNEY DISEASE, WITHOUT LONG-TERM CURRENT USE OF INSULIN, UNSPECIFIED CKD STAGE (HCC): Primary | ICD-10-CM

## 2022-03-31 LAB — HBA1C MFR BLD: 6.4 %

## 2022-03-31 PROCEDURE — 83036 HEMOGLOBIN GLYCOSYLATED A1C: CPT | Performed by: FAMILY MEDICINE

## 2022-03-31 PROCEDURE — 99214 OFFICE O/P EST MOD 30 MIN: CPT | Performed by: FAMILY MEDICINE

## 2022-03-31 NOTE — PROGRESS NOTES
[address] [facility]  [facility] [address], [address]  Ph:   [phone]  Fax: [phone]    Provider:  Dr. Sadia Daniel    Patient:  Sathish Roman  YOB: 1950      Visit Date:  3/31/2022    Chief Complaint   Patient presents with    Follow-up    Hypertension    Hyperlipidemia    Diabetes     A1C today in office        HPI:    Sathish Roman is a 70 y.o. female who comes today to the office for follow-up of Diabetes mellitus type II, hypertension, hypercholesterolemia and microalbuminuria. Diabetes Mellitus: she is taking  Metformin 850  mg p.o. twice daily and Farxiga 10 mg PO daily, but she run out a month ago. Her blood sugars have not been doing as well. Her Hb A1c was 6.4%. Today. She denies any side effects from the medication. She is up to date on Pneumovax, Tdap and Prevnar. She does not get the influenza vaccine. She does not want to get the Covid vaccine either. Hypertension:  she is taking Losartan 50 mg 1 PO BID, HCTZ 25 mg 1 PO daily as needed (average 2 a week), Hydralazine 50 mg 1 tablet PO BID, Nifedipine ER 90 mg 1 PO BID, and Metoprolol 100 mg 1 PO BID. She denies any side effects from the medications. She denies chest pain, dyspnea, exertional chest pressure/discomfort, fatigue, irregular heart beat, lower extremity edema, near-syncope, orthopnea, palpitations and syncope. Cardiovascular risk factors: advanced age (older than 54 for men, 72 for women), diabetes mellitus, dyslipidemia, hypertension, microalbuminuria, obesity (BMI >= 30 kg/m2) and sedentary lifestyle. Use of agents associated with hypertension: none. History of target organ damage: transient ischemic attack. Hyperlipidemia:   She is taking aAorvastatin 80 mg 1 tablet mouth daily and denies any side effects from the medications. Last blood test performed November 5, 2021 show a total cholesterol 209, triglycerides 90,  and HDL 69.   There is a family history of hyperlipidemia. There is not a family history of early ischemia heart disease. She tries to follow low cholesterol diet, but he does not exercises. CKD II/Microalbuminuria: she is taking Losartan 50 mg PO BID per Dr. Alexa Neal, her nephrologist, since September 2018. Originaly it was 220, but it cam down to 44.1 in September 2019 and has been around there since then. Vitamin B 12 deficiency: She is taking 1000 mcg of vitamin D every day. She was diagnosed in February 19 but her level was 212. Last level done November 5, 2021 it was 873. Vitamin D deficiency:  She is taking 5000 IU daily. Last vitamin D level November 5, 2021 was 45.  has a current medication list which includes the following prescription(s): metoprolol, losartan, hydralazine, nifedipine, metformin, hydrochlorothiazide, atorvastatin, calcium, cyanocobalamin, cholecalciferol, olive leaf, elderberry, accu-chek multiclix lancets, blood glucose test strips, insulin syringe 1cc/29g, hypodermic needle 83qh1-9/4\", aspirin, and dapagliflozin.     No Known Allergies    Past Medical History:   Diagnosis Date    Hyperlipidemia 2009    Hypertension 1973    TIA (transient ischemic attack) 2003    Type II or unspecified type diabetes mellitus without mention of complication, not stated as uncontrolled 2014       Review of Systems:     General ROS: negative for - chills, fatigue, fever, weight gain or weight loss  Psychological ROS: negative for - anxiety or depression  Lymphatic ROS: no swollen lymph nodes  Thyroid ROS: no enlarged thyroid  Hematologic ROS: no easy bruising  Respiratory ROS: no cough, shortness of breath, or wheezing  Cardiovascular ROS: no chest pain or dyspnea on exertion  Gastrointestinal ROS: negative for - abdominal pain, constipation, diarrhea or nausea/vomiting  Endocrine ROS: no polyuria, polydipsia, or increased apetite  Musculoskeletal ROS: negative for - muscle pain  Neurological ROS: negative for - dizziness or headaches  Skin ROS: no rashes    Physical Examination:     Blood pressure 126/66, pulse 79, temperature 97.7 °F (36.5 °C), temperature source Temporal, resp. rate 14, height 5' 2\" (1.575 m), weight 259 lb (117.5 kg), not currently breastfeeding. General appearance - alert, well appearing, and in no distress  Mental status - normal mood, behavior, speech, dress, motor activity, and thought processes  HEENT - NC, AT, PERRLA, EOMI, Anicteric sclerae  Ears - normal tympanic membranes bilaterally  Nose - normal turbinates, no drainage  Throat - no erythema or lesions, moist mucosas  Neck - supple, no significant adenopathy  Chest - clear to auscultation, no wheezes, rales or rhonchi, symmetric air entry  Heart - normal rate, regular rhythm, normal S1, S2, no murmurs, rubs, clicks or gallops  Abdomen - soft, nontender, nondistended, no masses or organomegaly  Neurological - alert, oriented, normal speech, no focal findings or movement disorder noted  Extremities - no pedal edema, no clubbing or cyanosis    Assessment:     Diagnosis Orders   1. Type 2 diabetes mellitus with chronic kidney disease, without long-term current use of insulin, unspecified CKD stage ([facility] [address]     2. Hypercholesterolemia     3. Essential hypertension     4. Vitamin B 12 deficiency     5. Vitamin D deficiency         Plan:  Continue  Metformin  mg to 1 tablet PO BID   Continue Farxiga  10 mg mg 1 tablet PO daily  Continue Losartan 50 mg 1 tablet PO BID  Continue HCTZ 25 mg 1 tablet PO daily as needed  Continue Hydralazine 50 mg 1 tablet PO BID  Continue Nifedipine ER 90 mg 1 tablet PO BID  Continue Metoprolol 100 mg 1 tablet PO BID. Continue baby ASA daily        No orders of the defined types were placed in this encounter. Return in about 6 months (around 9/30/2022).     Davi Alexis MD

## 2022-03-31 NOTE — PROGRESS NOTES
Farxiga Samples given to the patient. Farxiga 10 mg  Lot [id number]  Exp 7/31/24  4 boxes given to the patient as 1 month supply.

## 2022-04-19 ENCOUNTER — NURSE ONLY (OUTPATIENT)
Dept: LAB | Age: 72
End: 2022-04-19

## 2022-04-19 DIAGNOSIS — E11.22 TYPE 2 DIABETES MELLITUS WITH CHRONIC KIDNEY DISEASE, WITHOUT LONG-TERM CURRENT USE OF INSULIN, UNSPECIFIED CKD STAGE (HCC): ICD-10-CM

## 2022-04-19 DIAGNOSIS — I10 ESSENTIAL HYPERTENSION: ICD-10-CM

## 2022-04-19 LAB
ANION GAP SERPL CALCULATED.3IONS-SCNC: 13 MEQ/L (ref 8–16)
BACTERIA: ABNORMAL
BILIRUBIN URINE: NEGATIVE
BLOOD, URINE: NEGATIVE
BUN BLDV-MCNC: 16 MG/DL (ref 7–22)
CALCIUM SERPL-MCNC: 9.7 MG/DL (ref 8.5–10.5)
CASTS: ABNORMAL /LPF
CASTS: ABNORMAL /LPF
CHARACTER, URINE: CLEAR
CHLORIDE BLD-SCNC: 104 MEQ/L (ref 98–111)
CO2: 23 MEQ/L (ref 23–33)
COLOR: YELLOW
CREAT SERPL-MCNC: 0.8 MG/DL (ref 0.4–1.2)
CREATININE URINE: 62.6 MG/DL
CREATININE, URINE: 62.2 MG/DL
CRYSTALS: ABNORMAL
EPITHELIAL CELLS, UA: ABNORMAL /HPF
GLUCOSE BLD-MCNC: 113 MG/DL (ref 70–108)
GLUCOSE, URINE: >= 1000 MG/DL
KETONES, URINE: NEGATIVE
LEUKOCYTE ESTERASE, URINE: ABNORMAL
MICROALBUMIN UR-MCNC: 1.31 MG/DL
MICROALBUMIN/CREAT UR-RTO: 21 MG/G (ref 0–30)
MISCELLANEOUS LAB TEST RESULT: ABNORMAL
NITRITE, URINE: POSITIVE
PH UA: 5 (ref 5–9)
POTASSIUM SERPL-SCNC: 4 MEQ/L (ref 3.5–5.2)
PROT/CREAT RATIO, UR: 0.27
PROTEIN UA: NEGATIVE MG/DL
PROTEIN, URINE: 16.8 MG/DL
RBC URINE: ABNORMAL /HPF
RENAL EPITHELIAL, UA: ABNORMAL
SODIUM BLD-SCNC: 140 MEQ/L (ref 135–145)
SPECIFIC GRAVITY UA: 1.02 (ref 1–1.03)
UROBILINOGEN, URINE: 0.2 EU/DL (ref 0–1)
WBC UA: ABNORMAL /HPF
YEAST: ABNORMAL

## 2022-04-20 ENCOUNTER — TELEPHONE (OUTPATIENT)
Dept: FAMILY MEDICINE CLINIC | Age: 72
End: 2022-04-20

## 2022-04-20 ENCOUNTER — TELEPHONE (OUTPATIENT)
Dept: NEPHROLOGY | Age: 72
End: 2022-04-20

## 2022-04-20 NOTE — TELEPHONE ENCOUNTER
Becky Steward MD   4/20/2022  1:43 PM EDT Back to Top        Urine seems infected.  Is she having any symptoms like frequency, new onset incontinence, fever, lower pelvic pain, dysuria, etc?

## 2022-04-20 NOTE — TELEPHONE ENCOUNTER
----- Message from Shila Talbot MD sent at 4/19/2022  6:32 PM EDT -----  Does she have any UTI symptoms?  If so then pls order urine culture

## 2022-04-21 ENCOUNTER — OFFICE VISIT (OUTPATIENT)
Dept: NEPHROLOGY | Age: 72
End: 2022-04-21
Payer: MEDICARE

## 2022-04-21 VITALS
DIASTOLIC BLOOD PRESSURE: 82 MMHG | WEIGHT: 256 LBS | OXYGEN SATURATION: 94 % | SYSTOLIC BLOOD PRESSURE: 133 MMHG | BODY MASS INDEX: 46.82 KG/M2 | TEMPERATURE: 97.2 F | HEART RATE: 70 BPM

## 2022-04-21 DIAGNOSIS — I10 PRIMARY HYPERTENSION: Primary | ICD-10-CM

## 2022-04-21 DIAGNOSIS — R80.9 ALBUMINURIA: ICD-10-CM

## 2022-04-21 LAB — GFR SERPL CREATININE-BSD FRML MDRD: 71 ML/MIN/1.73M2

## 2022-04-21 PROCEDURE — 99213 OFFICE O/P EST LOW 20 MIN: CPT | Performed by: INTERNAL MEDICINE

## 2022-04-21 NOTE — TELEPHONE ENCOUNTER
Spoke with patient. She denies any UTI symptoms at this time. Advised patient to push fluids to help flush her system. She will contact the office with any changes. No questions at this time.

## 2022-06-23 ENCOUNTER — TELEPHONE (OUTPATIENT)
Dept: FAMILY MEDICINE CLINIC | Age: 72
End: 2022-06-23

## 2022-07-28 RX ORDER — ATORVASTATIN CALCIUM 80 MG/1
TABLET, FILM COATED ORAL
Qty: 90 TABLET | Refills: 3 | Status: SHIPPED | OUTPATIENT
Start: 2022-07-28

## 2022-09-27 ENCOUNTER — NURSE ONLY (OUTPATIENT)
Dept: LAB | Age: 72
End: 2022-09-27

## 2022-09-27 DIAGNOSIS — E55.9 VITAMIN D DEFICIENCY: ICD-10-CM

## 2022-09-27 DIAGNOSIS — E53.8 VITAMIN B 12 DEFICIENCY: ICD-10-CM

## 2022-09-27 DIAGNOSIS — E78.00 HYPERCHOLESTEROLEMIA: ICD-10-CM

## 2022-09-27 DIAGNOSIS — I10 ESSENTIAL HYPERTENSION: ICD-10-CM

## 2022-09-27 LAB
ALBUMIN SERPL-MCNC: 3.6 G/DL (ref 3.5–5.1)
ALP BLD-CCNC: 106 U/L (ref 38–126)
ALT SERPL-CCNC: 9 U/L (ref 11–66)
ANION GAP SERPL CALCULATED.3IONS-SCNC: 12 MEQ/L (ref 8–16)
AST SERPL-CCNC: 13 U/L (ref 5–40)
BASOPHILS # BLD: 0.4 %
BASOPHILS ABSOLUTE: 0 THOU/MM3 (ref 0–0.1)
BILIRUB SERPL-MCNC: 0.3 MG/DL (ref 0.3–1.2)
BUN BLDV-MCNC: 14 MG/DL (ref 7–22)
CALCIUM SERPL-MCNC: 10.4 MG/DL (ref 8.5–10.5)
CHLORIDE BLD-SCNC: 104 MEQ/L (ref 98–111)
CHOLESTEROL, TOTAL: 212 MG/DL (ref 100–199)
CO2: 24 MEQ/L (ref 23–33)
CREAT SERPL-MCNC: 0.7 MG/DL (ref 0.4–1.2)
EOSINOPHIL # BLD: 0.9 %
EOSINOPHILS ABSOLUTE: 0.1 THOU/MM3 (ref 0–0.4)
ERYTHROCYTE [DISTWIDTH] IN BLOOD BY AUTOMATED COUNT: 15.9 % (ref 11.5–14.5)
ERYTHROCYTE [DISTWIDTH] IN BLOOD BY AUTOMATED COUNT: 56.1 FL (ref 35–45)
GLUCOSE BLD-MCNC: 118 MG/DL (ref 70–108)
HCT VFR BLD CALC: 45 % (ref 37–47)
HDLC SERPL-MCNC: 65 MG/DL
HEMOGLOBIN: 14.4 GM/DL (ref 12–16)
IMMATURE GRANS (ABS): 0.02 THOU/MM3 (ref 0–0.07)
IMMATURE GRANULOCYTES: 0.3 %
LDL CHOLESTEROL CALCULATED: 129 MG/DL
LYMPHOCYTES # BLD: 19.4 %
LYMPHOCYTES ABSOLUTE: 1.5 THOU/MM3 (ref 1–4.8)
MCH RBC QN AUTO: 30.8 PG (ref 26–33)
MCHC RBC AUTO-ENTMCNC: 32 GM/DL (ref 32.2–35.5)
MCV RBC AUTO: 96.2 FL (ref 81–99)
MONOCYTES # BLD: 8 %
MONOCYTES ABSOLUTE: 0.6 THOU/MM3 (ref 0.4–1.3)
NUCLEATED RED BLOOD CELLS: 0 /100 WBC
PLATELET # BLD: 360 THOU/MM3 (ref 130–400)
PMV BLD AUTO: 10.2 FL (ref 9.4–12.4)
POTASSIUM SERPL-SCNC: 4.2 MEQ/L (ref 3.5–5.2)
RBC # BLD: 4.68 MILL/MM3 (ref 4.2–5.4)
SEG NEUTROPHILS: 71 %
SEGMENTED NEUTROPHILS ABSOLUTE COUNT: 5.4 THOU/MM3 (ref 1.8–7.7)
SODIUM BLD-SCNC: 140 MEQ/L (ref 135–145)
TOTAL PROTEIN: 7.8 G/DL (ref 6.1–8)
TRIGL SERPL-MCNC: 91 MG/DL (ref 0–199)
TSH SERPL DL<=0.05 MIU/L-ACNC: 2.31 UIU/ML (ref 0.4–4.2)
VITAMIN B-12: 650 PG/ML (ref 211–911)
VITAMIN D 25-HYDROXY: 49 NG/ML (ref 30–100)
WBC # BLD: 7.6 THOU/MM3 (ref 4.8–10.8)

## 2022-09-30 ENCOUNTER — OFFICE VISIT (OUTPATIENT)
Dept: FAMILY MEDICINE CLINIC | Age: 72
End: 2022-09-30
Payer: MEDICARE

## 2022-09-30 ENCOUNTER — HOSPITAL ENCOUNTER (OUTPATIENT)
Age: 72
Discharge: HOME | End: 2022-09-30
Payer: MEDICARE

## 2022-09-30 VITALS
TEMPERATURE: 97.9 F | BODY MASS INDEX: 47.48 KG/M2 | RESPIRATION RATE: 12 BRPM | WEIGHT: 258 LBS | HEART RATE: 66 BPM | SYSTOLIC BLOOD PRESSURE: 106 MMHG | OXYGEN SATURATION: 100 % | HEIGHT: 62 IN | DIASTOLIC BLOOD PRESSURE: 53 MMHG

## 2022-09-30 DIAGNOSIS — E11.22 TYPE 2 DIABETES MELLITUS WITH STAGE 2 CHRONIC KIDNEY DISEASE, WITHOUT LONG-TERM CURRENT USE OF INSULIN (HCC): Primary | ICD-10-CM

## 2022-09-30 DIAGNOSIS — N18.2 TYPE 2 DIABETES MELLITUS WITH STAGE 2 CHRONIC KIDNEY DISEASE, WITHOUT LONG-TERM CURRENT USE OF INSULIN (HCC): Primary | ICD-10-CM

## 2022-09-30 DIAGNOSIS — R07.89 ATYPICAL CHEST PAIN: ICD-10-CM

## 2022-09-30 DIAGNOSIS — M54.16 LUMBAR BACK PAIN WITH RADICULOPATHY AFFECTING LOWER EXTREMITY: ICD-10-CM

## 2022-09-30 LAB — HBA1C MFR BLD: 6.6 %

## 2022-09-30 PROCEDURE — 93005 ELECTROCARDIOGRAM TRACING: CPT | Performed by: NURSE PRACTITIONER

## 2022-09-30 PROCEDURE — 3044F HG A1C LEVEL LT 7.0%: CPT | Performed by: NURSE PRACTITIONER

## 2022-09-30 PROCEDURE — 83036 HEMOGLOBIN GLYCOSYLATED A1C: CPT | Performed by: NURSE PRACTITIONER

## 2022-09-30 PROCEDURE — 99214 OFFICE O/P EST MOD 30 MIN: CPT | Performed by: NURSE PRACTITIONER

## 2022-09-30 PROCEDURE — 1123F ACP DISCUSS/DSCN MKR DOCD: CPT | Performed by: NURSE PRACTITIONER

## 2022-09-30 SDOH — ECONOMIC STABILITY: FOOD INSECURITY: WITHIN THE PAST 12 MONTHS, THE FOOD YOU BOUGHT JUST DIDN'T LAST AND YOU DIDN'T HAVE MONEY TO GET MORE.: NEVER TRUE

## 2022-09-30 SDOH — ECONOMIC STABILITY: FOOD INSECURITY: WITHIN THE PAST 12 MONTHS, YOU WORRIED THAT YOUR FOOD WOULD RUN OUT BEFORE YOU GOT MONEY TO BUY MORE.: NEVER TRUE

## 2022-09-30 ASSESSMENT — SOCIAL DETERMINANTS OF HEALTH (SDOH): HOW HARD IS IT FOR YOU TO PAY FOR THE VERY BASICS LIKE FOOD, HOUSING, MEDICAL CARE, AND HEATING?: NOT HARD AT ALL

## 2022-09-30 NOTE — PATIENT INSTRUCTIONS
You may receive a survey about your visit with us today. The feedback from our patients helps us identify what is working well and where the service to all patients can be enhanced. Thank you! EKG to compare previous  Physical therapy for low back pain, they will call to schedule. Continue medications.
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
## 2022-10-01 LAB
EKG ATRIAL RATE: 74 BPM
EKG P AXIS: 50 DEGREES
EKG P-R INTERVAL: 170 MS
EKG Q-T INTERVAL: 384 MS
EKG QRS DURATION: 84 MS
EKG QTC CALCULATION (BAZETT): 426 MS
EKG R AXIS: 22 DEGREES
EKG T AXIS: 2 DEGREES
EKG VENTRICULAR RATE: 74 BPM

## 2022-10-01 PROCEDURE — 93010 ELECTROCARDIOGRAM REPORT: CPT | Performed by: NUCLEAR MEDICINE

## 2022-10-02 ASSESSMENT — ENCOUNTER SYMPTOMS: BACK PAIN: 1

## 2022-10-04 ENCOUNTER — TELEPHONE (OUTPATIENT)
Dept: FAMILY MEDICINE CLINIC | Age: 72
End: 2022-10-04

## 2022-10-04 DIAGNOSIS — R94.31 ABNORMAL EKG: Primary | ICD-10-CM

## 2022-10-04 NOTE — TELEPHONE ENCOUNTER
----- Message from TIA Sullivan - CNP sent at 10/2/2022 10:04 PM EDT -----  EKG showed normal sinus rhythm, with some nonspecific T wave abnormalities that were not there on her last EKG 4 years ago. Please refer to cardiology diagnosis atypical chest pain and abnormal EKG.

## 2022-10-11 ENCOUNTER — OFFICE VISIT (OUTPATIENT)
Dept: CARDIOLOGY CLINIC | Age: 72
End: 2022-10-11
Payer: MEDICARE

## 2022-10-11 VITALS
HEIGHT: 62 IN | DIASTOLIC BLOOD PRESSURE: 63 MMHG | BODY MASS INDEX: 47.89 KG/M2 | HEART RATE: 84 BPM | SYSTOLIC BLOOD PRESSURE: 105 MMHG | WEIGHT: 260.25 LBS

## 2022-10-11 DIAGNOSIS — R94.31 ABNORMAL EKG: ICD-10-CM

## 2022-10-11 DIAGNOSIS — R06.00 DYSPNEA, UNSPECIFIED TYPE: Primary | ICD-10-CM

## 2022-10-11 PROCEDURE — 99204 OFFICE O/P NEW MOD 45 MIN: CPT | Performed by: INTERNAL MEDICINE

## 2022-10-11 PROCEDURE — 1123F ACP DISCUSS/DSCN MKR DOCD: CPT | Performed by: INTERNAL MEDICINE

## 2022-10-11 NOTE — PROGRESS NOTES
[address] [facility] [address]  Dept: [phone]  Dept Fax: [phone]  Loc: [phone]    Visit Date: 10/11/2022    Ms. Mark Anthony Khan is a 70 y.o. female  who presented for:  Chief Complaint   Patient presents with    New Patient     Abnormal ekg    Shortness of Breath     Abnormal EKG       HPI:   71 yo F DM, HTN, HLD, TIA is referred for abnormal EKG. Patient also reports some vague chest discomfort. The chest pains was more on the right side, soreness, maybe worsened with deep breathing, associated with shortness of breath. Had [address]  one month ago. EKG shows SR, NSST. HbA1c\" 6.6. She is on Aspirin, statin, metoprolol, losartan, hctz, nifedipine.             Current Outpatient Medications:     atorvastatin (LIPITOR) 80 MG tablet, TAKE 1 TABLET DAILY, Disp: 90 tablet, Rfl: 3    dapagliflozin (FARXIGA) 10 MG tablet, TAKE 1 TABLET EVERY MORNING, Disp: 90 tablet, Rfl: 1    metFORMIN (GLUCOPHAGE) 850 MG tablet, Take 1 tablet by mouth 2 times daily (with meals), Disp: 180 tablet, Rfl: 1    metoprolol (LOPRESSOR) 100 MG tablet, TAKE 1 TABLET TWICE A DAY, Disp: 180 tablet, Rfl: 3    losartan (COZAAR) 50 MG tablet, TAKE 1 TABLET TWICE A DAY, Disp: 180 tablet, Rfl: 3    hydrALAZINE (APRESOLINE) 50 MG tablet, TAKE 1 TABLET TWICE A DAY, Disp: 180 tablet, Rfl: 3    NIFEdipine (ADALAT CC) 90 MG extended release tablet, TAKE 1 TABLET TWICE A DAY, Disp: 180 tablet, Rfl: 3    hydroCHLOROthiazide (HYDRODIURIL) 25 MG tablet, TAKE 1 TABLET DAILY, Disp: 90 tablet, Rfl: 3    Cyanocobalamin (B-12 PO), Take by mouth, Disp: , Rfl:     Cholecalciferol (VITAMIN D3 PO), Take by mouth, Disp: , Rfl:     OLIVE LEAF EXTRACT PO, Take by mouth, Disp: , Rfl:     ELDERBERRY PO, Take by mouth, Disp: , Rfl:     aspirin 81 MG tablet, Take 81 mg by mouth daily, Disp: , Rfl:     Accu-Chek Multiclix Lancets MISC, 1 Units by Does not apply route 4 times daily, Disp: 204 each, Rfl: 1    blood glucose test strips (ACCU-CHEK ACTIVE STRIPS) strip, 1 each by In Vitro route 4 times daily As needed. , Disp: 200 each, Rfl: 3    INSULIN SYRINGE 1CC/29G 29G X 1/2\" 1 ML MISC, 1 each by Does not apply route daily, Disp: 30 each, Rfl: 0    Needle, Disp, (HYPODERMIC NEEDLE 36RW5-1/4\") 27G X 1-1/4\" MISC, 1 Units by Does not apply route daily, Disp: 30 each, Rfl: 5    Past Medical History  Najma Russell  has a past medical history of Hyperlipidemia, Hypertension, TIA (transient ischemic attack), and Type II or unspecified type diabetes mellitus without mention of complication, not stated as uncontrolled. Social History  Najma Russell  reports that she has never smoked. She has never used smokeless tobacco. She reports that she does not drink alcohol and does not use drugs. Family History  Najma Russell family history includes Breast Cancer (age of onset: 54) in her paternal cousin; Breast Cancer (age of onset: 66) in her paternal cousin; Cancer in her father; Diabetes in her sister, sister, sister, and sister; Heart Disease in her brother, sister, sister, and sister; High Blood Pressure in her mother, sister, and sister; Kidney Disease in her mother; No Known Problems in her sister and sister. Past Surgical History   Past Surgical History:   Procedure Laterality Date    TOTAL KNEE ARTHROPLASTY Bilateral 11/13/2017    [address] [facility] DR Jodi [facility]       Subjective:     REVIEW OF SYSTEMS  Constitutional: denies sweats, chills and fever  HENT: denies  congestion, sinus pressure, sneezing and sore throat. Eyes: denies  pain, discharge, redness and itching. Respiratory: denies apnea, cough  Gastrointestinal: denies blood in stool, constipation, diarrhea   Endocrine: denies cold intolerance, heat intolerance, polydipsia. Genitourinary: denies dysuria, enuresis, flank pain and hematuria. Musculoskeletal: denies arthralgias, joint swelling and neck pain.    Neurological: CREATININE 0.7 09/27/2022 01:14 PM    CALCIUM 10.4 09/27/2022 01:14 PM    LABGLOM 82 09/27/2022 01:14 PM    GLUCOSE 118 09/27/2022 01:14 PM    GLUCOSE 147 09/18/2018 04:07 PM       Lab Results   Component Value Date/Time    ALKPHOS 106 09/27/2022 01:14 PM    ALT 9 09/27/2022 01:14 PM    AST 13 09/27/2022 01:14 PM    PROT 7.8 09/27/2022 01:14 PM    BILITOT 0.3 09/27/2022 01:14 PM    BILIDIR <0.2 02/18/2020 04:49 PM    LABALBU 3.6 09/27/2022 01:14 PM       No results found for: MG    No results found for: INR, PROTIME      Lab Results   Component Value Date/Time    LABA1C 6.6 09/30/2022 12:24 PM       Lab Results   Component Value Date/Time    TRIG 91 09/27/2022 01:14 PM    HDL 65 09/27/2022 01:14 PM    LDLCALC 129 09/27/2022 01:14 PM    LABVLDL 21 11/05/2018 11:48 AM       Lab Results   Component Value Date/Time    TSH 2.310 09/27/2022 01:14 PM         Testing Reviewed:      I haveindividually reviewed the below cardiac tests    EKG:    ECHO: No results found for this or any previous visit. STRESS:    CATH:    Assessment/Plan       Diagnosis Orders   1. Dyspnea, unspecified type          Shortness of breath  Atypical chest pain  Mildly abnormal stress test  Long standing DM  HTN  HLD  Obesity    Will get Lexiscan stress test to evaluate for inducible ischemia  Will get Echo to evaluate LVSF/Valves  Will get 2 week event monitor to evaluate for possible Afib  Had COVID19 infection 1 month  The patient is asked to make an attempt to improve diet and exercise patterns to aid in medical management of this problem. Advised more plant based nutrition/meditarrean diet   Advised patient to call office or seek immediate medical attention if there is any new onset of  any chest pain, sob, palpitations, lightheadedness, dizziness, orthopnea, PND or pedal edema. All medication side effects were discussed in details. Thank youfor allowing me to participate in the care of this patient.    Please do not hesitate to contact me for any further questions. Return in about 6 weeks (around 11/22/2022), or if symptoms worsen or fail to improve, for Regular follow up, Review testing.        Electronically signed by Janet Coburn MD [facility]  10/11/2022 at 10:52 AM EDT

## 2022-10-11 NOTE — PROGRESS NOTES
New patient here for check up -- abnormal EKG    Pt c/o sharp pain when breathing, pain lasted over night until next afternoon, sob on exertion at times, swelling in legs and feet    Pt denies dizziness

## 2022-10-13 RX ORDER — HYDROCHLOROTHIAZIDE 25 MG/1
TABLET ORAL
Qty: 90 TABLET | Refills: 3 | Status: SHIPPED | OUTPATIENT
Start: 2022-10-13

## 2022-10-17 ENCOUNTER — HOSPITAL ENCOUNTER (OUTPATIENT)
Dept: NON INVASIVE DIAGNOSTICS | Age: 72
Discharge: HOME | End: 2022-10-17
Payer: MEDICARE

## 2022-10-17 VITALS — BODY MASS INDEX: 47.84 KG/M2 | HEIGHT: 62 IN | WEIGHT: 260 LBS

## 2022-10-17 DIAGNOSIS — R94.31 ABNORMAL EKG: ICD-10-CM

## 2022-10-17 DIAGNOSIS — R06.00 DYSPNEA, UNSPECIFIED TYPE: ICD-10-CM

## 2022-10-17 LAB — GFR SERPL CREATININE-BSD FRML MDRD: > 90 ML/MIN/1.73M2

## 2022-10-17 PROCEDURE — 3430000000 HC RX DIAGNOSTIC RADIOPHARMACEUTICAL: Performed by: INTERNAL MEDICINE

## 2022-10-17 PROCEDURE — 78452 HT MUSCLE IMAGE SPECT MULT: CPT

## 2022-10-17 PROCEDURE — 93270 REMOTE 30 DAY ECG REV/REPORT: CPT

## 2022-10-17 PROCEDURE — A9500 TC99M SESTAMIBI: HCPCS | Performed by: INTERNAL MEDICINE

## 2022-10-17 PROCEDURE — 93306 TTE W/DOPPLER COMPLETE: CPT

## 2022-10-17 PROCEDURE — 93017 CV STRESS TEST TRACING ONLY: CPT | Performed by: INTERNAL MEDICINE

## 2022-10-17 PROCEDURE — 6360000002 HC RX W HCPCS

## 2022-10-17 RX ADMIN — Medication 9.7 MILLICURIE: at 13:45

## 2022-10-17 RX ADMIN — Medication 30.8 MILLICURIE: at 15:00

## 2022-10-17 NOTE — PROCEDURES
14 Day Cardiac Event Monitor was applied to patient. Instructions were given and skin/monitor prep and application was demonstrated. Patient was instructed to remove monitor on 1623 and mail back to [employer].

## 2022-10-18 ENCOUNTER — HOSPITAL ENCOUNTER (OUTPATIENT)
Dept: PHYSICAL THERAPY | Age: 72
Setting detail: THERAPIES SERIES
Discharge: HOME | End: 2022-10-18
Payer: MEDICARE

## 2022-10-18 PROCEDURE — 97530 THERAPEUTIC ACTIVITIES: CPT

## 2022-10-18 PROCEDURE — 97110 THERAPEUTIC EXERCISES: CPT

## 2022-10-18 PROCEDURE — 97162 PT EVAL MOD COMPLEX 30 MIN: CPT

## 2022-10-18 NOTE — PROGRESS NOTES
** PLEASE SIGN, DATE AND TIME CERTIFICATION BELOW AND RETURN TO [facility] OUTPATIENT REHABILITATION (FAX #: [phone]). ATTEST/CO-SIGN IF ACCESSING VIA [employer]. THANK YOU.**    I certify that I have examined the patient below and determined that Physical Medicine and Rehabilitation service is necessary and that I approve the established plan of care for up to 90 days or as specifically noted. Attestation, signature or co-signature of physician indicates approval of certification requirements.    ________________________ ____________ __________  Physician Signature   Date   Time  [address] [facility]  PHYSICAL THERAPY  [x] EVALUATION  [] DAILY NOTE (LAND) [] DAILY NOTE (AQUATIC ) [] PROGRESS NOTE [] DISCHARGE NOTE    [x] [address] [facility]   [] [place] [address]    [] [facility]   [] [facility]    Date: 10/18/2022  Patient Name:  Gregory Cash  : 1950  MRN: [id number]  CSN: [id number]    Referring Practitioner Guevara Bourgeois APR*   Diagnosis Lumbar back pain with radiculopathy affecting lower extremity [M54.16]    Treatment Diagnosis core and LE weakness, hip stiffness, impaired posture and impaired balance   Date of Evaluation 10/18/22    Additional Pertinent History HTN, DM , B TKA      Functional Outcome Measure Used Modified Oswestry   Functional Outcome Score 20/50 (10/18/22)       Insurance: Primary: Payor: Carmen Manzanares /  /  / ,   Secondary:    Authorization Information: No Pre-cert required   Visit # , 1/10 for progress note   Visits Allowed: UNLIMITED VISITS BASED ON MEDICAL NECESSITY   Recertification Date: 2022   Physician Follow-Up: Nov 3, 2022   Physician Orders: Omar Banuelos and treat   History of Present Illness: Pt is a 71 y/o female presenting to skilled PT services with c/o low back pain. Pt reports experiencing back pain for years of insidious onset.  Reports radicular pain into B LE, especially hips, within the last year. Pain is not constant but comes and goes. Pain increases with standing activity and walking, decreases with sitting. Denies n/t. No imaging completed. Takes motrin when leaving the home which does provide a little relief. SUBJECTIVE: Pt denies pain today. Social/Functional History and Current Status:  Medications and Allergies have been reviewed and are listed on Medical History Questionnaire. Annelise Garrett lives alone in a single story home with a level surface to enter.     Task Previous Current   ADLs  Independent Modified Independent   IADL's Independent Modified Independent   Ambulation Independent Modified Independent   Transfers Independent Modified Independent   Recreation Independent Dependent/Unable, enjoy walking for exercise, but is no longer due to back pain   Community Integration Independent Dependent/Unable, singing at iwi increases pain   Driving Active  Active    Work Retired ,  Retired     Objective:    OBSERVATION   Pain 0/10   Palpation No TTP lumbar spine, hips   Sensation B LE light touch sensation intact   Bed Mobility Mod I, increased time to complete   Transfers Mod I    Ambulation No AD and no pain today, R foot supination, slight genu valgus B, slightly reduced B heel strike, although pt in crocs, good gait speed and step length, no antalgia  Pt reports when she is pain she is slow and gaurded   Stairs 1 HR, reciprocal pattern ascent, mix of reciprocal and step-to pattern descent   Balance R 3 sec, L 7 sec       POSTURE    No Deficit Deficit Comments   Forward Head  x    Rounded Shoulders  x    Kyphosis      Lordosis  x Increased lumbar   Lateral Shift      Scoliosis x     Iliac Crest x     PSIS x     ASIS x     Leg Length Discrp      Slumped Sitting          TRUNK RANGE OF MOTION   Flexion:    Extension:    Lateral Flexion Left: + pain   Lateral Flexion Right: + pain   Rotation Left: + pain   Rotation Right: + pain   Trunk Range of Motion is [facility]  [x]     LOWER EXTREMITY RANGE OF MOTION    Left Right Comments   Hip Flexion knee to chest      Hip Extension      Hip ABDuction      Hip ADDuction      Hip Internal Rotation      Hip External Rotation      Hip Range of Motion is [facility]  [x]      Knee Flexion      Knee Extension      Knee Range of Motion is [facility]  [x]       LOWER EXTREMITY STRENGTH    Left Right Comments   Hip Flexion 4 4    Hip Abduction 4 4    Hip Adduction      Hip Extension 4 4    Hip External Rotation      Knee Flexion 4+ 4+    Knee Extension 4+ 4+    Ankle DF 5 5    Ankle PF      LE strength is WFL []      CORE STRENGTH   B SLR TEST:    4      seconds       SPECIAL TESTS (+/-)    Left Right Comments   SLR  - -    90/90 Hamstring length 80 84    SKTC - -    DKTC      Slump      SI Compression/Distraction      SEBASTIAN - -    RUTH - -    Mann Pichardo          TREATMENT   Precautions:    Pain: 0/10 LBP    \"X in shaded column indicates activity completed today    *\" next to exercise/intervention indicates progression   Modalities Parameters/  Location  Notes                     Manual Therapy Time/Technique  Notes                     Exercise/Intervention   Notes   NuStep              TA contraction 10x 5 sec x    TA + march 10x  x           SKTC 3x 30 sec x    Piriformis stretch   x Attempted, however cramping in HS   LTR 5x 15 sec x                           Specific Interventions Next Treatment: nustep warm up, core strengthening, hip strengthening, hip stretching and lumbar stretching    Activity/Treatment Tolerance:  [x]  Patient tolerated treatment well  []  Patient limited by fatigue  []  Patient limited by pain   []  Patient limited by medical complications  []  Other:     Assessment:  Pt is a 71 y/o female presenting to skilled PT services with c/o low back pain with radiculopathy.  Pt demonstrates core and LE weakness, hip stiffness, impaired posture and balance, limiting her ability to perform ADLs and household tasks. Pt will benefit from skilled PT services to increase strength, ROM, tissue extensibility, posture and reduce pain to improve ability to perform functional mobility tasks for return to PLOF. Body Structures/Functions/Activity Limitations: impaired activity tolerance, impaired balance, impaired ROM, impaired strength, pain, and abnormal posture  Prognosis: good      GOALS:  Patient Goal: reduce pain, return to walking    Short Term Goals:  Time Frame: 3 weeks    1. Patient will report decrease in back and hip pain to 3/10 at most to promote ease of household tasks. 2. Patient will improve B hip strength to 4+/5 to improve ability to ambulate with a normalized gait pattern. 3. Patient will perform B SLR for 10 seconds to promote increased core strength needed for ease of standing tasks. 4. Patient will be indep with HEP in order to meet long term goals. Long Term Goals:  Time Frame: 6 wks    1. Patient will improve Modified Oswestry score from 20/50 to 12/50 to allow decrease in disability and improved functional mobility for improved overall QOL. 2. Patient will be indep with HEP in order to prevent re-injury and improve ability to perform functional mobility tasks. Patient Education:   [x]  HEP/Education Completed: Plan of Care, Goals, nature of condition, pt inquired about aquatic therapy so discussed benefits and purpose of aquatic therapy, pool tour given, aquatic guidelines HO provided, Pt has heart monitor until 10/31, will begin pool therapy on 11/1 once monitor is removed. HO for HEP provided  [place] [facility] Access Code: [email or web address]  []  No new Education completed  []  Reviewed Prior HEP      [x]  Patient verbalized and/or demonstrated understanding of education provided. []  Patient unable to verbalize and/or demonstrate understanding of education provided. Will continue education.   []  Barriers to learning:     PLAN:  Treatment Recommendations: Strengthening, Range of Motion, Balance Training, Manual Therapy - Soft Tissue Mobilization, Manual Therapy - Joint Manipulation, Pain Management, Home Exercise Program, Patient Education, Safety Education and Training, Aquatics, and Modalities    [x]  Plan of care initiated. Plan to see patient 2 times per week for 6 weeks to address the treatment planned outlined above.  Will begin on land and transition to pool when heart monitor is removed  []  Continue with current plan of care  []  Modify plan of care as follows:    []  Hold pending physician visit  []  Discharge    Time In 0922   Time Out 1027   Timed Code Minutes: 30 min   Total Treatment Time: 65 min     Electronically Signed by: Bienvenido Panchal, [place] [id number]

## 2022-10-24 ENCOUNTER — HOSPITAL ENCOUNTER (OUTPATIENT)
Dept: PHYSICAL THERAPY | Age: 72
Setting detail: THERAPIES SERIES
Discharge: HOME | End: 2022-10-24
Payer: MEDICARE

## 2022-10-24 PROCEDURE — 97110 THERAPEUTIC EXERCISES: CPT

## 2022-10-24 NOTE — PROGRESS NOTES
[address] [facility]  PHYSICAL THERAPY  [] EVALUATION  [x] DAILY NOTE (LAND) [] DAILY NOTE (AQUATIC ) [] PROGRESS NOTE [] DISCHARGE NOTE    [x] OUTPATIENT REHABILITATION [facility]   [] [place] [address]    [] [facility]   [] Gisselle De Souzaon    Date: 10/24/2022  Patient Name:  Linsey Ta  : 1950  MRN: [id number]  CSN: [id number]    Referring Practitioner Piedad Morataya, APR*   Diagnosis Lumbar back pain with radiculopathy affecting lower extremity [M54.16]    Treatment Diagnosis core and LE weakness, hip stiffness, impaired posture and impaired balance   Date of Evaluation 10/18/22    Additional Pertinent History HTN, DM , B TKA      Functional Outcome Measure Used Modified Oswestry   Functional Outcome Score  (10/18/22)       Insurance: Primary: Payor: Nikhil Swanson /  /  / ,   Secondary:    Authorization Information: No Pre-cert required   Visit # 2, 2/10 for progress note   Visits Allowed: UNLIMITED VISITS BASED ON MEDICAL NECESSITY   Recertification Date: 2022   Physician Follow-Up: Nov 3, 2022   Physician Orders: Candi Burroughs and ladan   History of Present Illness: Pt is a 69 y/o female presenting to skilled PT services with c/o low back pain. Pt reports experiencing back pain for years of insidious onset. Reports radicular pain into B LE, especially hips, within the last year. Pain is not constant but comes and goes. Pain increases with standing activity and walking, decreases with sitting. Denies n/t. No imaging completed. Takes motrin when leaving the home which does provide a little relief. SUBJECTIVE:  Patient states she was sore after first visit. Rates current pain 5/10 across lower back. Reports compliance with HEP.         TREATMENT   Precautions:    Pain: 5/10 LBP    \"X in shaded column indicates activity completed today    *\" next to exercise/intervention indicates progression   Modalities Parameters/  Location  Notes Manual Therapy Time/Technique  Notes                     Exercise/Intervention   Notes   NuStep* seat 10 arms 9 5 min L 3 x           TA contraction 10x 5 sec x    TA + march 10x  x    TA with ball* 10x  x    TA with band* 10x orange x           SKTC 3x 30 sec x    Piriformis stretch* 3x 15 sec x Tolerated today   LTR 5x 5* sec x Decreased hold time today   HS stretch* 3x 15 sec x                    Specific Interventions Next Treatment: nustep warm up, core strengthening, hip strengthening, hip stretching and lumbar stretching    Activity/Treatment Tolerance:  [x]  Patient tolerated treatment well  []  Patient limited by fatigue  []  Patient limited by pain   []  Patient limited by medical complications  []  Other:     Assessment:  Patient completed exercises as listed above working on flexibility and strength. Provided cues for correct exercise technique. Good tolerance with progressions today. Will continue to progress as tolerated by patient per POC. GOALS:  Patient Goal: reduce pain, return to walking    Short Term Goals:  Time Frame: 3 weeks    1. Patient will report decrease in back and hip pain to 3/10 at most to promote ease of household tasks. 2. Patient will improve B hip strength to 4+/5 to improve ability to ambulate with a normalized gait pattern. 3. Patient will perform B SLR for 10 seconds to promote increased core strength needed for ease of standing tasks. 4. Patient will be indep with HEP in order to meet long term goals. Long Term Goals:  Time Frame: 6 wks    1. Patient will improve Modified Oswestry score from 20/50 to 12/50 to allow decrease in disability and improved functional mobility for improved overall QOL. 2. Patient will be indep with HEP in order to prevent re-injury and improve ability to perform functional mobility tasks.        Patient Education:   [x]  HEP/Education Completed: Pt has heart monitor until 10/31, will begin pool therapy on 11/1 once monitor is removed. Updated handouts for HEP. Vidly Access Code: [email or web address]  []  No new Education completed  []  Reviewed Prior HEP      [x]  Patient verbalized and/or demonstrated understanding of education provided. []  Patient unable to verbalize and/or demonstrate understanding of education provided. Will continue education. []  Barriers to learning:     PLAN:  Treatment Recommendations: Strengthening, Range of Motion, Balance Training, Manual Therapy - Soft Tissue Mobilization, Manual Therapy - Joint Manipulation, Pain Management, Home Exercise Program, Patient Education, Safety Education and Training, Aquatics, and Modalities    []  Plan of care initiated. Plan to see patient 2 times per week for 6 weeks to address the treatment planned outlined above.  Will begin on land and transition to pool when heart monitor is removed  [x]  Continue with current plan of care  []  Modify plan of care as follows:    []  Hold pending physician visit  []  Discharge    Time In 1000   Time Out 1040   Timed Code Minutes: 40 min   Total Treatment Time: 40 min     Electronically Signed by: Adolph Fields PTA [id number]

## 2022-10-25 ENCOUNTER — APPOINTMENT (OUTPATIENT)
Dept: NON INVASIVE DIAGNOSTICS | Age: 72
End: 2022-10-25
Payer: MEDICARE

## 2022-10-27 ENCOUNTER — HOSPITAL ENCOUNTER (OUTPATIENT)
Dept: PHYSICAL THERAPY | Age: 72
Setting detail: THERAPIES SERIES
Discharge: HOME | End: 2022-10-27
Payer: MEDICARE

## 2022-10-27 PROCEDURE — 97110 THERAPEUTIC EXERCISES: CPT

## 2022-10-27 NOTE — PROGRESS NOTES
[place]  PHYSICAL THERAPY  [] EVALUATION  [x] DAILY NOTE (LAND) [] DAILY NOTE (AQUATIC ) [] PROGRESS NOTE [] DISCHARGE NOTE    [x] OUTPATIENT REHABILITATION CENTER [facility]   [] [place] [address]    [] [facility]   [] Sade See    Date: 10/27/2022  Patient Name:  Nathaneil Birch  : 1950  MRN: [id number]  CSN: [id number]    Referring Practitioner Alyssa Seymour, APR*   Diagnosis Lumbar back pain with radiculopathy affecting lower extremity [M54.16]    Treatment Diagnosis core and LE weakness, hip stiffness, impaired posture and impaired balance   Date of Evaluation 10/18/22    Additional Pertinent History HTN, DM , B TKA      Functional Outcome Measure Used Modified Oswestry   Functional Outcome Score  (10/18/22)       Insurance: Primary: Payor: Ezio Castro /  /  / ,   Secondary:    Authorization Information: No Pre-cert required   Visit # 3, 3/10 for progress note   Visits Allowed: UNLIMITED VISITS BASED ON MEDICAL NECESSITY   Recertification Date: 2022   Physician Follow-Up: Nov 3, 2022   Physician Orders: Bc Portillo and treat   History of Present Illness: Pt is a 69 y/o female presenting to skilled PT services with c/o low back pain. Pt reports experiencing back pain for years of insidious onset. Reports radicular pain into B LE, especially hips, within the last year. Pain is not constant but comes and goes. Pain increases with standing activity and walking, decreases with sitting. Denies n/t. No imaging completed. Takes motrin when leaving the home which does provide a little relief. SUBJECTIVE:  Patient states she is feeling better. Rates current pain 4/10 across lower back. Reports compliance with HEP.       TREATMENT   Precautions:    Pain: 4/10 LBP    \"X in shaded column indicates activity completed today    *\" next to exercise/intervention indicates progression   Modalities Parameters/  Location  Notes Manual Therapy Time/Technique  Notes                     Exercise/Intervention   Notes   NuStep* seat 10 arms 9 5 min L 3 x           TA contraction 10x 5 sec x    TA + march 10x  x    TA with ball 10x  x    TA with band 10x orange x    TA with leg extension* 5x 2 sec x Cues for ab brace   TA with alternating UE and LE* 10x  x Cues for ab brace   Bent knee fallouts* 10x  c           SKTC 3x 30 sec x    Piriformis stretch 3x 15 sec x Tolerated today   LTR 5x 5 sec x    HS stretch 3x 15 sec x                    Specific Interventions Next Treatment: nustep warm up, core strengthening, hip strengthening, hip stretching and lumbar stretching    Activity/Treatment Tolerance:  [x]  Patient tolerated treatment well  []  Patient limited by fatigue  []  Patient limited by pain   []  Patient limited by medical complications  []  Other:     Assessment: Continued with exercises as listed above working on flexibility and strength. Progressed core stability exercises with good tolerance. Will continue to progress as tolerated by patient per POC. GOALS:  Patient Goal: reduce pain, return to walking    Short Term Goals:  Time Frame: 3 weeks    1. Patient will report decrease in back and hip pain to 3/10 at most to promote ease of household tasks. 2. Patient will improve B hip strength to 4+/5 to improve ability to ambulate with a normalized gait pattern. 3. Patient will perform B SLR for 10 seconds to promote increased core strength needed for ease of standing tasks. 4. Patient will be indep with HEP in order to meet long term goals. Long Term Goals:  Time Frame: 6 wks    1. Patient will improve Modified Oswestry score from 20/50 to 12/50 to allow decrease in disability and improved functional mobility for improved overall QOL. 2. Patient will be indep with HEP in order to prevent re-injury and improve ability to perform functional mobility tasks.        Patient Education:   [x]  HEP/Education Completed: Pt has heart monitor until 10/31, will begin pool therapy on 11/1 once monitor is removed. Updated handouts for HEP. Goji Access Code: [email or web address]  []  No new Education completed  []  Reviewed Prior HEP      [x]  Patient verbalized and/or demonstrated understanding of education provided. []  Patient unable to verbalize and/or demonstrate understanding of education provided. Will continue education. []  Barriers to learning:     PLAN:  Treatment Recommendations: Strengthening, Range of Motion, Balance Training, Manual Therapy - Soft Tissue Mobilization, Manual Therapy - Joint Manipulation, Pain Management, Home Exercise Program, Patient Education, Safety Education and Training, Aquatics, and Modalities    []  Plan of care initiated. Plan to see patient 2 times per week for 6 weeks to address the treatment planned outlined above.  Will begin on land and transition to pool when heart monitor is removed  [x]  Continue with current plan of care  []  Modify plan of care as follows:    []  Hold pending physician visit  []  Discharge    Time In 0955   Time Out 1035   Timed Code Minutes: 40 min   Total Treatment Time: 40 min     Electronically Signed by: Gala Posada, PTA [id number]

## 2022-11-01 ENCOUNTER — HOSPITAL ENCOUNTER (OUTPATIENT)
Dept: PHYSICAL THERAPY | Age: 72
Setting detail: THERAPIES SERIES
Discharge: HOME | End: 2022-11-01
Payer: MEDICARE

## 2022-11-01 PROCEDURE — 97113 AQUATIC THERAPY/EXERCISES: CPT

## 2022-11-01 NOTE — PROGRESS NOTES
[address] [facility]  PHYSICAL THERAPY  [] EVALUATION  [] DAILY NOTE (LAND) [x] DAILY NOTE (AQUATIC ) [] PROGRESS NOTE [] DISCHARGE NOTE    [x] OUTPATIENT REHABILITATION [facility]   [] [place] [address]    [] [facility]   [] Solomon Baker    Date: 2022  Patient Name:  Ryan Chavarria  : 1950  MRN: [id number]  CSN: [id number]    Referring Practitioner Michelle Castano, APR*   Diagnosis Lumbar back pain with radiculopathy affecting lower extremity [M54.16]    Treatment Diagnosis core and LE weakness, hip stiffness, impaired posture and impaired balance   Date of Evaluation 10/18/22    Additional Pertinent History HTN, DM , B TKA      Functional Outcome Measure Used Modified Oswestry   Functional Outcome Score  (10/18/22)       Insurance: Primary: Payor: Omid Rios /  /  / ,   Secondary:    Authorization Information: No Pre-cert required   Visit # 4, 410 for progress note   Visits Allowed: UNLIMITED VISITS BASED ON MEDICAL NECESSITY   Recertification Date: 2022   Physician Follow-Up: Nov 3, 2022   Physician Orders: Perdomo Daughters and treat   History of Present Illness: Pt is a 69 y/o female presenting to skilled PT services with c/o low back pain. Pt reports experiencing back pain for years of insidious onset. Reports radicular pain into B LE, especially hips, within the last year. Pain is not constant but comes and goes. Pain increases with standing activity and walking, decreases with sitting. Denies n/t. No imaging completed. Takes motrin when leaving the home which does provide a little relief. SUBJECTIVE:  Patient presented in pool today. Reports more back soreness at 7.5/10 today. Reports soreness after last land visit.      AQUATICS TREATMENT   Precautions:    Pain: 7.5/10 back pain    X in shaded column indicates activity completed today   Exercise/Intervention Sets/Sec  Notes   Walk Forward 3 laps  x    Walk Backward 3 laps  x    Walk Sideways 3 laps  x           Lower Extremity Exercises:       Heel/Toe Raises 10  x With ab brace   Marches 10  x \"   Squats 10  x    3 Way Hip 10  x \"   Hamstring Curls 10  x \"   Lunges       Step-Ups: fwd and lat 10  x           Lower Extremity Stretches:       HS stretch at bench 3x30 sec  x           Seated Exercises:              Upper Extremity Exercises:       Shoulder Flexion       Shoulder ABD/ADD       Shoulder Horizontal ABD/ADD       Shoulder IR/ER       Shoulder Circles       Shoulder Shrugs       Rows       Bicep Curls              Upper Extremity Stretches:              Balance:              Dynamic Gait:              Deep Water:    4'10\", staying close to steps   Hang 3 min  x    Bicycle 2 min  x    Hip ABD/ADD 2 min  x    Hip Flex/Ext         Specific Interventions Next Treatment: nustep warm up, core strengthening, hip strengthening, hip stretching and lumbar stretching    Activity/Treatment Tolerance:  [x]  Patient tolerated treatment well  []  Patient limited by fatigue  []  Patient limited by pain   []  Patient limited by medical complications  []  Other:     Assessment: Initiated aquatic therapy today which pt tolerated well. Was cued for ab brace throughout interventions and on exercise technique. Pt is hesitant in deeper water ad remained by HR throughout session. Reported decreased pain at end of session to 5.5/10. Will continue to progress as tolerated by patient per POC. GOALS:  Patient Goal: reduce pain, return to walking    Short Term Goals:  Time Frame: 3 weeks    1. Patient will report decrease in back and hip pain to 3/10 at most to promote ease of household tasks. 2. Patient will improve B hip strength to 4+/5 to improve ability to ambulate with a normalized gait pattern. 3. Patient will perform B SLR for 10 seconds to promote increased core strength needed for ease of standing tasks. 4. Patient will be indep with HEP in order to meet long term goals.     Long Term Goals: Time Frame: 6 wks    1. Patient will improve Modified Oswestry score from 20/50 to 12/50 to allow decrease in disability and improved functional mobility for improved overall QOL. 2. Patient will be indep with HEP in order to prevent re-injury and improve ability to perform functional mobility tasks. Patient Education:   [x]  HEP/Education Completed: initiated aquatic therapy, continue HEP. Exp[employer] Access Code: [email or web address]  []  No new Education completed  []  Reviewed Prior HEP      [x]  Patient verbalized and/or demonstrated understanding of education provided. []  Patient unable to verbalize and/or demonstrate understanding of education provided. Will continue education. []  Barriers to learning:     PLAN:  Treatment Recommendations: Strengthening, Range of Motion, Balance Training, Manual Therapy - Soft Tissue Mobilization, Manual Therapy - Joint Manipulation, Pain Management, Home Exercise Program, Patient Education, Safety Education and Training, Aquatics, and Modalities    []  Plan of care initiated. Plan to see patient 2 times per week for 6 weeks to address the treatment planned outlined above.  Will begin on land and transition to pool when heart monitor is removed  [x]  Continue with current plan of care  []  Modify plan of care as follows:    []  Hold pending physician visit  []  Discharge    Time In 1000   Time Out 1042   Timed Code Minutes: 42 min   Total Treatment Time: 42 min     Electronically Signed by: Isis Jarquin, PT [id number]

## 2022-11-03 ENCOUNTER — OFFICE VISIT (OUTPATIENT)
Dept: FAMILY MEDICINE CLINIC | Age: 72
End: 2022-11-03
Payer: MEDICARE

## 2022-11-03 ENCOUNTER — HOSPITAL ENCOUNTER (OUTPATIENT)
Dept: PHYSICAL THERAPY | Age: 72
Setting detail: THERAPIES SERIES
Discharge: HOME | End: 2022-11-03
Payer: MEDICARE

## 2022-11-03 VITALS
DIASTOLIC BLOOD PRESSURE: 76 MMHG | BODY MASS INDEX: 47.96 KG/M2 | RESPIRATION RATE: 14 BRPM | HEART RATE: 75 BPM | HEIGHT: 62 IN | SYSTOLIC BLOOD PRESSURE: 116 MMHG | TEMPERATURE: 98.3 F | WEIGHT: 260.6 LBS

## 2022-11-03 DIAGNOSIS — E11.22 TYPE 2 DIABETES MELLITUS WITH STAGE 2 CHRONIC KIDNEY DISEASE, WITHOUT LONG-TERM CURRENT USE OF INSULIN (HCC): ICD-10-CM

## 2022-11-03 DIAGNOSIS — Z12.31 ENCOUNTER FOR SCREENING MAMMOGRAM FOR BREAST CANCER: ICD-10-CM

## 2022-11-03 DIAGNOSIS — N18.2 TYPE 2 DIABETES MELLITUS WITH STAGE 2 CHRONIC KIDNEY DISEASE, WITHOUT LONG-TERM CURRENT USE OF INSULIN (HCC): ICD-10-CM

## 2022-11-03 DIAGNOSIS — Z78.0 ASYMPTOMATIC MENOPAUSAL STATE: ICD-10-CM

## 2022-11-03 DIAGNOSIS — Z00.00 MEDICARE ANNUAL WELLNESS VISIT, SUBSEQUENT: Primary | ICD-10-CM

## 2022-11-03 PROCEDURE — 1123F ACP DISCUSS/DSCN MKR DOCD: CPT | Performed by: NURSE PRACTITIONER

## 2022-11-03 PROCEDURE — 3044F HG A1C LEVEL LT 7.0%: CPT | Performed by: NURSE PRACTITIONER

## 2022-11-03 PROCEDURE — G0439 PPPS, SUBSEQ VISIT: HCPCS | Performed by: NURSE PRACTITIONER

## 2022-11-03 PROCEDURE — 3078F DIAST BP <80 MM HG: CPT | Performed by: NURSE PRACTITIONER

## 2022-11-03 PROCEDURE — 97113 AQUATIC THERAPY/EXERCISES: CPT

## 2022-11-03 PROCEDURE — 3074F SYST BP LT 130 MM HG: CPT | Performed by: NURSE PRACTITIONER

## 2022-11-03 ASSESSMENT — PATIENT HEALTH QUESTIONNAIRE - PHQ9
SUM OF ALL RESPONSES TO PHQ9 QUESTIONS 1 & 2: 0
1. LITTLE INTEREST OR PLEASURE IN DOING THINGS: 0
SUM OF ALL RESPONSES TO PHQ QUESTIONS 1-9: 0
SUM OF ALL RESPONSES TO PHQ QUESTIONS 1-9: 0
2. FEELING DOWN, DEPRESSED OR HOPELESS: 0
SUM OF ALL RESPONSES TO PHQ QUESTIONS 1-9: 0
SUM OF ALL RESPONSES TO PHQ QUESTIONS 1-9: 0

## 2022-11-03 ASSESSMENT — LIFESTYLE VARIABLES
HOW OFTEN DO YOU HAVE A DRINK CONTAINING ALCOHOL: NEVER
HOW MANY STANDARD DRINKS CONTAINING ALCOHOL DO YOU HAVE ON A TYPICAL DAY: PATIENT DOES NOT DRINK

## 2022-11-03 NOTE — PROGRESS NOTES
Medicare Annual Wellness Visit    Dez Lopez is here for Medicare AWV    Assessment & Plan   Medicare annual wellness visit, subsequent  Type 2 diabetes mellitus with stage 2 chronic kidney disease, without long-term current use of insulin ([facility] [address]  -     dapagliflozin (FARXIGA) 10 MG tablet; TAKE 1 TABLET EVERY MORNING, Disp-90 tablet, R-1Normal  Asymptomatic menopausal state  -     DEXA BONE DENSITY 2 SITES; Future  Encounter for screening mammogram for breast cancer  -     RADHA DIGITAL SCREEN W OR WO CAD BILATERAL; Future    Recommendations for Preventive Services Due: see orders and patient instructions/AVS.  Recommended screening schedule for the next 5-10 years is provided to the patient in written form: see Patient Instructions/AVS.     Return for Medicare Annual Wellness Visit in 1 year. Subjective   The following acute and/or chronic problems were also addressed today:      Patient's complete Health Risk Assessment and screening values have been reviewed and are found in Flowsheets. The following problems were reviewed today and where indicated follow up appointments were made and/or referrals ordered.     Positive Risk Factor Screenings with Interventions:             General Health and ACP:  General  In general, how would you say your health is?: Fair  In the past 7 days, have you experienced any of the following: New or Increased Pain, New or Increased Fatigue, Loneliness, Social Isolation, Stress or Anger?: No  Do you get the social and emotional support that you need?: Yes  Do you have a Living Will?: (!) No    Advance Directives       Power of  Living Will ACP-Advance Directive ACP-Power of     Not on File Not on File Not on File Filed        General Health Risk Interventions:  No Living Will: Patient declines ACP discussion/assistance    Health Habits/Nutrition:  Physical Activity: Insufficiently Active    Days of Exercise per Week: 2 days    Minutes of Exercise per Session: 40 APRN - CNP   metFORMIN (GLUCOPHAGE) 850 MG tablet TAKE 1 TABLET TWICE A DAY WITH MEALS Yes Darvin Castillo, TIA - [place]   hydroCHLOROthiazide (HYDRODIURIL) 25 MG tablet TAKE 1 TABLET DAILY Yes Gilson Martínez MD   atorvastatin (LIPITOR) 80 MG tablet TAKE 1 TABLET DAILY Yes Caitlyn Perkins MD   metoprolol (LOPRESSOR) 100 MG tablet TAKE 1 TABLET TWICE A DAY Yes Gilson Martínez MD   losartan (COZAAR) 50 MG tablet TAKE 1 TABLET TWICE A DAY Yes Gilson Martínez MD   hydrALAZINE (APRESOLINE) 50 MG tablet TAKE 1 TABLET TWICE A DAY Yes Gilson Martínez MD   NIFEdipine (ADALAT CC) 90 MG extended release tablet TAKE 1 TABLET TWICE A DAY Yes Gilson Martínez MD   Cyanocobalamin (B-12 PO) Take by mouth Yes Historical Provider, MD   Cholecalciferol (VITAMIN D3 PO) Take by mouth Yes Historical Provider, MD   OLIVE LEAF EXTRACT PO Take by mouth Yes Historical Provider, MD   ELDERBERRY PO Take by mouth Yes Historical Provider, MD   Accu-Chek Multiclix Lancets MISC 1 Units by Does not apply route 4 times daily Yes Caitlyn Perkins MD   blood glucose test strips (ACCU-CHEK ACTIVE STRIPS) strip 1 each by In Vitro route 4 times daily As needed. Yes Caitlyn Perkins MD   INSULIN SYRINGE 1CC/29G 29G X 1/2\" 1 ML MISC 1 each by Does not apply route daily Yes Caitlyn Perkins MD   Needle, Disp, (HYPODERMIC NEEDLE 60ON8-0/4\") 27G X 1-1/4\" MISC 1 Units by Does not apply route daily Yes Caitlyn Perkins MD   aspirin 81 MG tablet Take 81 mg by mouth daily Yes Historical Provider, MD Da Silva (Including outside providers/suppliers regularly involved in providing care):   Patient Care Team:  Caitlyn Perkins MD as PCP - General (Family Medicine)  Caitlyn Perkins MD as PCP - [facility] Provider     Reviewed and updated this visit:  Tobacco  Allergies  Meds  Problems  Med Hx  Surg Hx  Soc Hx  Fam Hx               Cardiovascular Disease Risk Counseling: Assessed the patient's risk to develop cardiovascular disease and reviewed main risk factors.    Reviewed steps to reduce disease risk including:   Quitting tobacco use, reducing amount smoked, or not starting the habit  Making healthy food choices  Being physically active and gradualy increasing activity levels   Reduce weight and determine a healthy BMI goal  Monitor blood pressure and treat if higher than 140/90 mmHg  Maintain blood total cholesterol levels under 5 mmol/l or 190 mg/dl  Maintain LDL cholesterol levels under 3.0 mmol/l or 115 mg/dl   Control blood glucose levels  Consider taking aspirin (75 mg daily), once blood pressure is controlled   Provided a follow up plan. Time spent (minutes): 3  Obesity Counseling: Assessed behavioral health risks and factors affecting choice of behavior. Suggested weight control approaches, including dietary changes behavioral modification and follow up plan. Provided educational and support documentation.   Time spent (minutes): 3

## 2022-11-03 NOTE — PROGRESS NOTES
[facility]  PHYSICAL THERAPY  [] EVALUATION  [] DAILY NOTE (LAND) [x] DAILY NOTE (AQUATIC ) [] PROGRESS NOTE [] DISCHARGE NOTE    [x] OUTPATIENT REHABILITATION [facility]   [] [place] [address]    [] [facility]   [] Cassie Corcoran    Date: 11/3/2022  Patient Name:  Ghazala Crowder  : 1950  MRN: [id number]  CSN: [id number]    Referring Practitioner Randy Bowling, APR*   Diagnosis Lumbar back pain with radiculopathy affecting lower extremity [M54.16]    Treatment Diagnosis core and LE weakness, hip stiffness, impaired posture and impaired balance   Date of Evaluation 10/18/22    Additional Pertinent History HTN, DM , B TKA      Functional Outcome Measure Used Modified Oswestry   Functional Outcome Score  (10/18/22)       Insurance: Primary: Payor: [employer] /  /  / ,   Secondary:    Authorization Information: No Pre-cert required   Visit # 5, 5/10 for progress note   Visits Allowed: UNLIMITED VISITS BASED ON MEDICAL NECESSITY   Recertification Date: 2022   Physician Follow-Up: Nov 3, 2022   Physician Orders: Shaunna De Luna and treat   History of Present Illness: Pt is a 71 y/o female presenting to skilled PT services with c/o low back pain. Pt reports experiencing back pain for years of insidious onset. Reports radicular pain into B LE, especially hips, within the last year. Pain is not constant but comes and goes. Pain increases with standing activity and walking, decreases with sitting. Denies n/t. No imaging completed. Takes motrin when leaving the home which does provide a little relief. SUBJECTIVE:  Patient states she was sore after first visit in the pool but is feeling better today. Rates current pain 5/10 in lower back. Reports compliance with HEP.     AQUATICS TREATMENT   Precautions:    Pain: 5/10 back pain    X in shaded column indicates activity completed today   Exercise/Intervention Sets/Sec  Notes   Walk Forward 3 laps  x Walk Backward 3 laps  x    Walk Sideways 3 laps  x           Lower Extremity Exercises:       Heel/Toe Raises 12*  x With ab brace   Marches 12*  x \"   Squats 12*  x    3 Way Hip 12*ea  x \"   Hamstring Curls 12*  x \"   Lunges       Step-Ups: fwd and lat 10 ea  x           Lower Extremity Stretches:       HS stretch at bench 3x30 sec             Seated Exercises:              Upper Extremity Exercises:       Shoulder Flexion       Shoulder ABD/ADD       Shoulder Horizontal ABD/ADD       Shoulder IR/ER       Shoulder Circles       Shoulder Shrugs       Rows       Bicep Curls              Upper Extremity Stretches:              Balance:              Dynamic Gait:              [place]:    4'10\", staying close to steps   Hang 5* min  x    Bicycle 2 min  x    Hip ABD/ADD 2 min  x    Hip Flex/Ext         Specific Interventions Next Treatment: nustep warm up, core strengthening, hip strengthening, hip stretching and lumbar stretching    Activity/Treatment Tolerance:  [x]  Patient tolerated treatment well  []  Patient limited by fatigue  []  Patient limited by pain   []  Patient limited by medical complications  []  Other:     Assessment: Continued with aquatics as listed above working on flexibility and strength. Few progressions today due to increase in soreness after last visit. Good tolerance with session. Provided cues for correct technique. Will continue to progress as tolerated by patient per POC. GOALS:  Patient Goal: reduce pain, return to walking    Short Term Goals:  Time Frame: 3 weeks    1. Patient will report decrease in back and hip pain to 3/10 at most to promote ease of household tasks. 2. Patient will improve B hip strength to 4+/5 to improve ability to ambulate with a normalized gait pattern. 3. Patient will perform B SLR for 10 seconds to promote increased core strength needed for ease of standing tasks. 4. Patient will be indep with HEP in order to meet long term goals.     Long Term Goals: Time Frame: 6 wks    1. Patient will improve Modified Oswestry score from 20/50 to 12/50 to allow decrease in disability and improved functional mobility for improved overall QOL. 2. Patient will be indep with HEP in order to prevent re-injury and improve ability to perform functional mobility tasks. Patient Education:   [x]  HEP/Education Completed: initiated aquatic therapy, continue HEP. [employer] Access Code: [email or web address]  []  No new Education completed  []  Reviewed Prior HEP      [x]  Patient verbalized and/or demonstrated understanding of education provided. []  Patient unable to verbalize and/or demonstrate understanding of education provided. Will continue education. []  Barriers to learning:     PLAN:  Treatment Recommendations: Strengthening, Range of Motion, Balance Training, Manual Therapy - Soft Tissue Mobilization, Manual Therapy - Joint Manipulation, Pain Management, Home Exercise Program, Patient Education, Safety Education and Training, Aquatics, and Modalities    []  Plan of care initiated. Plan to see patient 2 times per week for 6 weeks to address the treatment planned outlined above.  Will begin on land and transition to pool when heart monitor is removed  [x]  Continue with current plan of care  []  Modify plan of care as follows:    []  Hold pending physician visit  []  Discharge    Time In 1345   Time Out 1425   Timed Code Minutes: 40 min   Total Treatment Time: 40 min     Electronically Signed by: Liza Gaspar, PTA [id number]

## 2022-11-03 NOTE — PATIENT INSTRUCTIONS
You may receive a survey about your visit with us today. The feedback from our patients helps us identify what is working well and where the service to all patients can be enhanced. Thank you! Advance Directives: Care Instructions  Overview  An advance directive is a legal way to state your wishes at the end of your life. It tells your family and your doctor what to do if you can't say what you want. There are two main types of advance directives. You can change them any time your wishes change. Living will. This form tells your family and your doctor your wishes about life support and other treatment. The form is also called a declaration. Medical power of . This form lets you name a person to make treatment decisions for you when you can't speak for yourself. This person is called a health care agent (health care proxy, health care surrogate). The form is also called a durable power of  for health care. If you do not have an advance directive, decisions about your medical care may be made by a family member, or by a doctor or a  who doesn't know you. It may help to think of an advance directive as a gift to the people who care for you. If you have one, they won't have to make tough decisions by themselves. Follow-up care is a key part of your treatment and safety. Be sure to make and go to all appointments, and call your doctor if you are having problems. It's also a good idea to know your test results and keep a list of the medicines you take. What should you include in an advance directive? Many states have a unique advance directive form. (It may ask you to address specific issues.) Or you might use a universal form that's approved by many states. If your form doesn't tell you what to address, it may be hard to know what to include in your advance directive. Use the questions below to help you get started.   Who do you want to make decisions about your medical care if you are not able to? What life-support measures do you want if you have a serious illness that gets worse over time or can't be cured? What are you most afraid of that might happen? (Maybe you're afraid of having pain, losing your independence, or being kept alive by machines.)  Where would you prefer to die? (Your home? A hospital? A nursing home?)  Do you want to donate your organs when you die? Do you want certain Roman Catholic practices performed before you die? When should you call for help? Be sure to contact your doctor if you have any questions. Where can you learn more? Go to https://chpepiceweb.[email or web address] org and sign in to your Bill the Butcher account. Enter R264 in the BioConsortia box to learn more about \"Advance Directives: Care Instructions. \"     If you do not have an account, please click on the \"Sign Up Now\" link. Current as of: June 16, 2022               Content Version: 13.4  © 2006-2022 Jigsaw. Care instructions adapted under license by [facility] ([facility]). If you have questions about a medical condition or this instruction, always ask your healthcare professional. [place] [address] any warranty or liability for your use of this information. Learning About Medical Power of   What is a medical power of ? A medical power of , also called a durable power of  for health care, is one type of the legal forms called advance directives. It lets you name the person you want to make treatment decisions for you if you can't speak or decide for yourself. The person you choose is called your health care agent. This person is also called a health care proxy or health care surrogate. A medical power of  may be called something else in your state. How do you choose a health care agent? Choose your health care agent carefully. This person may or may not be a family member.   Talk to the person before you make your final decision. Make sure he or she is comfortable with this responsibility. It's a good idea to choose someone who:  Is at least 25years old. Knows you well and understands what makes life meaningful for you. Understands your Spiritism and moral values. Will do what you want, not what he or she wants. Will be able to make difficult choices at a stressful time. Will be able to refuse or stop treatment, if that is what you would want, even if you could die. Will be firm and confident with health professionals if needed. Will ask questions to get needed information. Lives near you or agrees to travel to you if needed. Your family may help you make medical decisions while you can still be part of that process. But it's important to choose one person to be your health care agent in case you aren't able to make decisions for yourself. If you don't fill out the legal form and name a health care agent, the decisions your family can make may be limited. A health care agent may be called something else in your state. Who will make decisions for you if you don't have a health care agent? If you don't have a health care agent or a living will, you may not get the care you want. Decisions may be made by family members who disagree about your medical care. Or decisions may be made by a medical professional who doesn't know you well. In some cases, a  makes the decisions. When you name a health care agent, it is very clear who has the power to make health decisions for you. How do you name a health care agent? You name your health care agent on a legal form. This form is usually called a medical power of . Ask your hospital, state bar association, or office on aging where to find these forms. You must sign the form to make it legal. Some states require you to get the form notarized. This means that a person called a  watches you sign the form and then he or she signs the form.  Some states also require that two or more witnesses sign the form. Be sure to tell your family members and doctors who your health care agent is. Where can you learn more? Go to https://[email or web address].People [email or web address] org and sign in to your Authentium account. Enter [id number] in the [facility] box to learn more about \"Learning About [address]. \"     If you do not have an account, please click on the \"Sign Up Now\" link. Current as of: October 6, 2021               Content Version: 13.4  © 5970-7500 [employer]. Care instructions adapted under license by [address] If you have questions about a medical condition or this instruction, always ask your healthcare professional. [address] any warranty or liability for your use of this information. Learning About Living Perroy  What is a living will? A living will, also called a declaration, is a legal form. It tells your family and your doctor your wishes when you can't speak for yourself. It's used by the health professionals who will treat you as you near the end of your life or if you get seriously hurt or ill. If you put your wishes in writing, your loved ones and others will know what kind of care you want. They won't need to guess. This can ease your mind and be helpful to others. And you can change or cancel your living will at any time. A living will is not the same as an estate or property will. An estate will explains what you want to happen with your money and property after you die. How do you use it? Keep these facts in mind about how a living will is used. Your living will is used only if you can't speak or make decisions for yourself. Most often, one or more doctors must certify that you can't speak or decide for yourself before your living will takes effect. If you get better and can speak for yourself again, you can accept or refuse any treatment.  It doesn't matter what you said in your living will. Some states may limit your right to refuse treatment in certain cases. For example, you may need to clearly state in your living will that you don't want artificial hydration and nutrition, such as being fed through a tube. Is a living will a legal document? A living will is a legal document. Each state has its own laws about living watson. And a living will may be called something else in your state. Here are some things to know about living watson. You don't need an  to complete a living will. But legal advice can be helpful if your state's laws are unclear. It can also help if your health history is complicated or your family can't agree on what should be in your living will. You can change your living will at any time. Some people find that their wishes about end-of-life care change as their health changes. If you make big changes to your living will, complete a new form. If you move to another state, make sure that your living will is legal in the state where you now live. In most cases, doctors will respect your wishes even if you have a form from a different state. You might use a universal form that has been approved by many states. This kind of form can sometimes be filled out and stored online. Your digital copy will then be available wherever you have a connection to the internet. The doctors and nurses who need to treat you can find it right away. Your state may offer an online registry. This is another place where you can store your living will online. It's a good idea to get your living will notarized. This means using a person called a  to watch two people sign, or witness, your living will. What should you know when you create a living will? Here are some questions to ask yourself as you make your living will. Do you know enough about life support methods that might be used?  If not, talk to your doctor so you know what might be done if you can't breathe on your own, your heart stops, or you can't swallow. What things would you still want to be able to do after you receive life-support methods? Would you want to be able to walk? To speak? To eat on your own? To live without the help of machines? Do you want certain Hindu practices performed if you become very ill? If you have a choice, where do you want to be cared for? In your home? At a hospital or nursing home? If you have a choice at the end of your life, where would you prefer to die? At home? In a hospital or nursing home? Somewhere else? Would you prefer to be buried or cremated? Do you want your organs to be donated after you die? What should you do with your living will? Make sure that your family members and your health care agent have copies of your living will (also called a declaration). Give your doctor a copy of your living will. Ask to have it kept as part of your medical record. If you have more than one doctor, make sure that each one has a copy. Put a copy of your living will where it can be easily found. For example, some people may put a copy on their refrigerator door. If you are using a digital copy, be sure your doctor, family members, and health care agent know how to find and access it. Where can you learn more? Go to https://GeoDigitalrenueb.JIT [email or web address] org and sign in to your Aimetis account. Enter J298 in the Tedcas box to learn more about \"Learning About Living Perroy. \"     If you do not have an account, please click on the \"Sign Up Now\" link. Current as of: June 16, 2022               Content Version: 13.4  © 4865-7369 Healthwise, [employer]. Care instructions adapted under license by [facility] ([facility]). If you have questions about a medical condition or this instruction, always ask your healthcare professional. [address] any warranty or liability for your use of this information.            Body Mass Index: Care Instructions  Your Care Instructions     Body mass index (BMI) can help you see if your weight is raising your risk for health problems. It uses a formula to compare how much you weigh with how tall you are. A BMI lower than 18.5 is considered underweight. A BMI between 18.5 and 24.9 is considered healthy. A BMI between 25 and 29.9 is considered overweight. A BMI of 30 or higher is considered obese. If your BMI is in the normal range, it means that you have a lower risk for weight-related health problems. If your BMI is in the overweight or obese range, you may be at increased risk for weight-related health problems, such as high blood pressure, heart disease, stroke, arthritis or joint pain, and diabetes. If your BMI is in the underweight range, you may be at increased risk for health problems such as fatigue, lower protection (immunity) against illness, muscle loss, bone loss, hair loss, and hormone problems. BMI is just one measure of your risk for weight-related health problems. You may be at higher risk for health problems if you are not active, you eat an unhealthy diet, or you drink too much alcohol or use tobacco products. Follow-up care is a key part of your treatment and safety. Be sure to make and go to all appointments, and call your doctor if you are having problems. It's also a good idea to know your test results and keep a list of the medicines you take. How can you care for yourself at home? Practice healthy eating habits. This includes eating plenty of fruits, vegetables, whole grains, lean protein, and low-fat dairy. If your doctor recommends it, get more exercise. Walking is a good choice. Bit by bit, increase the amount you walk every day. Try for at least 30 minutes on most days of the week. Do not smoke. Smoking can increase your risk for health problems. If you need help quitting, talk to your doctor about stop-smoking programs and medicines.  These can increase your chances of quitting for good. Limit alcohol to 2 drinks a day for men and 1 drink a day for women. Too much alcohol can cause health problems. If you have a BMI higher than 25  Your doctor may do other tests to check your risk for weight-related health problems. This may include measuring the distance around your waist. A waist measurement of more than 40 inches in men or 35 inches in women can increase the risk of weight-related health problems. Talk with your doctor about steps you can take to stay healthy or improve your health. You may need to make lifestyle changes to lose weight and stay healthy, such as changing your diet and getting regular exercise. If you have a BMI lower than 18.5  Your doctor may do other tests to check your risk for health problems. Talk with your doctor about steps you can take to stay healthy or improve your health. You may need to make lifestyle changes to gain or maintain weight and stay healthy, such as getting more healthy foods in your diet and doing exercises to build muscle. Where can you learn more? Go to https://Arisdyne [email or web address].Altiostar [email or web address] org and sign in to your girnarsoft account. Enter S176 in the Helijia box to learn more about \"Body Mass Index: Care Instructions. \"     If you do not have an account, please click on the \"Sign Up Now\" link. Current as of: December 27, 2021               Content Version: 13.4  © 2006-2022 Healthwise, Incorporated. Care instructions adapted under license by [facility] ([facility]). If you have questions about a medical condition or this instruction, always ask your healthcare professional. [place] [address] any warranty or liability for your use of this information. DASH Diet: Care Instructions  Your Care Instructions     The DASH diet is an eating plan that can help lower your blood pressure. DASH stands for Dietary Approaches to Stop Hypertension. Hypertension is high blood pressure.   The DASH diet focuses on eating foods that are high in calcium, potassium, and magnesium. These nutrients can lower blood pressure. The foods that are highest in these nutrients are fruits, vegetables, low-fat dairy products, nuts, seeds, and legumes. But taking calcium, potassium, and magnesium supplements instead of eating foods that are high in those nutrients does not have the same effect. The DASH diet also includes whole grains, fish, and poultry. The DASH diet is one of several lifestyle changes your doctor may recommend to lower your high blood pressure. Your doctor may also want you to decrease the amount of sodium in your diet. Lowering sodium while following the DASH diet can lower blood pressure even further than just the DASH diet alone. Follow-up care is a key part of your treatment and safety. Be sure to make and go to all appointments, and call your doctor if you are having problems. It's also a good idea to know your test results and keep a list of the medicines you take. How can you care for yourself at home? Following the DASH diet  Eat 4 to 5 servings of fruit each day. A serving is 1 medium-sized piece of fruit, ½ cup chopped or canned fruit, 1/4 cup dried fruit, or 4 ounces (½ cup) of fruit juice. Choose fruit more often than fruit juice. Eat 4 to 5 servings of vegetables each day. A serving is 1 cup of lettuce or raw leafy vegetables, ½ cup of chopped or cooked vegetables, or 4 ounces (½ cup) of vegetable juice. Choose vegetables more often than vegetable juice. Get 2 to 3 servings of low-fat and fat-free dairy each day. A serving is 8 ounces of milk, 1 cup of yogurt, or 1 ½ ounces of cheese. Eat 6 to 8 servings of grains each day. A serving is 1 slice of bread, 1 ounce of dry cereal, or ½ cup of cooked rice, pasta, or cooked cereal. Try to choose whole-grain products as much as possible. Limit lean meat, poultry, and fish to 2 servings each day. A serving is 3 ounces, about the size of a deck of cards.   Eat 4 to 5 servings of nuts, seeds, and legumes (cooked dried beans, lentils, and split peas) each week. A serving is 1/3 cup of nuts, 2 tablespoons of seeds, or ½ cup of cooked beans or peas. Limit fats and oils to 2 to 3 servings each day. A serving is 1 teaspoon of vegetable oil or 2 tablespoons of salad dressing. Limit sweets and added sugars to 5 servings or less a week. A serving is 1 tablespoon jelly or jam, ½ cup sorbet, or 1 cup of lemonade. Eat less than 2,300 milligrams (mg) of sodium a day. If you limit your sodium to 1,500 mg a day, you can lower your blood pressure even more. Be aware that all of these are the suggested number of servings for people who eat 1,800 to 2,000 calories a day. Your recommended number of servings may be different if you need more or fewer calories. Tips for success  Start small. Do not try to make dramatic changes to your diet all at once. You might feel that you are missing out on your favorite foods and then be more likely to not follow the plan. Make small changes, and stick with them. Once those changes become habit, add a few more changes. Try some of the following:  Make it a goal to eat a fruit or vegetable at every meal and at snacks. This will make it easy to get the recommended amount of fruits and vegetables each day. Try yogurt topped with fruit and nuts for a snack or healthy dessert. Add lettuce, tomato, cucumber, and onion to sandwiches. Combine a ready-made pizza crust with low-fat mozzarella cheese and lots of vegetable toppings. Try using tomatoes, squash, spinach, broccoli, carrots, cauliflower, and onions. Have a variety of cut-up vegetables with a low-fat dip as an appetizer instead of chips and dip. Sprinkle sunflower seeds or chopped almonds over salads. Or try adding chopped walnuts or almonds to cooked vegetables. Try some vegetarian meals using beans and peas. Add garbanzo or kidney beans to salads.  Make burritos and tacos with mashed sanchez beans or black beans. Where can you learn more? Go to [email or web address] [email or web address] and sign in to your OpenGamma account. Enter U394 in the HRBosshire box to learn more about \"DASH Diet: Care Instructions. \"     If you do not have an account, please click on the \"Sign Up Now\" link. Current as of: January 10, 2022               Content Version: 13.4  © 2006-2022 Healthwise, Incorporated. Care instructions adapted under license by [facility]. If you have questions about a medical condition or this instruction, always ask your healthcare professional. [address] any warranty or liability for your use of this information. Personalized Preventive Plan for Gunnar Merino - 11/3/2022  Medicare offers a range of preventive health benefits. Some of the tests and screenings are paid in full while other may be subject to a deductible, co-insurance, and/or copay. Some of these benefits include a comprehensive review of your medical history including lifestyle, illnesses that may run in your family, and various assessments and screenings as appropriate. After reviewing your medical record and screening and assessments performed today your provider may have ordered immunizations, labs, imaging, and/or referrals for you. A list of these orders (if applicable) as well as your Preventive Care list are included within your After Visit Summary for your review. Other Preventive Recommendations:    A preventive eye exam performed by an eye specialist is recommended every 1-2 years to screen for glaucoma; cataracts, macular degeneration, and other eye disorders. A preventive dental visit is recommended every 6 months. Try to get at least 150 minutes of exercise per week or 10,000 steps per day on a pedometer . Order or download the FREE \"Exercise & Physical Activity: Your Everyday Guide\" from The [employer].  Call 6-833.106.9318 or search The Automatic [employer] on [employer]. You need 8824-0053 mg of calcium and 8302-5282 IU of vitamin D per day. It is possible to meet your calcium requirement with diet alone, but a vitamin D supplement is usually necessary to meet this goal.  When exposed to the sun, use a sunscreen that protects against both UVA and UVB radiation with an SPF of 30 or greater. Reapply every 2 to 3 hours or after sweating, drying off with a towel, or swimming. Always wear a seat belt when traveling in a car. Always wear a helmet when riding a bicycle or motorcycle.

## 2022-11-08 ENCOUNTER — HOSPITAL ENCOUNTER (OUTPATIENT)
Dept: PHYSICAL THERAPY | Age: 72
Setting detail: THERAPIES SERIES
Discharge: HOME | End: 2022-11-08
Payer: MEDICARE

## 2022-11-08 PROCEDURE — 97113 AQUATIC THERAPY/EXERCISES: CPT

## 2022-11-08 NOTE — PROGRESS NOTES
[place]  PHYSICAL THERAPY  [] EVALUATION  [] DAILY NOTE (LAND) [x] DAILY NOTE (AQUATIC ) [] PROGRESS NOTE [] DISCHARGE NOTE    [x] OUTPATIENT REHABILITATION CENTER [facility]   [] [place] [address]    [] [facility]   [] Kimberli Henson    Date: 2022  Patient Name:  Faye Landau  : 1950  MRN: [id number]  CSN: [id number]    Referring Practitioner Hermes Cespedes, APR*   Diagnosis Lumbar back pain with radiculopathy affecting lower extremity [M54.16]    Treatment Diagnosis core and LE weakness, hip stiffness, impaired posture and impaired balance   Date of Evaluation 10/18/22    Additional Pertinent History HTN, DM , B TKA      Functional Outcome Measure Used Modified Oswestry   Functional Outcome Score  (10/18/22)       Insurance: Primary: Payor: Athena Phalen /  /  / ,   Secondary:    Authorization Information: No Pre-cert required   Visit # 6, 6/10 for progress note   Visits Allowed: UNLIMITED VISITS BASED ON MEDICAL NECESSITY   Recertification Date: 2022   Physician Follow-Up: Nov 3, 2022   Physician Orders: Rios Crandall and treat   History of Present Illness: Pt is a 71 y/o female presenting to skilled PT services with c/o low back pain. Pt reports experiencing back pain for years of insidious onset. Reports radicular pain into B LE, especially hips, within the last year. Pain is not constant but comes and goes. Pain increases with standing activity and walking, decreases with sitting. Denies n/t. No imaging completed. Takes motrin when leaving the home which does provide a little relief. SUBJECTIVE:  Patient reports soreness after last visit. States she went home and rested and pain improved. Reports 5/10 L LBP and B thigh pain today. Does feel like aquatic therapy is helping and can tell exercises are getting easier.      AQUATICS TREATMENT   Precautions:    Pain: 5/10 back pain    X in shaded column indicates activity completed today   Exercise/Intervention Sets/Sec  Notes   Walk Forward 3 laps  x    Walk Backward 3 laps  x    Walk Sideways 3 laps  x           Lower Extremity Exercises:       Heel/Toe Raises 12  x With ab brace   Marches 12  x \"   Squats 12  x    3 Way Hip 12 ea  x \"   Hamstring Curls 12  x \"   Lunges*: fwd and lat 10 ea  x    Step-Ups: fwd and lat 12* ea  x           Core Exercises:       Paddle board push and pull fwd and back* 10  x    Paddle board push down* 10  x           Lower Extremity Stretches:       HS stretch at bench 3x30 sec             Seated Exercises:              Upper Extremity Exercises:       Shoulder Flexion       Shoulder ABD/ADD       Shoulder Horizontal ABD/ADD       Shoulder IR/ER       Shoulder Circles       Shoulder Shrugs       Rows       Bicep Curls              Upper Extremity Stretches:              Balance:              Dynamic Gait:              Deep Water:    4'10\", staying close to steps   Hang 5 min  x    Bicycle 2 min  x    Hip ABD/ADD 2 min  x    Hip Flex/Ext         Specific Interventions Next Treatment: nustep warm up, core strengthening, hip strengthening, hip stretching and lumbar stretching    Activity/Treatment Tolerance:  [x]  Patient tolerated treatment well  []  Patient limited by fatigue  []  Patient limited by pain   []  Patient limited by medical complications  []  Other:     Assessment: Several progressions of LE and core strengthening today which pt tolerated well. Cued for technique for optimal muscle activation with lunges. Reported feeling good at end of session with decreased pain to 4/10. Will continue to progress as tolerated. GOALS:  Patient Goal: reduce pain, return to walking    Short Term Goals:  Time Frame: 3 weeks    1. Patient will report decrease in back and hip pain to 3/10 at most to promote ease of household tasks. 2. Patient will improve B hip strength to 4+/5 to improve ability to ambulate with a normalized gait pattern.   3. Patient will perform B SLR for 10 seconds to promote increased core strength needed for ease of standing tasks. 4. Patient will be indep with HEP in order to meet long term goals. Long Term Goals:  Time Frame: 6 wks    1. Patient will improve Modified Oswestry score from 20/50 to 12/50 to allow decrease in disability and improved functional mobility for improved overall QOL. 2. Patient will be indep with HEP in order to prevent re-injury and improve ability to perform functional mobility tasks. Patient Education:   [x]  HEP/Education Completed: continue HEP. [employer] Access Code: [email or web address]  []  No new Education completed  []  Reviewed Prior HEP      [x]  Patient verbalized and/or demonstrated understanding of education provided. []  Patient unable to verbalize and/or demonstrate understanding of education provided. Will continue education. []  Barriers to learning:     PLAN:  Treatment Recommendations: Strengthening, Range of Motion, Balance Training, Manual Therapy - Soft Tissue Mobilization, Manual Therapy - Joint Manipulation, Pain Management, Home Exercise Program, Patient Education, Safety Education and Training, Aquatics, and Modalities    []  Plan of care initiated. Plan to see patient 2 times per week for 6 weeks to address the treatment planned outlined above.  Will begin on land and transition to pool when heart monitor is removed  [x]  Continue with current plan of care  []  Modify plan of care as follows:    []  Hold pending physician visit  []  Discharge    Time In 1020   Time Out 1102   Timed Code Minutes: 42 min   Total Treatment Time: 42 min     Electronically Signed by: Reuben Cabral, [place] [id number]

## 2022-11-09 NOTE — PROCEDURES
[address] [place] [address]                                 EVENT MONITOR    PATIENT NAME: Fernanda Melara                   :        1950  MED REC NO:   [id number]                           ROOM:  ACCOUNT NO:   [de-identified]                           ADMIT DATE: 10/17/2022  PROVIDER:     Kirsty Steele M.D.    TEST TYPE:  Event monitor. CLINICAL HISTORY AND INDICATION:  This is a 57-year-old patient with  shortness of breath. EVENT MONITOR DESCRIPTION:  Event monitor was attached to the patient  between 10/17/2022 and 10/30/2022. EVENT MONITOR FINDINGS:  The baseline rhythm showed sinus rhythm. Short  episodes of atrial fibrillation cannot be excluded that were  self-limited. Otherwise, no obvious sustained arrhythmias. CONCLUSION:  1. Sinus rhythm. 2.  Cannot exclude an episode suspicious for rate controlled atrial  fibrillation that was self-limited, otherwise, no sustained arrhythmias. No pauses. Clinical correlation is recommended.         Pino Jaffe M.D.    D: 2022 7:11:39       T: 2022 11:56:31     [email or web address]  Job#: [id number]     Doc#: [id number]    CC:

## 2022-11-10 ENCOUNTER — HOSPITAL ENCOUNTER (OUTPATIENT)
Dept: PHYSICAL THERAPY | Age: 72
Setting detail: THERAPIES SERIES
Discharge: HOME | End: 2022-11-10
Payer: MEDICARE

## 2022-11-10 PROCEDURE — 97113 AQUATIC THERAPY/EXERCISES: CPT

## 2022-11-10 NOTE — PROGRESS NOTES
[address] [facility]  PHYSICAL THERAPY  [] EVALUATION  [] DAILY NOTE (LAND) [x] DAILY NOTE (AQUATIC ) [] PROGRESS NOTE [] DISCHARGE NOTE    [x] OUTPATIENT REHABILITATION CENTER [facility]   [] [place] [address]    [] [facility]   [] Didi Batch    Date: 11/10/2022  Patient Name:  Zoey Esparza  : 1950  MRN: [id number]  CSN: [id number]    Referring Practitioner Gato Jimenez, APR*   Diagnosis Lumbar back pain with radiculopathy affecting lower extremity [M54.16]    Treatment Diagnosis core and LE weakness, hip stiffness, impaired posture and impaired balance   Date of Evaluation 10/18/22    Additional Pertinent History HTN, DM , B TKA      Functional Outcome Measure Used Modified Oswestry   Functional Outcome Score  (10/18/22)       Insurance: Primary: Payor: Paris Kurtz /  /  / ,   Secondary:    Authorization Information: No Pre-cert required   Visit # 7, 7/10 for progress note   Visits Allowed: UNLIMITED VISITS BASED ON MEDICAL NECESSITY   Recertification Date: 2022   Physician Follow-Up: Nov 3, 2022   Physician Orders: Juany Donis and treat   History of Present Illness: Pt is a 69 y/o female presenting to skilled PT services with c/o low back pain. Pt reports experiencing back pain for years of insidious onset. Reports radicular pain into B LE, especially hips, within the last year. Pain is not constant but comes and goes. Pain increases with standing activity and walking, decreases with sitting. Denies n/t. No imaging completed. Takes motrin when leaving the home which does provide a little relief. SUBJECTIVE:  Patient presents reporting no pain today. Min soreness after last visit that was short lived.      AQUATICS TREATMENT   Precautions:    Pain: 0/10 back pain    X in shaded column indicates activity completed today   Exercise/Intervention Sets/Sec  Notes   Walk Forward 3 laps  x    Walk Backward 3 laps  x    Walk Sideways 3 laps  x Lower Extremity Exercises:       Heel/Toe Raises 15*  x With ab brace   Marches 15*  x \"   Squats 15*  x    3 Way Hip 15* ea  x \"   Hamstring Curls 15*  x \"   Lunges: fwd and lat 10 ea  x    Step-Ups: fwd and lat 12 ea  x           Core Exercises:       Paddle board push and pull fwd and back 10  x    Paddle board push down 10  x           Lower Extremity Stretches:       HS stretch at bench 3x30 sec             Seated Exercises:              Upper Extremity Exercises:       Shoulder Flexion       Shoulder ABD/ADD       Shoulder Horizontal ABD/ADD       Shoulder IR/ER       Shoulder Circles       Shoulder Shrugs       Rows       Bicep Curls              Upper Extremity Stretches:              Balance:              Dynamic Gait:              Deep Water:    4'10\", staying close to steps   Hang 5 min  x    Bicycle 2 min  x    Hip ABD/ADD 2 min  x    Hip Flex/Ext* 2 min  x      Specific Interventions Next Treatment: nustep warm up, core strengthening, hip strengthening, hip stretching and lumbar stretching    Activity/Treatment Tolerance:  [x]  Patient tolerated treatment well  []  Patient limited by fatigue  []  Patient limited by pain   []  Patient limited by medical complications  []  Other:     Assessment: Continued with aquatic therapy today and progressed reps which pt tolerated well. Demonstrated improved technique of lunges today. Reported slight soreness at end of session to 3/10. Pt to monitor soreness following progressions. Will continue to progress as tolerated. GOALS:  Patient Goal: reduce pain, return to walking    Short Term Goals:  Time Frame: 3 weeks    1. Patient will report decrease in back and hip pain to 3/10 at most to promote ease of household tasks. 2. Patient will improve B hip strength to 4+/5 to improve ability to ambulate with a normalized gait pattern. 3. Patient will perform B SLR for 10 seconds to promote increased core strength needed for ease of standing tasks.   4. Patient will be indep with HEP in order to meet long term goals. Long Term Goals:  Time Frame: 6 wks    1. Patient will improve Modified Oswestry score from 20/50 to 12/50 to allow decrease in disability and improved functional mobility for improved overall QOL. 2. Patient will be indep with HEP in order to prevent re-injury and improve ability to perform functional mobility tasks. Patient Education:   [x]  HEP/Education Completed: continue HEP, monitor soreness with progressions. [employer] Access Code: [email or web address]  []  No new Education completed  []  Reviewed Prior HEP      [x]  Patient verbalized and/or demonstrated understanding of education provided. []  Patient unable to verbalize and/or demonstrate understanding of education provided. Will continue education. []  Barriers to learning:     PLAN:  Treatment Recommendations: Strengthening, Range of Motion, Balance Training, Manual Therapy - Soft Tissue Mobilization, Manual Therapy - Joint Manipulation, Pain Management, Home Exercise Program, Patient Education, Safety Education and Training, Aquatics, and Modalities    []  Plan of care initiated. Plan to see patient 2 times per week for 6 weeks to address the treatment planned outlined above.  Will begin on land and transition to pool when heart monitor is removed  [x]  Continue with current plan of care  []  Modify plan of care as follows:    []  Hold pending physician visit  []  Discharge    Time In 1020   Time Out 1100   Timed Code Minutes: 40 min   Total Treatment Time: 40 min     Electronically Signed by: Daniel Lopez, [place] [id number]

## 2022-11-14 ENCOUNTER — TELEPHONE (OUTPATIENT)
Dept: CARDIOLOGY CLINIC | Age: 72
End: 2022-11-14

## 2022-11-14 NOTE — TELEPHONE ENCOUNTER
Please review Event Monitor. Next appt 12/16/22    CONCLUSION:  1. Sinus rhythm. 2.  Cannot exclude an episode suspicious for rate controlled atrial  fibrillation that was self-limited, otherwise, no sustained arrhythmias. No pauses. Clinical correlation is recommended.            Ashlee Ferrer M.D.     D: 11/09/2022 7:11:39       T: 11/09/2022 11:56:31

## 2022-11-15 ENCOUNTER — HOSPITAL ENCOUNTER (OUTPATIENT)
Dept: PHYSICAL THERAPY | Age: 72
Setting detail: THERAPIES SERIES
Discharge: HOME | End: 2022-11-15
Payer: MEDICARE

## 2022-11-15 PROCEDURE — 97113 AQUATIC THERAPY/EXERCISES: CPT

## 2022-11-15 NOTE — PROGRESS NOTES
laps  x    Walk Sideways 3 laps  x           Lower Extremity Exercises:       Heel/Toe Raises 15  x With ab brace   Marches 15  x \"   Squats 15  x    3 Way Hip 15 ea  x \"   Hamstring Curls 15  x \", R only due to L post LE pain   Lunges: fwd and lat 12* ea  x    Step-Ups: fwd and lat 12 ea  x           Core Exercises:       Paddle board push and pull fwd and back 12*  x    Paddle board push down 12*  x           Lower Extremity Stretches:       HS stretch at bench 3x20 sec  x    Piriformis stretch with LE propped on bench* 3x20 sec  x           Seated Exercises:              Upper Extremity Exercises:       Shoulder Flexion* 10x  x With ab brace   Shoulder ABD/ADD       Shoulder Horizontal ABD/ADD       Shoulder IR/ER       Shoulder Circles       Shoulder Shrugs       Rows       Bicep Curls              Upper Extremity Stretches:              Balance:              Dynamic Gait:              Deep Water:    4'10\", staying close to steps   Hang 5 min  x    Bicycle 2 min  x    Hip ABD/ADD 2 min  x    Hip Flex/Ext 2 min  x      Specific Interventions Next Treatment: nustep warm up, core strengthening, hip strengthening, hip stretching and lumbar stretching    Activity/Treatment Tolerance:  [x]  Patient tolerated treatment well  []  Patient limited by fatigue  []  Patient limited by pain   []  Patient limited by medical complications  []  Other:     Assessment: Continued with aquatic therapy today. Progressions made as indicated by * which pt tolerated well. Is challenged with core activation without UE support. Continued to report 3/10 pain at end of session. GOALS:  Patient Goal: reduce pain, return to walking    Short Term Goals:  Time Frame: 3 weeks    1. Patient will report decrease in back and hip pain to 3/10 at most to promote ease of household tasks. 2. Patient will improve B hip strength to 4+/5 to improve ability to ambulate with a normalized gait pattern.   3. Patient will perform B SLR for 10 seconds to promote increased core strength needed for ease of standing tasks. 4. Patient will be indep with HEP in order to meet long term goals. Long Term Goals:  Time Frame: 6 wks    1. Patient will improve Modified Oswestry score from 20/50 to 12/50 to allow decrease in disability and improved functional mobility for improved overall QOL. 2. Patient will be indep with HEP in order to prevent re-injury and improve ability to perform functional mobility tasks. Patient Education:   [x]  HEP/Education Completed: continue HEP  [facility]: [email or web address]  []  No new Education completed  []  Reviewed Prior HEP      [x]  Patient verbalized and/or demonstrated understanding of education provided. []  Patient unable to verbalize and/or demonstrate understanding of education provided. Will continue education. []  Barriers to learning:     PLAN:  Treatment Recommendations: Strengthening, Range of Motion, Balance Training, Manual Therapy - Soft Tissue Mobilization, Manual Therapy - Joint Manipulation, Pain Management, Home Exercise Program, Patient Education, Safety Education and Training, Aquatics, and Modalities    []  Plan of care initiated. Plan to see patient 2 times per week for 6 weeks to address the treatment planned outlined above.  Will begin on land and transition to pool when heart monitor is removed  [x]  Continue with current plan of care  []  Modify plan of care as follows:    []  Hold pending physician visit  []  Discharge    Time In 1015   Time Out 1059   Timed Code Minutes: 44 min   Total Treatment Time: 44 min     Electronically Signed by: Sena Bond, PT [id number]

## 2022-11-17 ENCOUNTER — HOSPITAL ENCOUNTER (OUTPATIENT)
Dept: PHYSICAL THERAPY | Age: 72
Setting detail: THERAPIES SERIES
Discharge: HOME | End: 2022-11-17
Payer: MEDICARE

## 2022-11-17 PROCEDURE — 97113 AQUATIC THERAPY/EXERCISES: CPT

## 2022-11-17 PROCEDURE — 97530 THERAPEUTIC ACTIVITIES: CPT

## 2022-11-17 NOTE — PROGRESS NOTES
** PLEASE SIGN, DATE AND TIME CERTIFICATION BELOW AND RETURN TO [facility] OUTPATIENT REHABILITATION (FAX #: [phone]). ATTEST/CO-SIGN IF ACCESSING VIA  [employer]. THANK YOU.**    I certify that I have examined the patient below and determined that Physical Medicine and Rehabilitation service is necessary and that I approve the established plan of care for up to 90 days or as specifically noted. Attestation, signature or co-signature of physician indicates approval of certification requirements.    ________________________ ____________ __________  Physician Signature   Date   Time    [facility]  PHYSICAL THERAPY  [] EVALUATION  [] DAILY NOTE (LAND) [] DAILY NOTE (AQUATIC ) [x] PROGRESS NOTE [] DISCHARGE NOTE    [x] [address] [facility]   [] [place] [address]    [] [facility]   [] [facility]    Date: 2022  Patient Name:  Domonique Haskins  : 1950  MRN: [id number]  CSN: [id number]    Referring Practitioner Brittany Fleming, APR*   Diagnosis Lumbar back pain with radiculopathy affecting lower extremity [M54.16]    Treatment Diagnosis core and LE weakness, hip stiffness, impaired posture and impaired balance   Date of Evaluation 10/18/22    Additional Pertinent History HTN, DM , B TKA      Functional Outcome Measure Used Modified Oswestry   Functional Outcome Score 20/50 (10/18/22) 20/50 (22)      Insurance: Primary: Payor: Veronica Marquez /  /  / ,   Secondary:    Authorization Information: No Pre-cert required   Visit # 9, 0/10 for progress note   Visits Allowed: UNLIMITED VISITS BASED ON MEDICAL NECESSITY   Recertification Date: Feb 15, 2023   Physician Follow-Up: Nov 3, 2022   Physician Orders: Sekou Christie and treat   History of Present Illness: Pt is a 69 y/o female presenting to skilled PT services with c/o low back pain. Pt reports experiencing back pain for years of insidious onset.  Reports radicular pain into B LE, especially hips, within the last year. Pain is not constant but comes and goes. Pain increases with standing activity and walking, decreases with sitting. Denies n/t. No imaging completed. Takes motrin when leaving the home which does provide a little relief. SUBJECTIVE:  Patient reports 3-4/10 post B LE pain today. Pt does feel as though skilled PT services are helping. Reports aquatics are helping to reduce pain and she is more loose. Would like to continue with therapy.     AQUATICS TREATMENT   Precautions:    Pain: 3-4/10 back pain    X in shaded column indicates activity completed today   Exercise/Intervention Sets/Sec  Notes   Walk Forward 3 laps  x    Walk Backward 3 laps  x    Walk Sideways 3 laps  x           Lower Extremity Exercises:       Heel/Toe Raises 15  x With ab brace   Marches 15  x \"   Squats 15  x    3 Way Hip 15 ea  x \"   Hamstring Curls 15  x \", R only due to L post LE pain   Lunges: fwd and lat 12 ea  x    Step-Ups: fwd and lat 12 ea             Core Exercises:       Paddle board push and pull fwd and back 12  x    Paddle board push down 12  x           Lower Extremity Stretches:       HS stretch at bench 3x20 sec      Piriformis stretch with LE propped on bench 3x20 sec             Seated Exercises:              Upper Extremity Exercises:       Shoulder Flexion 10x  x With ab brace   Shoulder ABD/ADD       Shoulder Horizontal ABD/ADD       Shoulder IR/ER       Shoulder Circles       Shoulder Shrugs       Rows       Bicep Curls              Upper Extremity Stretches:              Balance:              Dynamic Gait:              Deep Water:    4'10\", staying close to steps   Hang 5 min  x    Bicycle 2 min      Hip ABD/ADD 2 min      Hip Flex/Ext 2 min        Specific Interventions Next Treatment: nustep warm up, core strengthening, hip strengthening, hip stretching and lumbar stretching    Activity/Treatment Tolerance:  [x]  Patient tolerated treatment well  []  Patient limited by fatigue  []  Patient limited by pain   []  Patient limited by medical complications  []  Other:     Assessment: Pt is making slow progress during her time in skilled PT services, meeting 1/4 STG and no LTG. She is making gains in LE strength, and reports reduced pain overall. She continues to score 20/50 of Modified Oswestry. She will benefit from continued skilled PT services to further increased strength, ROM, reduce pain to improve overall QOL and ability to perform functional mobility tasks. Continued with aquatic therapy today. No progressions today due to assessment for PN. Pt tolerated interventions well. Continued to report 3/10 pain at end of session. GOALS:  Patient Goal: reduce pain, return to walking    Short Term Goals:  Time Frame: 3 weeks    1. Patient will report decrease in back and hip pain to 3/10 at most to promote ease of household tasks. [] Goal Met [x] Goal Not Met [x] Continue Goal [] Discontinue Goal  [] Revise Goal  Goal Assessment: reports 6/10 pain on average, depending on activity    2. Patient will improve B hip strength to 4+/5 to improve ability to ambulate with a normalized gait pattern.  [] Goal Met [x] Goal Not Met [x] Continue Goal [] Discontinue Goal  [] Revise Goal  Goal Assessment: assessed in sitting due to being in pool. 4/5 hip flex, 4+/5 hip ABD    3. Patient will perform B SLR for 10 seconds to promote increased core strength needed for ease of standing tasks. [] Goal Met [x] Goal Not Met [x] Continue Goal [] Discontinue Goal  [] Revise Goal  Goal Assessment: not assessed due to being in pool and not being dressed to go into therapy gym      4. Patient will be indep with HEP in order to meet long term goals. [x] Goal Met [] Goal Not Met [] Continue Goal [x] Discontinue Goal  [] Revise Goal  Goal Assessment: reports good compliance with HEP      Long Term Goals:  Time Frame: 6 wks    1.  Patient will improve Modified Oswestry score from 20/50 to 12/50 to allow decrease in disability and improved functional mobility for improved overall QOL. [] Goal Met [x] Goal Not Met [x] Continue Goal [] Discontinue Goal  [] Revise Goal  Goal Assessment: 20/50 today    2. Patient will be indep with HEP in order to prevent re-injury and improve ability to perform functional mobility tasks. [] Goal Met [x] Goal Not Met [x] Continue Goal [] Discontinue Goal  [] Revise Goal      Patient Education:   [x]  HEP/Education Completed: continue HEP  [facility] Access Code: [email or web address]  []  No new Education completed  []  Reviewed Prior HEP      [x]  Patient verbalized and/or demonstrated understanding of education provided. []  Patient unable to verbalize and/or demonstrate understanding of education provided. Will continue education. []  Barriers to learning:     PLAN:  Treatment Recommendations: Strengthening, Range of Motion, Balance Training, Manual Therapy - Soft Tissue Mobilization, Manual Therapy - Joint Manipulation, Pain Management, Home Exercise Program, Patient Education, Safety Education and Training, Aquatics, and Modalities    []  Plan of care initiated. Plan to see patient 2 times per week for 6 weeks to address the treatment planned outlined above.    []  Continue with current plan of care  [x]  Modify plan of care as follows:  2x/wk for 4 additional weeks alternating land and pool  []  Hold pending physician visit  []  Discharge    Time In 1130   Time Out 1218   Timed Code Minutes: 48 min   Total Treatment Time: 48 min     Electronically Signed by: Ángel Billingsley, [place] [id number]

## 2022-11-22 ENCOUNTER — HOSPITAL ENCOUNTER (OUTPATIENT)
Dept: PHYSICAL THERAPY | Age: 72
Setting detail: THERAPIES SERIES
End: 2022-11-22
Payer: MEDICARE

## 2022-11-29 ENCOUNTER — APPOINTMENT (OUTPATIENT)
Dept: PHYSICAL THERAPY | Age: 72
End: 2022-11-29
Payer: MEDICARE

## 2022-11-30 DIAGNOSIS — N18.2 CKD (CHRONIC KIDNEY DISEASE), STAGE II: ICD-10-CM

## 2022-11-30 RX ORDER — NIFEDIPINE 90 MG/1
TABLET, FILM COATED, EXTENDED RELEASE ORAL
Qty: 180 TABLET | Refills: 3 | Status: SHIPPED | OUTPATIENT
Start: 2022-11-30

## 2022-11-30 RX ORDER — HYDRALAZINE HYDROCHLORIDE 50 MG/1
TABLET, FILM COATED ORAL
Qty: 180 TABLET | Refills: 3 | Status: SHIPPED | OUTPATIENT
Start: 2022-11-30

## 2022-11-30 RX ORDER — LOSARTAN POTASSIUM 50 MG/1
TABLET ORAL
Qty: 180 TABLET | Refills: 3 | Status: SHIPPED | OUTPATIENT
Start: 2022-11-30

## 2022-12-01 ENCOUNTER — HOSPITAL ENCOUNTER (OUTPATIENT)
Dept: PHYSICAL THERAPY | Age: 72
Setting detail: THERAPIES SERIES
Discharge: HOME | End: 2022-12-01
Payer: MEDICARE

## 2022-12-01 PROCEDURE — 97113 AQUATIC THERAPY/EXERCISES: CPT

## 2022-12-01 NOTE — PROGRESS NOTES
[address] [facility]  PHYSICAL THERAPY  [] EVALUATION  [] DAILY NOTE (LAND) [x] DAILY NOTE (AQUATIC ) [] PROGRESS NOTE [] DISCHARGE NOTE    [x] OUTPATIENT REHABILITATION CENTER [facility]   [] [place] [address]    [] [facility]   [] Jennifer Griffin    Date: 2022  Patient Name:  Rudy Menendez  : 1950  MRN: [id number]  CSN: [id number]    Referring Practitioner Beata Acevedo, APR*   Diagnosis Lumbar back pain with radiculopathy affecting lower extremity [M54.16]    Treatment Diagnosis core and LE weakness, hip stiffness, impaired posture and impaired balance   Date of Evaluation 10/18/22    Additional Pertinent History HTN, DM , B TKA      Functional Outcome Measure Used Modified Oswestry   Functional Outcome Score  (10/18/22)  (22)      Insurance: Primary: Payor: Alexander Butler /  /  / ,   Secondary:    Authorization Information: No Pre-cert required   Visit # 10, 1/10 for progress note   Visits Allowed: UNLIMITED VISITS BASED ON MEDICAL NECESSITY   Recertification Date: Feb 15, 2023   Physician Follow-Up: Nov 3, 2022   Physician Orders: Lupe Crew and treat   History of Present Illness: Pt is a 69 y/o female presenting to skilled PT services with c/o low back pain. Pt reports experiencing back pain for years of insidious onset. Reports radicular pain into B LE, especially hips, within the last year. Pain is not constant but comes and goes. Pain increases with standing activity and walking, decreases with sitting. Denies n/t. No imaging completed. Takes motrin when leaving the home which does provide a little relief. SUBJECTIVE: Patient reports that she feels better initially after completing aquatic therapy until she starts walking out to her car. She states that she is having 5/10 pain at her lower back and down her left leg to her calf region.        AQUATICS TREATMENT   Precautions:    Pain: 5/10 lower back pain down LLE to left calf region X in shaded column indicates activity completed today   Exercise/Intervention Sets/Sec  Notes   Walk Forward 3 laps  X    Walk Backward 3 laps  X    Walk Sideways 3 laps  X           Lower Extremity Exercises:       Heel/Toe Raises 15  X With ab brace   Marches 15  X \"   Squats 15  X    3 Way Hip 15 ea  X \"   Hamstring Curls 15  X \", R only due to L post LE pain   Lunges: fwd and lat 15* ea  X    Step-Ups: fwd and lat 12 ea             Core Exercises:       Paddle board push and pull fwd and back 15*  X    Paddle board push down 15*  X           Lower Extremity Stretches:       HS stretch at bench 3x20 sec      Piriformis stretch with LE propped on bench 3x20 sec             Seated Exercises:              Upper Extremity Exercises:       Shoulder Flexion 12*x  X With ab brace   Shoulder ABD/ADD       Shoulder Horizontal ABD/ADD       Shoulder IR/ER       Shoulder Circles       Shoulder Shrugs       Rows       Bicep Curls              Upper Extremity Stretches:              Balance:              Dynamic Gait:              Deep Water:    4'10\", staying close to steps   Hang 6* min  X    Bicycle 2 min  X    Hip ABD/ADD 2 min  X    Hip Flex/Ext 2 min  X      Specific Interventions Next Treatment: nustep warm up, core strengthening, hip strengthening, hip stretching and lumbar stretching    Activity/Treatment Tolerance:  [x]  Patient tolerated treatment well  []  Patient limited by fatigue  []  Patient limited by pain   []  Patient limited by medical complications  []  Other:     Assessment: Patient continued with aquatic therapy as documented above. Progression of reps indicated by * in the exercise grid above. She was provided with occasional cues for abdominal bracing while performing exercises. She tolerated session well. She reported that her pain decreased with deep water exercises. GOALS:  Patient Goal: reduce pain, return to walking    Short Term Goals:  Time Frame: 3 weeks    1.  Patient will report decrease in back and hip pain to 3/10 at most to promote ease of household tasks. 2. Patient will improve B hip strength to 4+/5 to improve ability to ambulate with a normalized gait pattern. 3. Patient will perform B SLR for 10 seconds to promote increased core strength needed for ease of standing tasks. Long Term Goals:  Time Frame: 6 wks    1. Patient will improve Modified Oswestry score from 20/50 to 12/50 to allow decrease in disability and improved functional mobility for improved overall QOL. 2. Patient will be indep with HEP in order to prevent re-injury and improve ability to perform functional mobility tasks. Patient Education:   [x]  HEP/Education Completed: Monitor response to progression of reps. [facility] Access Code: [email or web address]  []  No new Education completed  []  Reviewed Prior HEP      [x]  Patient verbalized and/or demonstrated understanding of education provided. []  Patient unable to verbalize and/or demonstrate understanding of education provided. Will continue education. []  Barriers to learning:     PLAN:  Treatment Recommendations: Strengthening, Range of Motion, Balance Training, Manual Therapy - Soft Tissue Mobilization, Manual Therapy - Joint Manipulation, Pain Management, Home Exercise Program, Patient Education, Safety Education and Training, Aquatics, and Modalities    []  Plan of care initiated. Plan to see patient 2 times per week for 6 weeks to address the treatment planned outlined above.    []  Continue with current plan of care  [x]  Modify plan of care as follows:  2x/wk for 4 additional weeks alternating land and pool  []  Hold pending physician visit  []  Discharge    Time In 1000   Time Out 1041   Timed Code Minutes:  41 min   Total Treatment Time:  41 min     Electronically Signed by: Юлия Tamez PTA

## 2022-12-06 ENCOUNTER — HOSPITAL ENCOUNTER (OUTPATIENT)
Dept: PHYSICAL THERAPY | Age: 72
Setting detail: THERAPIES SERIES
Discharge: HOME | End: 2022-12-06
Payer: MEDICARE

## 2022-12-06 PROCEDURE — 97110 THERAPEUTIC EXERCISES: CPT

## 2022-12-06 NOTE — PROGRESS NOTES
[address] [facility]  PHYSICAL THERAPY  [] EVALUATION  [x] DAILY NOTE (LAND) [] DAILY NOTE (AQUATIC ) [] PROGRESS NOTE [] DISCHARGE NOTE    [x] OUTPATIENT REHABILITATION CENTER [facility]   [] [place] [address]    [] [facility]   [] Sho Corona    Date: 2022  Patient Name:  Duaine Heimlich  : 1950  MRN: [id number]  CSN: [id number]    Referring Practitioner Osvaldo Whatley, APR*   Diagnosis Lumbar back pain with radiculopathy affecting lower extremity [M54.16]    Treatment Diagnosis core and LE weakness, hip stiffness, impaired posture and impaired balance   Date of Evaluation 10/18/22    Additional Pertinent History HTN, DM , B TKA      Functional Outcome Measure Used Modified Oswestry   Functional Outcome Score  (10/18/22)  (22)      Insurance: Primary: Payor: Glo Garcia /  /  / ,   Secondary:    Authorization Information: No Pre-cert required   Visit # 11, 2/10 for progress note   Visits Allowed: UNLIMITED VISITS BASED ON MEDICAL NECESSITY   Recertification Date: Feb 15, 2023   Physician Follow-Up: Nov 3, 2022   Physician Orders: Alexy Vicente and treat   History of Present Illness: Pt is a 71 y/o female presenting to skilled PT services with c/o low back pain. Pt reports experiencing back pain for years of insidious onset. Reports radicular pain into B LE, especially hips, within the last year. Pain is not constant but comes and goes. Pain increases with standing activity and walking, decreases with sitting. Denies n/t. No imaging completed. Takes motrin when leaving the home which does provide a little relief. SUBJECTIVE:  Patient reports 4/10 post LE pain today. States pain comes and goes. No other new complaints.       TREATMENT   Precautions:    Pain: 4/10 post LE    \"X in shaded column indicates activity completed today    *\" next to exercise/intervention indicates progression   Modalities Parameters/  Location  Notes Manual Therapy Time/Technique  Notes                     Exercise/Intervention   Notes   NuStep seat 10 arms 9 5 min L 3 x    Mat:       TA contraction 10x 5 sec x    TA + march 10x  x    TA with ball 10x  x    TA with band 10x orange     TA with leg extension 5x 2 sec x Cues for ab brace   TA with alternating UE and LE 10x   Cues for ab brace   Bent knee fallouts: unilat and bilat* 10x  x           SKTC with towel 3x 30 sec x    Piriformis stretch 3x 15 sec  Cramping in groin today   LTR 5x 5 sec x    HS stretch 3x 15 sec x    Standing:       HR/TR, marches, HS curls, mini squat* 10x  x With ab brace   3 way hip* 10x  x With ab brace   HS stretch on step*  3x 20 sec x      Specific Interventions Next Treatment: nustep warm up, core strengthening, hip strengthening, hip stretching and lumbar stretching    Activity/Treatment Tolerance:  [x]  Patient tolerated treatment well  []  Patient limited by fatigue  []  Patient limited by pain   []  Patient limited by medical complications  []  Other:     Assessment: Resumed land therapy since first starting PT. Pt tolerated fairly well. Requires cues for ab brace throughout interventions. Continued to report 4/10 pain in post LE at end of session. GOALS:  Patient Goal: reduce pain, return to walking    Short Term Goals:  Time Frame: 3 weeks    1. Patient will report decrease in back and hip pain to 3/10 at most to promote ease of household tasks. 2. Patient will improve B hip strength to 4+/5 to improve ability to ambulate with a normalized gait pattern. 3. Patient will perform B SLR for 10 seconds to promote increased core strength needed for ease of standing tasks. 4. Patient will be indep with HEP in order to meet long term goals. Long Term Goals:  Time Frame: 6 wks    1. Patient will improve Modified Oswestry score from 20/50 to 12/50 to allow decrease in disability and improved functional mobility for improved overall QOL.   2. Patient will be indep with HEP in order to prevent re-injury and improve ability to perform functional mobility tasks. Patient Education:   [x]  HEP/Education Completed: continue HEP  [facility]: [email or web address]  []  No new Education completed  []  Reviewed Prior HEP      [x]  Patient verbalized and/or demonstrated understanding of education provided. []  Patient unable to verbalize and/or demonstrate understanding of education provided. Will continue education. []  Barriers to learning:     PLAN:  Treatment Recommendations: Strengthening, Range of Motion, Balance Training, Manual Therapy - Soft Tissue Mobilization, Manual Therapy - Joint Manipulation, Pain Management, Home Exercise Program, Patient Education, Safety Education and Training, Aquatics, and Modalities    []  Plan of care initiated. Plan to see patient 2 times per week for 6 weeks to address the treatment planned outlined above.  Will begin on land and transition to pool when heart monitor is removed  [x]  Continue with current plan of care  []  Modify plan of care as follows:    []  Hold pending physician visit  []  Discharge    Time In 1017   Time Out 1157   Timed Code Minutes: 40 min   Total Treatment Time: 40 min     Electronically Signed by: Jp Harrington, [place] [id number]

## 2022-12-08 ENCOUNTER — HOSPITAL ENCOUNTER (OUTPATIENT)
Dept: PHYSICAL THERAPY | Age: 72
Setting detail: THERAPIES SERIES
Discharge: HOME | End: 2022-12-08
Payer: MEDICARE

## 2022-12-08 PROCEDURE — 97113 AQUATIC THERAPY/EXERCISES: CPT

## 2022-12-08 NOTE — PROGRESS NOTES
[address] [facility]  PHYSICAL THERAPY  [] EVALUATION  [] DAILY NOTE (LAND) [x] DAILY NOTE (AQUATIC ) [] PROGRESS NOTE [] DISCHARGE NOTE    [x] OUTPATIENT REHABILITATION [facility]   [] [place] [address]    [] [facility]   [] Mya Albarran    Date: 2022  Patient Name:  Jacky Simon  : 1950  MRN: [id number]  CSN: [id number]    Referring Practitioner Usman Brown, APR*   Diagnosis Lumbar back pain with radiculopathy affecting lower extremity [M54.16]    Treatment Diagnosis core and LE weakness, hip stiffness, impaired posture and impaired balance   Date of Evaluation 10/18/22    Additional Pertinent History HTN, DM , B TKA      Functional Outcome Measure Used Modified Oswestry   Functional Outcome Score  (10/18/22)  (22)      Insurance: Primary: Payor: Maurene Homans /  /  / ,   Secondary:    Authorization Information: No Pre-cert required   Visit # 12, 3/10 for progress note   Visits Allowed: UNLIMITED VISITS BASED ON MEDICAL NECESSITY   Recertification Date: Feb 15, 2023   Physician Follow-Up: Nov 3, 2022   Physician Orders: Norrine Crimes and treat   History of Present Illness: Pt is a 71 y/o female presenting to skilled PT services with c/o low back pain. Pt reports experiencing back pain for years of insidious onset. Reports radicular pain into B LE, especially hips, within the last year. Pain is not constant but comes and goes. Pain increases with standing activity and walking, decreases with sitting. Denies n/t. No imaging completed. Takes motrin when leaving the home which does provide a little relief. SUBJECTIVE:  Patient states she is sore today and rates pain 5/10 along LE. States the pain comes and goes but feels the water has helped overall.       AQUATICS TREATMENT   Precautions:    Pain: 5/10 lower back pain down LLE to left calf region    X in shaded column indicates activity completed today   Exercise/Intervention Sets/Sec Notes   Walk Forward 3 laps  X    Walk Backward 3 laps  X    Walk Sideways 3 laps  X           Lower Extremity Exercises:       Heel/Toe Raises 15  X With ab brace   Marches 15  X \"   Squats 15  X    3 Way Hip 15 ea  X \"   Hamstring Curls 15  X \" R only due to L post LE pain   Lunges: fwd and lat 15 ea  X    Step-Ups: fwd and lat 12 ea             Core Exercises:       Paddle board push and pull fwd and back 15  X    Paddle board push down 15  X           Lower Extremity Stretches:       HS stretch at bench 3x20 sec      Piriformis stretch with LE propped on bench 3x20 sec             Seated Exercises:              Upper Extremity Exercises:       Shoulder Flexion 12x  X With ab brace   Shoulder ABD/ADD* 12x  x    Shoulder Horizontal ABD/ADD* 12x  x    Shoulder IR/ER       Shoulder Circles       Shoulder Shrugs       Rows       Bicep Curls              Upper Extremity Stretches:              Balance:              Dynamic Gait:              Deep Water:    4'10\", staying close to steps   Hang 6 min  X    Bicycle 2 min  X    Hip ABD/ADD 2 min  X    Hip Flex/Ext 2 min  X      Specific Interventions Next Treatment: nustep warm up, core strengthening, hip strengthening, hip stretching and lumbar stretching    Activity/Treatment Tolerance:  [x]  Patient tolerated treatment well  []  Patient limited by fatigue  []  Patient limited by pain   []  Patient limited by medical complications  []  Other:     Assessment: Patient completed aquatics as listed above working on flexibility, LE strength and core stability. Provided cues for correct technique with exercises. Good tolerance with additions. Will continue to progress as tolerated by patient per POC. GOALS:  Patient Goal: reduce pain, return to walking    Short Term Goals:  Time Frame: 3 weeks    1. Patient will report decrease in back and hip pain to 3/10 at most to promote ease of household tasks.     2. Patient will improve B hip strength to 4+/5 to improve ability to ambulate with a normalized gait pattern. 3. Patient will perform B SLR for 10 seconds to promote increased core strength needed for ease of standing tasks. Long Term Goals:  Time Frame: 6 wks    1. Patient will improve Modified Oswestry score from 20/50 to 12/50 to allow decrease in disability and improved functional mobility for improved overall QOL. 2. Patient will be indep with HEP in order to prevent re-injury and improve ability to perform functional mobility tasks. Patient Education:   [x]  HEP/Education Completed: Monitor response to progression of reps. [employer] Access Code: [email or web address]  []  No new Education completed  []  Reviewed Prior HEP      [x]  Patient verbalized and/or demonstrated understanding of education provided. []  Patient unable to verbalize and/or demonstrate understanding of education provided. Will continue education. []  Barriers to learning:     PLAN:  Treatment Recommendations: Strengthening, Range of Motion, Balance Training, Manual Therapy - Soft Tissue Mobilization, Manual Therapy - Joint Manipulation, Pain Management, Home Exercise Program, Patient Education, Safety Education and Training, Aquatics, and Modalities    []  Plan of care initiated. Plan to see patient 2 times per week for 6 weeks to address the treatment planned outlined above.    []  Continue with current plan of care  [x]  Modify plan of care as follows:  2x/wk for 4 additional weeks alternating land and pool  []  Hold pending physician visit  []  Discharge    Time In 1130   Time Out 1210   Timed Code Minutes:  40 min   Total Treatment Time:  40 min     Electronically Signed by: Luh Romero PTA

## 2022-12-13 ENCOUNTER — HOSPITAL ENCOUNTER (OUTPATIENT)
Dept: PHYSICAL THERAPY | Age: 72
Setting detail: THERAPIES SERIES
Discharge: HOME | End: 2022-12-13
Payer: MEDICARE

## 2022-12-13 PROCEDURE — 97110 THERAPEUTIC EXERCISES: CPT

## 2022-12-13 NOTE — PROGRESS NOTES
[address] [facility]  PHYSICAL THERAPY  [] EVALUATION  [x] DAILY NOTE (LAND) [] DAILY NOTE (AQUATIC ) [] PROGRESS NOTE [] DISCHARGE NOTE    [x] OUTPATIENT REHABILITATION [facility]   [] [place] [address]    [] [facility]   [] Jennifer Griffin    Date: 2022  Patient Name:  Rudy Menendez  : 1950  MRN: [id number]  CSN: [id number]    Referring Practitioner Beata Acevedo, APR*   Diagnosis Lumbar back pain with radiculopathy affecting lower extremity [M54.16]    Treatment Diagnosis core and LE weakness, hip stiffness, impaired posture and impaired balance   Date of Evaluation 10/18/22    Additional Pertinent History HTN, DM , B TKA      Functional Outcome Measure Used Modified Oswestry   Functional Outcome Score  (10/18/22)  (22)      Insurance: Primary: Payor: Alexander Butler /  /  / ,   Secondary:    Authorization Information: No Pre-cert required   Visit # 13, 4/10 for progress note   Visits Allowed: UNLIMITED VISITS BASED ON MEDICAL NECESSITY   Recertification Date: Feb 15, 2023   Physician Follow-Up: Nov 3, 2022    Physician Orders: Lupe Crew and treat   History of Present Illness: Pt is a 71 y/o female presenting to skilled PT services with c/o low back pain. Pt reports experiencing back pain for years of insidious onset. Reports radicular pain into B LE, especially hips, within the last year. Pain is not constant but comes and goes. Pain increases with standing activity and walking, decreases with sitting. Denies n/t. No imaging completed. Takes motrin when leaving the home which does provide a little relief. SUBJECTIVE:  Patient feels like she still has a lot of LBP that radiates to the outer hips and down the posterior thighs, occasional calf cramping. Still has difficulty with standing or walking long distances.        TREATMENT   Precautions:    Pain: 4/10 post LE    \"X in shaded column indicates activity completed today    *\" next to exercise/intervention indicates progression   Modalities Parameters/  Location  Notes                     Manual Therapy Time/Technique  Notes                     Exercise/Intervention   Notes   NuStep seat 10 arms 9 5 min L 3 x           Mat:       TA contraction with posterior tilt 10x 5 sec x Cues for technique with bracing   TA + march 10x  x    TA with ball 10x      TA with band 10x orange     TA with leg extension 5x 2 sec  Cues for ab brace   TA with alternating UE and LE 10x   Cues for ab brace   Bent knee fallouts: unilat and bilat 10x             SKTC with towel 3x 30 sec x    Piriformis stretch 3x 15 sec x Groin pain last session - cues for SEBASTIAN stretch avoiding adduction for impingement. Used towel over top of knee to pull    LTR 5x 5 sec x    HS stretch 3x 15 sec x    Sidelying IT band stretch - leg in front of patient off edge 3x 30 sec x    Sidelying clamshell  10x  x           Standing:       HR/TR, marches, HS curls, mini squat 10x   With ab brace   3 way hip 10x   With ab brace   HS stretch on step 3x 20 sec       Specific Interventions Next Treatment: nustep warm up, core strengthening, hip strengthening, hip stretching and lumbar stretching    Activity/Treatment Tolerance:  [x]  Patient tolerated treatment well  []  Patient limited by fatigue  []  Patient limited by pain   []  Patient limited by medical complications  []  Other:     Assessment: Land session today, patient demonstrates good hamstring mobility and knee to chest, full LTR noted, however very tight piriformis/ IT band producing lateral hip pain and buttock pain. Given a variation of SEBASTIAN stretch to avoid groin pain/ impingement. GOALS:  Patient Goal: reduce pain, return to walking    Short Term Goals:  Time Frame: 3 weeks    1. Patient will report decrease in back and hip pain to 3/10 at most to promote ease of household tasks.   2. Patient will improve B hip strength to 4+/5 to improve ability to ambulate with a normalized gait pattern. 3. Patient will perform B SLR for 10 seconds to promote increased core strength needed for ease of standing tasks. 4. Patient will be indep with HEP in order to meet long term goals. Long Term Goals:  Time Frame: 6 wks    1. Patient will improve Modified Oswestry score from 20/50 to 12/50 to allow decrease in disability and improved functional mobility for improved overall QOL. 2. Patient will be indep with HEP in order to prevent re-injury and improve ability to perform functional mobility tasks. Patient Education:   [x]  HEP/Education Completed: continue HEP, updated handout to add IT band stretch and clamshell  [employer] Access Code: [email or web address]  []  No new Education completed  []  Reviewed Prior HEP      [x]  Patient verbalized and/or demonstrated understanding of education provided. []  Patient unable to verbalize and/or demonstrate understanding of education provided. Will continue education. []  Barriers to learning:     PLAN:  Treatment Recommendations: Strengthening, Range of Motion, Balance Training, Manual Therapy - Soft Tissue Mobilization, Manual Therapy - Joint Manipulation, Pain Management, Home Exercise Program, Patient Education, Safety Education and Training, Aquatics, and Modalities    []  Plan of care initiated. Plan to see patient 2 times per week for 6 weeks to address the treatment planned outlined above.  Will begin on land and transition to pool when heart monitor is removed  [x]  Continue with current plan of care  []  Modify plan of care as follows:    []  Hold pending physician visit  []  Discharge    Time In 1125   Time Out 1200   Timed Code Minutes: 35   Total Treatment Time: 35     Electronically Signed by: Oral Hernandez, PT [id number]

## 2022-12-15 ENCOUNTER — HOSPITAL ENCOUNTER (OUTPATIENT)
Dept: PHYSICAL THERAPY | Age: 72
Setting detail: THERAPIES SERIES
Discharge: HOME | End: 2022-12-15
Payer: MEDICARE

## 2022-12-15 PROCEDURE — 97113 AQUATIC THERAPY/EXERCISES: CPT

## 2022-12-15 NOTE — PROGRESS NOTES
[address] [facility]  PHYSICAL THERAPY  [] EVALUATION  [] DAILY NOTE (LAND) [x] DAILY NOTE (AQUATIC ) [] PROGRESS NOTE [] DISCHARGE NOTE    [x] OUTPATIENT REHABILITATION CENTER [facility]   [] [place] [address]    [] [facility]   [] Aidan Montilla    Date: 12/15/2022  Patient Name:  Alessandra Escalante  : 1950  MRN: [id number]  CSN: [id number]    Referring Practitioner Gerry Ramos APR*   Diagnosis Lumbar back pain with radiculopathy affecting lower extremity [M54.16]    Treatment Diagnosis core and LE weakness, hip stiffness, impaired posture and impaired balance   Date of Evaluation 10/18/22    Additional Pertinent History HTN, DM , B TKA      Functional Outcome Measure Used Modified Oswestry   Functional Outcome Score  (10/18/22)  (22)      Insurance: Primary: Payor: Jacinta Jacobsen /  /  / ,   Secondary:    Authorization Information: No Pre-cert required   Visit # 14, 5/10 for progress note   Visits Allowed: UNLIMITED VISITS BASED ON MEDICAL NECESSITY   Recertification Date: Feb 15, 2023   Physician Follow-Up: Nov 3, 2022   Physician Orders: Amalia Faulkner and treat   History of Present Illness: Pt is a 69 y/o female presenting to skilled PT services with c/o low back pain. Pt reports experiencing back pain for years of insidious onset. Reports radicular pain into B LE, especially hips, within the last year. Pain is not constant but comes and goes. Pain increases with standing activity and walking, decreases with sitting. Denies n/t. No imaging completed. Takes motrin when leaving the home which does provide a little relief. SUBJECTIVE:  Patient states she is feeling better today and rates current pain 4/10. Reports exercises are going well at home.        AQUATICS TREATMENT   Precautions:    Pain: 4/10 lower back pain down LLE to left calf region    X in shaded column indicates activity completed today   Exercise/Intervention Sets/Sec  Notes   Walk Forward 3 laps  X    Walk Backward 3 laps  X    Walk Sideways 3 laps  X           Lower Extremity Exercises:       Heel/Toe Raises 15  X With ab brace   Marches 15  X \"   Squats 15  X    3 Way Hip 15 ea  X \"   Hamstring Curls 15  X \" R only due to L post LE pain   Lunges: fwd and lat 15 ea  X    Step-Ups: fwd and lat 12 ea  x           Core Exercises:       Paddle board push and pull fwd and back 15  X    Paddle board push down 15  X           Lower Extremity Stretches:       HS stretch at bench 3x20 sec      Piriformis stretch with LE propped on bench 3x20 sec             Seated Exercises:              Upper Extremity Exercises:       Shoulder Flexion 15*x  X With ab brace   Shoulder ABD/ADD 15*x  x    Shoulder Horizontal ABD/ADD* 15*x  x    Shoulder IR/ER       Shoulder Circles       Shoulder Shrugs       Rows       Bicep Curls              Upper Extremity Stretches:              Balance:              Dynamic Gait:              Deep Water:    4'10\", staying close to steps   Hang 6 min  X    Bicycle 2 min  X    Hip ABD/ADD 2 min  X    Hip Flex/Ext 2 min  X      Specific Interventions Next Treatment: nustep warm up, core strengthening, hip strengthening, hip stretching and lumbar stretching    Activity/Treatment Tolerance:  [x]  Patient tolerated treatment well  []  Patient limited by fatigue  []  Patient limited by pain   []  Patient limited by medical complications  []  Other:     Assessment: Patient completed aquatics as listed above working on flexibility, LE strength and core stability. Provided cues for correct technique with exercises. Good tolerance with additions. Will continue to progress as tolerated by patient per POC. GOALS:  Patient Goal: reduce pain, return to walking    Short Term Goals:  Time Frame: 3 weeks    1. Patient will report decrease in back and hip pain to 3/10 at most to promote ease of household tasks.     2. Patient will improve B hip strength to 4+/5 to improve ability to ambulate with a normalized gait pattern. 3. Patient will perform B SLR for 10 seconds to promote increased core strength needed for ease of standing tasks. Long Term Goals:  Time Frame: 6 wks    1. Patient will improve Modified Oswestry score from 20/50 to 12/50 to allow decrease in disability and improved functional mobility for improved overall QOL. 2. Patient will be indep with HEP in order to prevent re-injury and improve ability to perform functional mobility tasks. Patient Education:   [x]  HEP/Education Completed: Monitor response to progression of reps. [place] Access Code: [email or web address]  []  No new Education completed  []  Reviewed Prior HEP      [x]  Patient verbalized and/or demonstrated understanding of education provided. []  Patient unable to verbalize and/or demonstrate understanding of education provided. Will continue education. []  Barriers to learning:     PLAN:  Treatment Recommendations: Strengthening, Range of Motion, Balance Training, Manual Therapy - Soft Tissue Mobilization, Manual Therapy - Joint Manipulation, Pain Management, Home Exercise Program, Patient Education, Safety Education and Training, Aquatics, and Modalities    []  Plan of care initiated. Plan to see patient 2 times per week for 6 weeks to address the treatment planned outlined above.    []  Continue with current plan of care  [x]  Modify plan of care as follows:  2x/wk for 4 additional weeks alternating land and pool  []  Hold pending physician visit  []  Discharge    Time In 1130   Time Out 1210   Timed Code Minutes:  40 min   Total Treatment Time:  40 min     Electronically Signed by: Ciarra Starr PTA

## 2022-12-16 ENCOUNTER — OFFICE VISIT (OUTPATIENT)
Dept: CARDIOLOGY CLINIC | Age: 72
End: 2022-12-16

## 2022-12-16 VITALS
SYSTOLIC BLOOD PRESSURE: 118 MMHG | HEART RATE: 68 BPM | WEIGHT: 257 LBS | HEIGHT: 62 IN | DIASTOLIC BLOOD PRESSURE: 70 MMHG | BODY MASS INDEX: 47.29 KG/M2

## 2022-12-16 DIAGNOSIS — Z09 FOLLOW-UP EXAM: Primary | ICD-10-CM

## 2022-12-16 NOTE — PROGRESS NOTES
[address] [facility] [address]  [place] [address]  Dept: [phone]  Dept Fax: [phone]  Loc: [phone]    Visit Date: 12/16/2022    Ms. Eugene Ramos is a 70 y.o. female  who presented for:  Chief Complaint   Patient presents with    Doreen Crenshaw monitor, stress and echo     Shortness of Breath       HPI:   71 yo F DM, HTN, HLD, TIA is referred for abnormal EKG. Patient also reports some vague chest discomfort. The chest pains was more on the right side, soreness, maybe worsened with deep breathing, associated with shortness of breath. Had [address]  one month ago. EKG shows SR, NSST. HbA1c\" 6.6. She is on Aspirin, statin, metoprolol, losartan, hctz, nifedipine. Comes to follow up          Current Outpatient Medications:     NIFEdipine (ADALAT CC) 90 MG extended release tablet, TAKE 1 TABLET TWICE A DAY, Disp: 180 tablet, Rfl: 3    hydrALAZINE (APRESOLINE) 50 MG tablet, TAKE 1 TABLET TWICE A DAY, Disp: 180 tablet, Rfl: 3    losartan (COZAAR) 50 MG tablet, TAKE 1 TABLET TWICE A DAY, Disp: 180 tablet, Rfl: 3    dapagliflozin (FARXIGA) 10 MG tablet, TAKE 1 TABLET EVERY MORNING, Disp: 90 tablet, Rfl: 1    metFORMIN (GLUCOPHAGE) 850 MG tablet, TAKE 1 TABLET TWICE A DAY WITH MEALS, Disp: 180 tablet, Rfl: 3    hydroCHLOROthiazide (HYDRODIURIL) 25 MG tablet, TAKE 1 TABLET DAILY, Disp: 90 tablet, Rfl: 3    atorvastatin (LIPITOR) 80 MG tablet, TAKE 1 TABLET DAILY, Disp: 90 tablet, Rfl: 3    metoprolol (LOPRESSOR) 100 MG tablet, TAKE 1 TABLET TWICE A DAY, Disp: 180 tablet, Rfl: 3    Cyanocobalamin (B-12 PO), Take by mouth, Disp: , Rfl:     Cholecalciferol (VITAMIN D3 PO), Take by mouth, Disp: , Rfl:     OLIVE LEAF EXTRACT PO, Take by mouth, Disp: , Rfl:     ELDERBERRY PO, Take by mouth, Disp: , Rfl:     blood glucose test strips (ACCU-CHEK ACTIVE STRIPS) strip, 1 each by In Vitro route 4 times daily As needed. , Disp: 200 each, Rfl: 3 INSULIN SYRINGE 1CC/29G 29G X 1/2\" 1 ML MISC, 1 each by Does not apply route daily, Disp: 30 each, Rfl: 0    Needle, Disp, (HYPODERMIC NEEDLE 51JW0-1/4\") 27G X 1-1/4\" MISC, 1 Units by Does not apply route daily, Disp: 30 each, Rfl: 5    aspirin 81 MG tablet, Take 81 mg by mouth daily, Disp: , Rfl:     Accu-Chek Multiclix Lancets MISC, 1 Units by Does not apply route 4 times daily, Disp: 204 each, Rfl: 1    Past Medical History  Jenna Valverde  has a past medical history of Hyperlipidemia, Hypertension, TIA (transient ischemic attack), and Type II or unspecified type diabetes mellitus without mention of complication, not stated as uncontrolled. Social History  Jenna Valverde  reports that she has never smoked. She has never used smokeless tobacco. She reports that she does not drink alcohol and does not use drugs. Family History  Jenna Valverde family history includes Breast Cancer (age of onset: 54) in her paternal cousin; Breast Cancer (age of onset: 66) in her paternal cousin; Cancer in her father; Diabetes in her sister, sister, sister, and sister; Heart Disease in her brother, sister, sister, and sister; High Blood Pressure in her mother, sister, and sister; Kidney Disease in her mother; No Known Problems in her sister and sister. Past Surgical History   Past Surgical History:   Procedure Laterality Date    TOTAL KNEE ARTHROPLASTY Bilateral 11/13/2017    [address] [facility] [address] [facility]       Subjective:     REVIEW OF SYSTEMS  Constitutional: denies sweats, chills and fever  HENT: denies  congestion, sinus pressure, sneezing and sore throat. Eyes: denies  pain, discharge, redness and itching. Respiratory: denies apnea, cough  Gastrointestinal: denies blood in stool, constipation, diarrhea   Endocrine: denies cold intolerance, heat intolerance, polydipsia. Genitourinary: denies dysuria, enuresis, flank pain and hematuria. Musculoskeletal: denies arthralgias, joint swelling and neck pain. Neurological: denies numbness and headaches. Psychiatric/Behavioral: denies agitation, confusion, decreased concentration and dysphoric mood    All others reviewed and are negative. Objective:     /70   Pulse 68   Ht 5' 2\" (1.575 m)   Wt 257 lb (116.6 kg)   BMI 47.01 kg/m²     Wt Readings from Last 3 Encounters:   12/16/22 257 lb (116.6 kg)   11/03/22 260 lb 9.6 oz (118.2 kg)   10/17/22 260 lb (117.9 kg)     BP Readings from Last 3 Encounters:   12/16/22 118/70   11/03/22 116/76   10/11/22 105/63       PHYSICAL EXAM  Constitutional: Oriented to person, place, and time. Appears well-developed and well-nourished. HENT:   Head: Normocephalic and atraumatic. Eyes: EOM are normal. Pupils are equal, round, and reactive to light. Neck: Normal range of motion. Neck supple. No JVD present. Cardiovascular: Normal rate , normal heart sounds and intact distal pulses. Pulmonary/Chest: Effort normal and breath sounds normal. No respiratory distress. No wheezes. No rales. Abdominal: Soft. Bowel sounds are normal. No distension. There is no tenderness. Musculoskeletal: Normal range of motion. No edema. Neurological: Alert and oriented to person, place, and time. No cranial nerve deficit. Coordination normal.   Skin: Skin is warm and dry. Psychiatric: Normal mood and affect.        No results found for: CKTOTAL, CKMB, CKMBINDEX    Lab Results   Component Value Date/Time    WBC 7.6 09/27/2022 01:14 PM    RBC 4.68 09/27/2022 01:14 PM    HGB 14.4 09/27/2022 01:14 PM    HCT 45.0 09/27/2022 01:14 PM    MCV 96.2 09/27/2022 01:14 PM    MCH 30.8 09/27/2022 01:14 PM    MCHC 32.0 09/27/2022 01:14 PM    RDW 15.0 11/07/2017 02:59 PM     09/27/2022 01:14 PM    MPV 10.2 09/27/2022 01:14 PM       Lab Results   Component Value Date/Time     09/27/2022 01:14 PM    K 4.2 09/27/2022 01:14 PM     09/27/2022 01:14 PM    CO2 24 09/27/2022 01:14 PM    BUN 14 09/27/2022 01:14 PM    LABALBU 3.6 09/27/2022

## 2022-12-16 NOTE — PROGRESS NOTES
Pt here for 6 week fu stress, echo, monitor    Pt states med list is correct from last appt 11/4/22    Pt continues with heart palpitations, swelling

## 2022-12-20 ENCOUNTER — HOSPITAL ENCOUNTER (OUTPATIENT)
Dept: PHYSICAL THERAPY | Age: 72
Setting detail: THERAPIES SERIES
Discharge: HOME | End: 2022-12-20
Payer: MEDICARE

## 2022-12-20 PROCEDURE — 97110 THERAPEUTIC EXERCISES: CPT

## 2022-12-20 NOTE — PROGRESS NOTES
[address] [facility]  PHYSICAL THERAPY  [] EVALUATION  [x] DAILY NOTE (LAND) [] DAILY NOTE (AQUATIC ) [] PROGRESS NOTE [] DISCHARGE NOTE    [x] OUTPATIENT REHABILITATION CENTER [facility]   [] [place] [address]    [] [facility]   [] Aidan Montilla    Date: 2022  Patient Name:  Alessandra Escalante  : 1950  MRN: [id number]  CSN: [id number]    Referring Practitioner Gerry Ramos, APR*   Diagnosis Lumbar back pain with radiculopathy affecting lower extremity [M54.16]    Treatment Diagnosis core and LE weakness, hip stiffness, impaired posture and impaired balance   Date of Evaluation 10/18/22    Additional Pertinent History HTN, DM , B TKA      Functional Outcome Measure Used Modified Oswestry   Functional Outcome Score  (10/18/22)  (22)      Insurance: Primary: Payor: Jacinta Jacobsen /  /  / ,   Secondary:    Authorization Information: No Pre-cert required   Visit # 15, 6/10 for progress note   Visits Allowed: UNLIMITED VISITS BASED ON MEDICAL NECESSITY   Recertification Date: Feb 15, 2023   Physician Follow-Up: Nov 3, 2022    Physician Orders: Amalia Faulkner and treat   History of Present Illness: Pt is a 69 y/o female presenting to skilled PT services with c/o low back pain. Pt reports experiencing back pain for years of insidious onset. Reports radicular pain into B LE, especially hips, within the last year. Pain is not constant but comes and goes. Pain increases with standing activity and walking, decreases with sitting. Denies n/t. No imaging completed. Takes motrin when leaving the home which does provide a little relief. SUBJECTIVE:  Patient feels as though she is starting to see improvement in mobility. Feels more loose. Reports 4/10 B post LE pain today. Notices more pain when up and moving.       TREATMENT   Precautions:    Pain: 4/10 post LE    \"X in shaded column indicates activity completed today    *\" next to exercise/intervention indicates progression   Modalities Parameters/  Location  Notes                     Manual Therapy Time/Technique  Notes                     Exercise/Intervention   Notes   NuStep seat 10 arms 9 6* min L 3 x           Mat:       TA contraction with posterior tilt 15*x 5 sec x Cues for technique with bracing   TA + march 10x      TA with ball 10x      TA with band 10x orange     TA with leg extension 5x2* 2 sec x Cues for ab brace   TA with alternating UE and LE 10x   Cues for ab brace   Bent knee fallouts: unilat and bilat 10x             SKTC with towel 3x 30 sec x    Piriformis stretch 3x 15 sec x Groin pain last session - cues for SEBASTIAN stretch avoiding adduction for impingement. Used towel over top of knee to pull    LTR 5x 5 sec     HS stretch 3x 15 sec     Sidelying IT band stretch - leg in front of patient off edge 3x 30 sec x    Sidelying clamshell  15*x  x           Seated: TrA pushing hands into legs* 10x5 sec  x    TrA + march* 10x  x           Standing:       HR/TR, marches, HS curls, mini squat 10x  x With ab brace   3 way hip 10x   With ab brace   HS stretch on step 3x 20 sec       Specific Interventions Next Treatment: nustep warm up, core strengthening, hip strengthening, hip stretching and lumbar stretching    Activity/Treatment Tolerance:  [x]  Patient tolerated treatment well  []  Patient limited by fatigue  []  Patient limited by pain   []  Patient limited by medical complications  []  Other:     Assessment: Continued with therex with progressions made as indicated by * in grid. Good TrA activation noted in sitting. Continued to report 4/10 pain at end of session. Denied back pain, and stated pain is localized to post LE.     GOALS:  Patient Goal: reduce pain, return to walking    Short Term Goals:  Time Frame: 3 weeks    1. Patient will report decrease in back and hip pain to 3/10 at most to promote ease of household tasks.   2. Patient will improve B hip strength to 4+/5 to improve ability to ambulate with a normalized gait pattern. 3. Patient will perform B SLR for 10 seconds to promote increased core strength needed for ease of standing tasks. 4. Patient will be indep with HEP in order to meet long term goals. Long Term Goals:  Time Frame: 6 wks    1. Patient will improve Modified Oswestry score from 20/50 to 12/50 to allow decrease in disability and improved functional mobility for improved overall QOL. 2. Patient will be indep with HEP in order to prevent re-injury and improve ability to perform functional mobility tasks. Patient Education:   [x]  HEP/Education Completed: continue HEP, transitioning to 1x/wk due to cost  [place] Access Code: [email or web address]  []  No new Education completed  []  Reviewed Prior HEP      [x]  Patient verbalized and/or demonstrated understanding of education provided. []  Patient unable to verbalize and/or demonstrate understanding of education provided. Will continue education. []  Barriers to learning:     PLAN:  Treatment Recommendations: Strengthening, Range of Motion, Balance Training, Manual Therapy - Soft Tissue Mobilization, Manual Therapy - Joint Manipulation, Pain Management, Home Exercise Program, Patient Education, Safety Education and Training, Aquatics, and Modalities    []  Plan of care initiated. Plan to see patient 2 times per week for 6 weeks to address the treatment planned outlined above.  Will begin on land and transition to pool when heart monitor is removed  [x]  Continue with current plan of care  []  Modify plan of care as follows:    []  Hold pending physician visit  []  Discharge    Time In 1130   Time Out 1211   Timed Code Minutes: 41   Total Treatment Time: 41     Electronically Signed by: Isael Larry, [address] [facility]

## 2022-12-21 ENCOUNTER — TELEPHONE (OUTPATIENT)
Dept: FAMILY MEDICINE CLINIC | Age: 72
End: 2022-12-21

## 2022-12-21 NOTE — TELEPHONE ENCOUNTER
Patient did a mail-a-way SJHEU21 test 3 weeks ago. It just came back positive and the company said that it had traces of blood in it. They voiced concern about the blood. She reports feeling okay and not having any symptoms, but would like to know what to do next.

## 2022-12-22 ENCOUNTER — APPOINTMENT (OUTPATIENT)
Dept: PHYSICAL THERAPY | Age: 72
End: 2022-12-22
Payer: MEDICARE

## 2022-12-22 ENCOUNTER — HOSPITAL ENCOUNTER (OUTPATIENT)
Dept: PHYSICAL THERAPY | Age: 72
Setting detail: THERAPIES SERIES
Discharge: HOME | End: 2022-12-22
Payer: MEDICARE

## 2022-12-22 PROCEDURE — 97113 AQUATIC THERAPY/EXERCISES: CPT

## 2022-12-22 NOTE — TELEPHONE ENCOUNTER
This nurse spoke with patient. She states that she meant to tell us her colon test came back positive. She states she had it done 3 months ago but I do not see it in the chart.

## 2022-12-22 NOTE — PROGRESS NOTES
[address] [facility]  PHYSICAL THERAPY  [] EVALUATION  [] DAILY NOTE (LAND) [x] DAILY NOTE (AQUATIC ) [] PROGRESS NOTE [] DISCHARGE NOTE    [x] OUTPATIENT REHABILITATION CENTER [facility]   [] [place] [address]    [] [facility]   [] Cassie Corcoran    Date: 2022  Patient Name:  Ghazala Crowder  : 1950  MRN: [id number]  CSN: [id number]    Referring Practitioner Randy Bowling, APR*   Diagnosis Lumbar back pain with radiculopathy affecting lower extremity [M54.16]    Treatment Diagnosis core and LE weakness, hip stiffness, impaired posture and impaired balance   Date of Evaluation 10/18/22    Additional Pertinent History HTN, DM , B TKA      Functional Outcome Measure Used Modified Oswestry   Functional Outcome Score  (10/18/22)  (22)      Insurance: Primary: Payor: Lady Gale /  /  / ,   Secondary:    Authorization Information: No Pre-cert required   Visit # 16, 7/10 for progress note   Visits Allowed: UNLIMITED VISITS BASED ON MEDICAL NECESSITY   Recertification Date: Feb 15, 2023   Physician Follow-Up: Nov 3, 2022   Physician Orders: Shaunna De Luna and treat   History of Present Illness: Pt is a 69 y/o female presenting to skilled PT services with c/o low back pain. Pt reports experiencing back pain for years of insidious onset. Reports radicular pain into B LE, especially hips, within the last year. Pain is not constant but comes and goes. Pain increases with standing activity and walking, decreases with sitting. Denies n/t. No imaging completed. Takes motrin when leaving the home which does provide a little relief. SUBJECTIVE:  Patient c/o continued pain in back of legs.       AQUATICS TREATMENT   Precautions:    Pain: 4/10 post LE    X in shaded column indicates activity completed today   Exercise/Intervention Sets/Sec  Notes   Walk Forward 3 laps  X    Walk Backward 3 laps  X    Walk Sideways 3 laps  X           Lower Extremity Exercises: Heel/Toe Raises 15  X With ab brace   Marches 15  X \"   Squats 15  X    3 Way Hip 15 ea  X \"   Hamstring Curls 15  X    Lunges: fwd and lat 15 ea  X    Step-Ups: fwd and lat 12 ea  x           Core Exercises:       Paddle board push and pull fwd and back 15  X    Paddle board push down 15  X           Lower Extremity Stretches:       HS stretch at bench 3x20 sec      Piriformis stretch with LE propped on bench 3x20 sec             Seated Exercises:              Upper Extremity Exercises:       Shoulder Flexion 15x  X With ab brace   Shoulder ABD/ADD 15x  x    Shoulder Horizontal ABD/ADD* 15x  x    Shoulder IR/ER       Shoulder Circles       Shoulder Shrugs       Rows       Bicep Curls              Upper Extremity Stretches:              Balance:              Dynamic Gait:              Deep Water:    4'10\", staying close to steps   Hang 4 min  X    Bicycle 2 min  X    Hip ABD/ADD 2 min  X    Hip Flex/Ext 2 min  X      Specific Interventions Next Treatment: nustep warm up, core strengthening, hip strengthening, hip stretching and lumbar stretching    Activity/Treatment Tolerance:  [x]  Patient tolerated treatment well  []  Patient limited by fatigue  []  Patient limited by pain   []  Patient limited by medical complications  []  Other:     Assessment: Continued with aquatic program, with patient requiring cues for core engagement and exercise technique. Pt notes deep water feels better than shallow. Pt can tell she worked her legs but otherwise feeling ok. GOALS:  Patient Goal: reduce pain, return to walking    Short Term Goals:  Time Frame: 3 weeks    1. Patient will report decrease in back and hip pain to 3/10 at most to promote ease of household tasks. 2. Patient will improve B hip strength to 4+/5 to improve ability to ambulate with a normalized gait pattern. 3. Patient will perform B SLR for 10 seconds to promote increased core strength needed for ease of standing tasks.       Long Term Goals:  Time Frame: 6 wks    1. Patient will improve Modified Oswestry score from 20/50 to 12/50 to allow decrease in disability and improved functional mobility for improved overall QOL. 2. Patient will be indep with HEP in order to prevent re-injury and improve ability to perform functional mobility tasks. Patient Education:   []  HEP/Education Completed: Monitor response to progression of reps. [employer] Access Code: [email or web address]  [x]  No new Education completed  []  Reviewed Prior HEP      []  Patient verbalized and/or demonstrated understanding of education provided. []  Patient unable to verbalize and/or demonstrate understanding of education provided. Will continue education. []  Barriers to learning:     PLAN:  Treatment Recommendations: Strengthening, Range of Motion, Balance Training, Manual Therapy - Soft Tissue Mobilization, Manual Therapy - Joint Manipulation, Pain Management, Home Exercise Program, Patient Education, Safety Education and Training, Aquatics, and Modalities    []  Plan of care initiated. Plan to see patient 2 times per week for 6 weeks to address the treatment planned outlined above.    []  Continue with current plan of care  [x]  Modify plan of care as follows:  2x/wk for 4 additional weeks alternating land and pool  []  Hold pending physician visit  []  Discharge    Time In 1135   Time Out 1216   Timed Code Minutes:  41 min   Total Treatment Time:  41 min     Electronically Signed by: Shlomo Tamayo, PT

## 2023-01-04 ENCOUNTER — HOSPITAL ENCOUNTER (OUTPATIENT)
Dept: PHYSICAL THERAPY | Age: 73
Setting detail: THERAPIES SERIES
Discharge: HOME | End: 2023-01-04
Payer: MEDICARE

## 2023-01-04 ENCOUNTER — TELEPHONE (OUTPATIENT)
Dept: FAMILY MEDICINE CLINIC | Age: 73
End: 2023-01-04

## 2023-01-04 DIAGNOSIS — R19.5 POSITIVE FIT (FECAL IMMUNOCHEMICAL TEST): Primary | ICD-10-CM

## 2023-01-04 PROCEDURE — 97110 THERAPEUTIC EXERCISES: CPT

## 2023-01-04 NOTE — PROGRESS NOTES
[id number] [facility]  PHYSICAL THERAPY  [] EVALUATION  [] DAILY NOTE (LAND) [x] DAILY NOTE (AQUATIC ) [] PROGRESS NOTE [] DISCHARGE NOTE    [x] OUTPATIENT REHABILITATION [facility]   [] [place] [address]    [] [facility]   [] Raquel Arciniega    Date: 2023  Patient Name:  Юлия Bowman  : 1950  MRN: [id number]  CSN: [id number]    Referring Practitioner Giselle Thomas, APR*   Diagnosis Lumbar back pain with radiculopathy affecting lower extremity [M54.16]    Treatment Diagnosis core and LE weakness, hip stiffness, impaired posture and impaired balance   Date of Evaluation 10/18/22    Additional Pertinent History HTN, DM , B TKA      Functional Outcome Measure Used Modified Oswestry   Functional Outcome Score  (10/18/22)  (22)      Insurance: Primary: Payor: Dottie Lenz /  /  / ,   Secondary:    Authorization Information: No Pre-cert required   Visit # 17, 8/10 for progress note   Visits Allowed: UNLIMITED VISITS BASED ON MEDICAL NECESSITY   Recertification Date: Feb 15, 2023   Physician Follow-Up: Nov 3, 2022   Physician Orders: Wander Wesly and treat   History of Present Illness: Pt is a 69 y/o female presenting to skilled PT services with c/o low back pain. Pt reports experiencing back pain for years of insidious onset. Reports radicular pain into B LE, especially hips, within the last year. Pain is not constant but comes and goes. Pain increases with standing activity and walking, decreases with sitting. Denies n/t. No imaging completed. Takes motrin when leaving the home which does provide a little relief. SUBJECTIVE:  Patient states the pain in the back of her legs is consistent and occurs mostly when she is up on her feet. Reports the pool always feels good. Patient is compliant with HEP.         TREATMENT   Precautions:     Pain: 4/10 post LE     \"X in shaded column indicates activity completed today     *\" next to exercise/intervention indicates progression   Modalities Parameters/  Location   Notes                                 Manual Therapy Time/Technique   Notes                                 Exercise/Intervention     Notes   NuStep (seat 10/arms 9) 6 min Level 4* X                 Mat: TA contraction with posterior tilt 15x 5 sec X Cues for technique with bracing   TA + march 10x         TA with ball squeeze 15*x 3 sec         TA with band 10x orange       TA with leg extension 5x2 2 sec  Cues for ab brace   TA with alternating UE and LE 10x     Cues for ab brace   Bent knee fallouts: unilateral and bilateral 10x   X                 SKTC with towel 3x 30 sec  X     Piriformis stretch 3x 20 sec  X Groin pain last session - cues for SEBASTIAN stretch avoiding adduction for impingement. Used towel over top of knee to pull    LTR 5x 5 sec  X     HS stretch 3x 15 sec       Sidelying IT band stretch - leg in front of patient off edge 3x 30 sec  X     Sidelying clamshell  15x   X                 Seated: TrA pushing hands into legs 10x5 sec   X     TrA + march 10x   X                 Standing:           HR/TR, marches, HS curls, mini squat 10x   X With ab brace   3 way hip 10x    X With ab brace   Bilateral Hamstring stretch at step 3x 20 sec  X               Specific Interventions Next Treatment: nustep warm up, core strengthening, hip strengthening, hip stretching and lumbar stretching    Activity/Treatment Tolerance:  [x]  Patient tolerated treatment well  []  Patient limited by fatigue  []  Patient limited by pain   []  Patient limited by medical complications  []  Other:     Assessment: Patient notes standing exercises are more tiring on land versus in the pool and she can feel it a little more in her back. Progressions made within patient tolerance. Patient denies increased pain at end of session. Cues required to maintain core muscles engagement with exercises.        GOALS:  Patient Goal: reduce pain, return to walking    Short Term Goals:  Time Frame: 3 weeks    1. Patient will report decrease in back and hip pain to 3/10 at most to promote ease of household tasks. 2. Patient will improve B hip strength to 4+/5 to improve ability to ambulate with a normalized gait pattern. 3. Patient will perform B SLR for 10 seconds to promote increased core strength needed for ease of standing tasks. Long Term Goals:  Time Frame: 6 wks    1. Patient will improve Modified Oswestry score from 20/50 to 12/50 to allow decrease in disability and improved functional mobility for improved overall QOL. 2. Patient will be indep with HEP in order to prevent re-injury and improve ability to perform functional mobility tasks. Patient Education:   []  HEP/Education Completed: Monitor response to progression of reps. [employer] Access Code: [email or web address]  [x]  No new Education completed  []  Reviewed Prior HEP      []  Patient verbalized and/or demonstrated understanding of education provided. []  Patient unable to verbalize and/or demonstrate understanding of education provided. Will continue education. []  Barriers to learning:     PLAN:  Treatment Recommendations: Strengthening, Range of Motion, Balance Training, Manual Therapy - Soft Tissue Mobilization, Manual Therapy - Joint Manipulation, Pain Management, Home Exercise Program, Patient Education, Safety Education and Training, Aquatics, and Modalities    []  Plan of care initiated. Plan to see patient 2 times per week for 6 weeks to address the treatment planned outlined above.    [x]  Continue with current plan of care  []  Modify plan of care as follows:  2x/wk for 4 additional weeks alternating land and pool  []  Hold pending physician visit  []  Discharge    Time In 1045   Time Out 1130   Timed Code Minutes:  45 min   Total Treatment Time:  45 min     Electronically Signed by: Minus Dandy, PTA

## 2023-01-05 ENCOUNTER — OFFICE VISIT (OUTPATIENT)
Dept: FAMILY MEDICINE CLINIC | Age: 73
End: 2023-01-05

## 2023-01-05 VITALS
SYSTOLIC BLOOD PRESSURE: 128 MMHG | RESPIRATION RATE: 16 BRPM | DIASTOLIC BLOOD PRESSURE: 82 MMHG | BODY MASS INDEX: 46.93 KG/M2 | TEMPERATURE: 97.2 F | HEIGHT: 62 IN | WEIGHT: 255 LBS | HEART RATE: 71 BPM | OXYGEN SATURATION: 96 %

## 2023-01-05 DIAGNOSIS — R19.5 POSITIVE FIT (FECAL IMMUNOCHEMICAL TEST): Primary | ICD-10-CM

## 2023-01-05 ASSESSMENT — PATIENT HEALTH QUESTIONNAIRE - PHQ9
SUM OF ALL RESPONSES TO PHQ QUESTIONS 1-9: 0
2. FEELING DOWN, DEPRESSED OR HOPELESS: 0
SUM OF ALL RESPONSES TO PHQ QUESTIONS 1-9: 0
1. LITTLE INTEREST OR PLEASURE IN DOING THINGS: 0
SUM OF ALL RESPONSES TO PHQ9 QUESTIONS 1 & 2: 0

## 2023-01-05 ASSESSMENT — ENCOUNTER SYMPTOMS
SHORTNESS OF BREATH: 0
COUGH: 0
ABDOMINAL PAIN: 0

## 2023-01-05 NOTE — PROGRESS NOTES
Kathy Sullivan (:  1950) is a 67 y.o. female,Established patient, here for evaluation of the following chief complaint(s): Other (Pt would like to discuss her positive colon cancer screening. )      ASSESSMENT  1. Positive FIT (fecal immunochemical test)  2. Body mass index (BMI) 45.0-49.9, adult (HCC)    PLAN  - Proceed with colonoscopy for further evaluation. Referral already placed  - Continue to work on weight loss. Return if symptoms worsen or fail to improve, for Maintian current scheduled follow up. SUBJECTIVE/OBJECTIVE:  HPI    Positive FIT test:   No obvious rectal bleeding. No rectal pain. No cuts or fissures. No vaginal bleeding. No excessive weight or night sweats. No change appetites. No constipation. No diarrhea, does sometimes have loose stools. No abdominal pain fever or chills. Has referral to GI.  46.64: Working on weight loss. Has lost 2lbs since last visit. Not exercising. No other complaints at this time.        Past Medical History:   Diagnosis Date    Hyperlipidemia     Hypertension 1973    TIA (transient ischemic attack)     Type II or unspecified type diabetes mellitus without mention of complication, not stated as uncontrolled        Past Surgical History:   Procedure Laterality Date    TOTAL KNEE ARTHROPLASTY Bilateral 2017    [id number] [employer]       No Known Allergies      Current Outpatient Medications:     NIFEdipine (ADALAT CC) 90 MG extended release tablet, TAKE 1 TABLET TWICE A DAY, Disp: 180 tablet, Rfl: 3    hydrALAZINE (APRESOLINE) 50 MG tablet, TAKE 1 TABLET TWICE A DAY, Disp: 180 tablet, Rfl: 3    losartan (COZAAR) 50 MG tablet, TAKE 1 TABLET TWICE A DAY, Disp: 180 tablet, Rfl: 3    dapagliflozin (FARXIGA) 10 MG tablet, TAKE 1 TABLET EVERY MORNING, Disp: 90 tablet, Rfl: 1    metFORMIN (GLUCOPHAGE) 850 MG tablet, TAKE 1 TABLET TWICE A DAY WITH MEALS, Disp: 180 tablet, Rfl: 3    hydroCHLOROthiazide (HYDRODIURIL) 25 MG tablet, TAKE 1 TABLET DAILY, Disp: 90 tablet, Rfl: 3    atorvastatin (LIPITOR) 80 MG tablet, TAKE 1 TABLET DAILY, Disp: 90 tablet, Rfl: 3    metoprolol (LOPRESSOR) 100 MG tablet, TAKE 1 TABLET TWICE A DAY, Disp: 180 tablet, Rfl: 3    Cyanocobalamin (B-12 PO), Take by mouth, Disp: , Rfl:     Cholecalciferol (VITAMIN D3 PO), Take by mouth, Disp: , Rfl:     OLIVE LEAF EXTRACT PO, Take by mouth, Disp: , Rfl:     ELDERBERRY PO, Take by mouth, Disp: , Rfl:     Accu-Chek Multiclix Lancets MISC, 1 Units by Does not apply route 4 times daily, Disp: 204 each, Rfl: 1    blood glucose test strips (ACCU-CHEK ACTIVE STRIPS) strip, 1 each by In Vitro route 4 times daily As needed. , Disp: 200 each, Rfl: 3    INSULIN SYRINGE 1CC/29G 29G X 1/2\" 1 ML MISC, 1 each by Does not apply route daily, Disp: 30 each, Rfl: 0    Needle, Disp, (HYPODERMIC NEEDLE 54JE0-6/4\") 27G X 1-1/4\" MISC, 1 Units by Does not apply route daily, Disp: 30 each, Rfl: 5    aspirin 81 MG tablet, Take 81 mg by mouth daily, Disp: , Rfl:     Family History   Problem Relation Age of Onset    High Blood Pressure Mother     Kidney Disease Mother     Cancer Father         Leukemia    High Blood Pressure Sister     Diabetes Sister     Heart Disease Sister     Heart Disease Brother     Diabetes Sister     High Blood Pressure Sister     Heart Disease Sister     No Known Problems Sister     Diabetes Sister     Heart Disease Sister     Diabetes Sister     No Known Problems Sister     Breast Cancer Paternal Cousin 54    Breast Cancer Paternal Cousin 66       Social History     Tobacco Use    Smoking status: Never    Smokeless tobacco: Never   Substance Use Topics    Alcohol use: No    Drug use: No       Review of Systems   Constitutional:  Negative for fever. Respiratory:  Negative for cough and shortness of breath. Cardiovascular:  Negative for chest pain and palpitations. Gastrointestinal:  Negative for abdominal pain. Skin:  Negative for rash. All other systems reviewed and are negative. Vitals:    01/05/23 1621   BP: 128/82   Pulse: 71   Resp: 16   Temp: 97.2 °F (36.2 °C)   SpO2: 96%       Physical Exam  Vitals and nursing note reviewed. Constitutional:       General: She is not in acute distress. Appearance: Normal appearance. She is obese. She is not ill-appearing. HENT:      Head: Normocephalic and atraumatic. Right Ear: External ear normal.      Left Ear: External ear normal.   Eyes:      Extraocular Movements: Extraocular movements intact. Conjunctiva/sclera: Conjunctivae normal.   Cardiovascular:      Rate and Rhythm: Normal rate and regular rhythm. Pulses: Normal pulses. Heart sounds: No murmur heard. Pulmonary:      Effort: Pulmonary effort is normal. No respiratory distress. Breath sounds: Normal breath sounds. No wheezing. Abdominal:      General: Abdomen is flat. There is no distension. Palpations: Abdomen is soft. Tenderness: There is no abdominal tenderness. Musculoskeletal:         General: Normal range of motion. Cervical back: Normal range of motion and neck supple. No muscular tenderness. Right lower leg: No edema. Left lower leg: No edema. Skin:     General: Skin is warm and dry. Capillary Refill: Capillary refill takes less than 2 seconds. Findings: No rash (On exposed surfaces). Neurological:      General: No focal deficit present. Mental Status: She is alert and oriented to person, place, and time. Gait: Gait normal.   Psychiatric:         Mood and Affect: Mood normal.         Behavior: Behavior normal.         An electronic signature was used to authenticate this note.     --Suze Sanchez MD

## 2023-01-05 NOTE — PROGRESS NOTES
S: 67 y.o. female with   Chief Complaint   Patient presents with    Other     Pt would like to discuss her positive colon cancer screening. HPI: please see resident note for HPI and ROS. BP Readings from Last 3 Encounters:   01/05/23 128/82   12/16/22 118/70   11/03/22 116/76     Wt Readings from Last 3 Encounters:   01/05/23 255 lb (115.7 kg)   12/16/22 257 lb (116.6 kg)   11/03/22 260 lb 9.6 oz (118.2 kg)       O: VS:  height is 5' 2\" (1.575 m) and weight is 255 lb (115.7 kg). Her temperature is 97.2 °F (36.2 °C). Her blood pressure is 128/82 and her pulse is 71. Her respiration is 16 and oxygen saturation is 96%. Diagnosis Orders   1. Positive FIT (fecal immunochemical test)        2. Body mass index (BMI) 45.0-49.9, adult [facility])            Plan:  Please refer to resident note for full plan. Positive FIT: Referral placed to gastroenterology for colonoscopy (referral placed earlier today) --advised to follow-up accordingly. Return to office as scheduled with PCP for follow-up on chronic medical conditions. Health Maintenance Due   Topic Date Due    COVID-19 Vaccine (1) Never done    Shingles vaccine (1 of 2) Never done    Diabetic retinal exam  02/07/2020    Flu vaccine (1) Never done       Attending Physician Statement  I have discussed the case, including pertinent history and exam findings with the resident. I agree with the documented assessment and plan as documented by the resident.         Aminata Long DO 1/5/2023 11:41 PM

## 2023-01-12 ENCOUNTER — HOSPITAL ENCOUNTER (OUTPATIENT)
Dept: PHYSICAL THERAPY | Age: 73
Setting detail: THERAPIES SERIES
Discharge: HOME | End: 2023-01-12
Payer: MEDICARE

## 2023-01-12 PROCEDURE — 97110 THERAPEUTIC EXERCISES: CPT

## 2023-01-12 NOTE — PROGRESS NOTES
[facility]  PHYSICAL THERAPY  [] EVALUATION  [x] DAILY NOTE (LAND) [] DAILY NOTE (AQUATIC ) [] PROGRESS NOTE [] DISCHARGE NOTE    [x] OUTPATIENT REHABILITATION CENTER [facility]   [] [place] [address]    [] [facility]   [] Fabian Anton    Date: 2023  Patient Name:  Jailene Glynn  : 1950  MRN: [id number]  CSN: [id number]    Referring Practitioner Garrett Humphrey, APR*   Diagnosis Lumbar back pain with radiculopathy affecting lower extremity [M54.16]    Treatment Diagnosis core and LE weakness, hip stiffness, impaired posture and impaired balance   Date of Evaluation 10/18/22    Additional Pertinent History HTN, DM , B TKA      Functional Outcome Measure Used Modified Oswestry   Functional Outcome Score  (10/18/22)  (22)      Insurance: Primary: Payor: Uriah Meza /  /  / ,   Secondary:    Authorization Information: No Pre-cert required   Visit # 18, 9/10 for progress note   Visits Allowed: UNLIMITED VISITS BASED ON MEDICAL NECESSITY   Recertification Date: Feb 15, 2023   Physician Follow-Up: Nov 3, 2022   Physician Orders: Lois De La Torre and treat   History of Present Illness: Pt is a 71 y/o female presenting to skilled PT services with c/o low back pain. Pt reports experiencing back pain for years of insidious onset. Reports radicular pain into B LE, especially hips, within the last year. Pain is not constant but comes and goes. Pain increases with standing activity and walking, decreases with sitting. Denies n/t. No imaging completed. Takes motrin when leaving the home which does provide a little relief. SUBJECTIVE:  Pt reports feeling sore after previous session. States today her low back feels \"not too bad\" and rates pain 3/10.         TREATMENT   Precautions:     Pain: 3/10 post LE     \"X in shaded column indicates activity completed today     *\" next to exercise/intervention indicates progression   Modalities Parameters/  Location Notes                                 Manual Therapy Time/Technique   Notes                                 Exercise/Intervention     Notes   NuStep (seat 10/arms 9) 6 min Level 4 X                 Mat: TA contraction with posterior tilt 15x 5 sec X Cues for technique with bracing   TA + march 10x         TA with ball squeeze 15*x 3 sec         TA with band 10x orange       TA with leg extension 5x2 2 sec  Cues for ab brace   TA with alternating UE and LE 10x     Cues for ab brace   Bent knee fallouts: unilateral and bilateral 12*x   X                 SKTC with towel 3x 30 sec  X     Piriformis stretch 3x 30* sec  X Groin pain last session - reports groin pain not as severe compared to previous visits    LTR 10*x 5 sec  X     HS stretch 3x 15 sec       Sidelying IT band stretch - leg in front of patient off edge 3x 30 sec  X     Sidelying clamshell  15x   X                 Seated: TrA pushing hands into legs 10x5 sec        TrA + march 10x                    Standing:           HR/TR, marches, HS curls, mini squat 10x   X With ab brace   3 way hip 10x    X With ab brace   Bilateral Hamstring stretch at step 3x 20 sec  x               Specific Interventions Next Treatment: nustep warm up, core strengthening, hip strengthening, hip stretching and lumbar stretching    Activity/Treatment Tolerance:  [x]  Patient tolerated treatment well  []  Patient limited by fatigue  []  Patient limited by pain   []  Patient limited by medical complications  []  Other:     Assessment: Initiated treatment with standing exercises first vs end to improve pt's tolerance to standing. Pt reported \"I'm starting to feel it\" with completion of standing therex, transitioned to supine exercises. Pt reported decrease in stiffness in LBP after completion of supine exercises. Consider continuing standing exercises prior to supine next visit.        GOALS:  Patient Goal: reduce pain, return to walking    Short Term Goals:  Time Frame: 3 weeks    1. Patient will report decrease in back and hip pain to 3/10 at most to promote ease of household tasks. 2. Patient will improve B hip strength to 4+/5 to improve ability to ambulate with a normalized gait pattern. 3. Patient will perform B SLR for 10 seconds to promote increased core strength needed for ease of standing tasks. Long Term Goals:  Time Frame: 6 wks    1. Patient will improve Modified Oswestry score from 20/50 to 12/50 to allow decrease in disability and improved functional mobility for improved overall QOL. 2. Patient will be indep with HEP in order to prevent re-injury and improve ability to perform functional mobility tasks. Patient Education:   []  HEP/Education Completed: Monitor response to progression of reps. [employer] Access Code: [email or web address]  [x]  No new Education completed  []  Reviewed Prior HEP      []  Patient verbalized and/or demonstrated understanding of education provided. []  Patient unable to verbalize and/or demonstrate understanding of education provided. Will continue education. []  Barriers to learning:     PLAN:  Treatment Recommendations: Strengthening, Range of Motion, Balance Training, Manual Therapy - Soft Tissue Mobilization, Manual Therapy - Joint Manipulation, Pain Management, Home Exercise Program, Patient Education, Safety Education and Training, Aquatics, and Modalities    []  Plan of care initiated. Plan to see patient 2 times per week for 6 weeks to address the treatment planned outlined above.    [x]  Continue with current plan of care  []  Modify plan of care as follows:  2x/wk for 4 additional weeks alternating land and pool  []  Hold pending physician visit  []  Discharge    Time In 1100   Time Out 1140   Timed Code Minutes: 40   Total Treatment Time: 40     Electronically Signed by: Miladys Rubin PTA

## 2023-01-19 ENCOUNTER — HOSPITAL ENCOUNTER (OUTPATIENT)
Dept: PHYSICAL THERAPY | Age: 73
Setting detail: THERAPIES SERIES
End: 2023-01-19
Payer: MEDICARE

## 2023-01-26 ENCOUNTER — APPOINTMENT (OUTPATIENT)
Dept: PHYSICAL THERAPY | Age: 73
End: 2023-01-26
Payer: MEDICARE

## 2023-02-02 ENCOUNTER — HOSPITAL ENCOUNTER (OUTPATIENT)
Dept: PHYSICAL THERAPY | Age: 73
Setting detail: THERAPIES SERIES
Discharge: HOME | End: 2023-02-02
Payer: MEDICARE

## 2023-02-02 PROCEDURE — 97530 THERAPEUTIC ACTIVITIES: CPT

## 2023-02-02 PROCEDURE — 97110 THERAPEUTIC EXERCISES: CPT

## 2023-02-02 NOTE — DISCHARGE SUMMARY
[address] [facility]  PHYSICAL THERAPY  [] EVALUATION  [] DAILY NOTE (LAND) [] DAILY NOTE (AQUATIC ) [] PROGRESS NOTE [x] DISCHARGE NOTE    [x] OUTPATIENT REHABILITATION [facility]   [] [place] [address]    [] [facility]   [] [facility]    Date: 2023  Patient Name:  Fredy Bello  : 1950  MRN: [id number]  CSN: [id number]    Referring Practitioner Han Laws, APR*   Diagnosis Lumbar back pain with radiculopathy affecting lower extremity [M54.16]    Treatment Diagnosis core and LE weakness, hip stiffness, impaired posture and impaired balance   Date of Evaluation 10/18/22    Additional Pertinent History HTN, DM , B TKA      Functional Outcome Measure Used Modified Oswestry   Functional Outcome Score  (10/18/22)  (22)  (23)      Insurance: Primary: Payor: Zenia Mtz /  /  / ,   Secondary:    Authorization Information: No Pre-cert required   Visit # 23, 0/10 for progress note   Visits Allowed: UNLIMITED VISITS BASED ON MEDICAL NECESSITY   Recertification Date: Feb 15, 2023   Physician Follow-Up: Nov 3, 2022   Physician Orders: Sara Manuel and treat   History of Present Illness: Pt is a 71 y/o female presenting to skilled PT services with c/o low back pain. Pt reports experiencing back pain for years of insidious onset. Reports radicular pain into B LE, especially hips, within the last year. Pain is not constant but comes and goes. Pain increases with standing activity and walking, decreases with sitting. Denies n/t. No imaging completed. Takes motrin when leaving the home which does provide a little relief. SUBJECTIVE:  Pt reports 2/10 post LE pain today. Does feel as though skilled PT services have helped to reduce stiffness and overall pain. States she can feel that difference. Is ready for d/c today due to cost of therapy.       TREATMENT   Precautions:     Pain: 2/10 post LE     \"X in shaded column indicates activity completed today     *\" next to exercise/intervention indicates progression   Modalities Parameters/  Location   Notes                                 Manual Therapy Time/Technique   Notes                                 Exercise/Intervention     Notes   NuStep (seat 10/arms 9) 6 min Level 4 X                 Mat: TA contraction with posterior tilt 15x 5 sec X Cues for technique with bracing   TA + march 10x         TA with ball squeeze 15x 3 sec    X     TA with band 10x orange       TA with leg extension 5x2 2 sec  Cues for ab brace   TA with alternating UE and LE 10x     Cues for ab brace   Bent knee fallouts: unilateral and bilateral 12x   X                 SKTC with towel 3x 30 sec  X     Piriformis stretch 3x 30 sec      LTR 10x 5 sec  X     HS stretch 3x 15 sec       Sidelying IT band stretch - leg in front of patient off edge 3x 30 sec       Sidelying clamshell  15x                    Seated: TrA pushing hands into legs 10x5 sec        TrA + march 10x                    Standing:           HR/TR, marches, HS curls, mini squat 10x   X With ab brace   3 way hip 10x    X With ab brace   Bilateral Hamstring stretch at step 3x 20 sec  X                 Activity/Treatment Tolerance:  [x]  Patient tolerated treatment well  []  Patient limited by fatigue  []  Patient limited by pain   []  Patient limited by medical complications  []  Other:     Assessment: Pt has made good progress during her time in skilled PT services, meeting 2/3 STG and 1/2 LTG. She reports decreased overall LBP and post LE pain and reduced stiffness. Demonstrates increased LE strength however continues to demonstrate core weakness. She reports slight improvement in ability to perform functional mobility tasks, improving Modified Oswestry score from 20/50 at eval to 16/50 today. She is d/c from skilled PT services at this time with [facility].        GOALS:  Patient Goal: reduce pain, return to walking    Short Term Goals:  Time Frame: 3 weeks    1. Patient will report decrease in back and hip pain to 3/10 at most to promote ease of household tasks.  [x] Goal Met [] Goal Not Met [] Continue Goal [] Discontinue Goal  [] Revise Goal  Goal Assessment: Pain averages around 2-3/10, however can increase to 5/10 with increased activity     2. Patient will improve B hip strength to 4+/5 to improve ability to ambulate with a normalized gait pattern.  [x] Goal Met [] Goal Not Met [] Continue Goal [] Discontinue Goal  [] Revise Goal  Goal Assessment: B hip ABD and flex 4+/5    3. Patient will perform B SLR for 10 seconds to promote increased core strength needed for ease of standing tasks.  [] Goal Met [x] Goal Not Met [] Continue Goal [] Discontinue Goal  [] Revise Goal  Goal Assessment: only about 3 sec today    Long Term Goals:  Time Frame: 6 wks    1. Patient will improve Modified Oswestry score from 20/50 to 12/50 to allow decrease in disability and improved functional mobility for improved overall QOL.  [] Goal Met [x] Goal Not Met [] Continue Goal [] Discontinue Goal  [] Revise Goal  Goal Assessment: 16/50 today    2. Patient will be indep with HEP in order to prevent re-injury and improve ability to perform functional mobility tasks.   [x] Goal Met [] Goal Not Met [] Continue Goal [] Discontinue Goal  [] Revise Goal  Goal Assessment: Reports fair compliance with HEP, states she will continue after discharge      Patient Education:   [x]  HEP/Education Completed: continue HEP   [facility] [email or web address]  [x]  No new Education completed  []  Reviewed Prior HEP      []  Patient verbalized and/or demonstrated understanding of education provided.  []  Patient unable to verbalize and/or demonstrate understanding of education provided.  Will continue education.  []  Barriers to learning:     PLAN:  []  Plan of care initiated.  Plan to see patient 2 times per week for 6 weeks to address the treatment planned outlined above.   []  Continue with  current plan of care  []  Modify plan of care as follows:  2x/wk for 4 additional weeks alternating land and pool  []  Hold pending physician visit  [x]  Discharge    Time In 1100   Time Out 1140   Timed Code Minutes: 40   Total Treatment Time: 40     Electronically Signed by: Sena Bond PT

## 2023-02-07 RX ORDER — METOPROLOL TARTRATE 100 MG/1
TABLET ORAL
Qty: 180 TABLET | Refills: 3 | Status: SHIPPED | OUTPATIENT
Start: 2023-02-07

## 2023-04-18 ENCOUNTER — NURSE ONLY (OUTPATIENT)
Dept: LAB | Age: 73
End: 2023-04-18

## 2023-04-18 DIAGNOSIS — I10 PRIMARY HYPERTENSION: ICD-10-CM

## 2023-04-18 DIAGNOSIS — R80.9 ALBUMINURIA: ICD-10-CM

## 2023-04-18 LAB
ANION GAP SERPL CALC-SCNC: 11 MEQ/L (ref 8–16)
BUN SERPL-MCNC: 16 MG/DL (ref 7–22)
CALCIUM SERPL-MCNC: 9.8 MG/DL (ref 8.5–10.5)
CHLORIDE SERPL-SCNC: 105 MEQ/L (ref 98–111)
CO2 SERPL-SCNC: 26 MEQ/L (ref 23–33)
CREAT SERPL-MCNC: 0.8 MG/DL (ref 0.4–1.2)
CREAT UR-MCNC: 68.7 MG/DL
GFR SERPL CREATININE-BSD FRML MDRD: > 60 ML/MIN/1.73M2
GLUCOSE SERPL-MCNC: 115 MG/DL (ref 70–108)
POTASSIUM SERPL-SCNC: 4.1 MEQ/L (ref 3.5–5.2)
PROT UR-MCNC: 14.4 MG/DL
PROT/CREAT 24H UR: 0.21 MG/G{CREAT}
SODIUM SERPL-SCNC: 142 MEQ/L (ref 135–145)

## 2023-04-20 ENCOUNTER — OFFICE VISIT (OUTPATIENT)
Dept: NEPHROLOGY | Age: 73
End: 2023-04-20
Payer: MEDICARE

## 2023-04-20 VITALS
DIASTOLIC BLOOD PRESSURE: 78 MMHG | HEART RATE: 68 BPM | WEIGHT: 259 LBS | BODY MASS INDEX: 47.37 KG/M2 | SYSTOLIC BLOOD PRESSURE: 129 MMHG | OXYGEN SATURATION: 97 %

## 2023-04-20 DIAGNOSIS — I10 PRIMARY HYPERTENSION: Primary | ICD-10-CM

## 2023-04-20 PROCEDURE — 3078F DIAST BP <80 MM HG: CPT | Performed by: INTERNAL MEDICINE

## 2023-04-20 PROCEDURE — 1123F ACP DISCUSS/DSCN MKR DOCD: CPT | Performed by: INTERNAL MEDICINE

## 2023-04-20 PROCEDURE — 3074F SYST BP LT 130 MM HG: CPT | Performed by: INTERNAL MEDICINE

## 2023-04-20 PROCEDURE — 99213 OFFICE O/P EST LOW 20 MIN: CPT | Performed by: INTERNAL MEDICINE

## 2023-04-20 NOTE — PROGRESS NOTES
mg/g  April 2021: K 3.7, creat 0.8, Albumin 4.0, UPCR 240 mg/g, immunofixation normal, UA no blood, no protein    Nov 2021: Hb 14.7  April 2022: K 4.0, creat 0.8, Ca 9.7, UPCR 270 mg/g  April 2023: K 4.1, Creat 0.8, Glucose 115, UPCR 210 mg/g     Impression/Plan:   1. HTN:  Likely essential. Stable, continue with current medications. 2. 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 Not much proteinuria. Already on ARB. 3. Dyslipidemia: on lipitor. 4. DM: on metformin and farxiga. Seeing PCP  5. Advised weight loss. Orders Placed This Encounter   Procedures    Basic Metabolic Panel    Urinalysis    Protein / creatinine ratio, urine     Return in about 1 year (around 4/20/2024).       Nataliia Dodd MD  [facility]

## 2023-04-27 ENCOUNTER — OFFICE VISIT (OUTPATIENT)
Dept: FAMILY MEDICINE CLINIC | Age: 73
End: 2023-04-27

## 2023-04-27 VITALS
RESPIRATION RATE: 16 BRPM | BODY MASS INDEX: 46.7 KG/M2 | SYSTOLIC BLOOD PRESSURE: 128 MMHG | WEIGHT: 253.8 LBS | HEIGHT: 62 IN | TEMPERATURE: 97.3 F | OXYGEN SATURATION: 98 % | HEART RATE: 72 BPM | DIASTOLIC BLOOD PRESSURE: 76 MMHG

## 2023-04-27 DIAGNOSIS — R80.9 MICROALBUMINURIA DUE TO TYPE 2 DIABETES MELLITUS (HCC): ICD-10-CM

## 2023-04-27 DIAGNOSIS — E53.8 VITAMIN B 12 DEFICIENCY: ICD-10-CM

## 2023-04-27 DIAGNOSIS — N18.2 TYPE 2 DIABETES MELLITUS WITH STAGE 2 CHRONIC KIDNEY DISEASE, WITHOUT LONG-TERM CURRENT USE OF INSULIN (HCC): Primary | ICD-10-CM

## 2023-04-27 DIAGNOSIS — E78.00 HYPERCHOLESTEROLEMIA: ICD-10-CM

## 2023-04-27 DIAGNOSIS — E11.29 MICROALBUMINURIA DUE TO TYPE 2 DIABETES MELLITUS (HCC): ICD-10-CM

## 2023-04-27 DIAGNOSIS — I10 PRIMARY HYPERTENSION: ICD-10-CM

## 2023-04-27 DIAGNOSIS — E11.22 TYPE 2 DIABETES MELLITUS WITH STAGE 2 CHRONIC KIDNEY DISEASE, WITHOUT LONG-TERM CURRENT USE OF INSULIN (HCC): Primary | ICD-10-CM

## 2023-04-27 LAB — HBA1C MFR BLD: 6.2 % (ref 4.3–5.7)

## 2023-04-27 RX ORDER — METOPROLOL TARTRATE 100 MG/1
100 TABLET ORAL 2 TIMES DAILY
COMMUNITY

## 2023-04-27 RX ORDER — METOPROLOL TARTRATE 50 MG/1
50 TABLET, FILM COATED ORAL 2 TIMES DAILY
COMMUNITY
End: 2023-04-27 | Stop reason: ALTCHOICE

## 2023-04-27 RX ORDER — ATORVASTATIN CALCIUM 80 MG/1
80 TABLET, FILM COATED ORAL DAILY
Qty: 90 TABLET | Refills: 3 | Status: SHIPPED | OUTPATIENT
Start: 2023-04-27

## 2023-04-27 SDOH — ECONOMIC STABILITY: INCOME INSECURITY: HOW HARD IS IT FOR YOU TO PAY FOR THE VERY BASICS LIKE FOOD, HOUSING, MEDICAL CARE, AND HEATING?: NOT VERY HARD

## 2023-04-27 SDOH — ECONOMIC STABILITY: HOUSING INSECURITY
IN THE LAST 12 MONTHS, WAS THERE A TIME WHEN YOU DID NOT HAVE A STEADY PLACE TO SLEEP OR SLEPT IN A SHELTER (INCLUDING NOW)?: NO

## 2023-04-27 SDOH — ECONOMIC STABILITY: FOOD INSECURITY: WITHIN THE PAST 12 MONTHS, THE FOOD YOU BOUGHT JUST DIDN'T LAST AND YOU DIDN'T HAVE MONEY TO GET MORE.: NEVER TRUE

## 2023-04-27 SDOH — ECONOMIC STABILITY: FOOD INSECURITY: WITHIN THE PAST 12 MONTHS, YOU WORRIED THAT YOUR FOOD WOULD RUN OUT BEFORE YOU GOT MONEY TO BUY MORE.: NEVER TRUE

## 2023-04-27 ASSESSMENT — ENCOUNTER SYMPTOMS
WHEEZING: 0
CONSTIPATION: 0
DIARRHEA: 0
BACK PAIN: 0
NAUSEA: 0
COUGH: 0
VOMITING: 0
ABDOMINAL PAIN: 0
SHORTNESS OF BREATH: 0

## 2023-04-27 NOTE — PATIENT INSTRUCTIONS
Thank you   Thank you for trusting us with your healthcare needs. You may receive a survey regarding today's visit. It would help us out if you would take a few moments to provide your feedback. We value your input. Please bring in ALL medications BOTTLES, including inhalers, herbal supplements, over the counter, prescribed & non-prescribed medicine. The office would like actual medication bottles and a list.   Please note our OFFICE POLICIES:   Prior to getting your labs drawn, please check with your insurance company for benefits and eligibility of lab services. Often, insurance companies cover certain tests for preventative visits only. It is patient's responsibility to see what is covered. We are unable to change a diagnosis after the test has been performed. Lab orders will not be re-printed. Please hold onto your original lab orders and take them to your lab to be completed. If you no show your scheduled appointment three times, you will be dismissed from this practice. Reschedules must be completed 24 hours prior to your schedule appointment. If the list below has been completed, PLEASE FAX RECORDS TO OUR OFFICE @ [phone].  Once the records have been received we will update your records at our office:  Health Maintenance Due   Topic Date Due    COVID-19 Vaccine (1) Never done    Shingles vaccine (1 of 2) Never done    Diabetic retinal exam  02/07/2020    Breast cancer screen  04/20/2023

## 2023-04-27 NOTE — PROGRESS NOTES
Health Maintenance Due   Topic Date Due    COVID-19 Vaccine (1) Never done    Shingles vaccine (1 of 2) Never done    Diabetic retinal exam  02/07/2020    Breast cancer screen  04/20/2023
Plan to recheck iron B12 level in 6 months. Handicap placard prescribed today. Labs ordered as above --to be completed in 6 months. Follow-up in 6 months for recheck or sooner if needed. Health Maintenance Due   Topic Date Due    COVID-19 Vaccine (1) Never done    Shingles vaccine (1 of 2) Never done    Diabetic retinal exam  02/07/2020    Breast cancer screen  04/20/2023       Attending Physician Statement  I have discussed the case, including pertinent history and exam findings with the resident. I also have seen the patient and performed key portions of the examination. I agree with the documented assessment and plan as documented by the resident.         Lashonda Luna,  4/28/2023 8:17 AM
Rate and Rhythm: Normal rate and regular rhythm. Pulses: Normal pulses. Heart sounds: No murmur heard. Pulmonary:      Effort: Pulmonary effort is normal. No respiratory distress. Breath sounds: Normal breath sounds. No wheezing. Abdominal:      General: Bowel sounds are normal. There is no distension. Palpations: Abdomen is soft. There is no mass. Tenderness: There is no abdominal tenderness. Musculoskeletal:      Cervical back: Neck supple. Right lower leg: No edema. Left lower leg: No edema. Skin:     General: Skin is warm and dry. Neurological:      General: No focal deficit present. Mental Status: She is alert and oriented to person, place, and time. Psychiatric:         Mood and Affect: Mood normal.         Behavior: Behavior normal.       Immunization History   Administered Date(s) Administered    Pneumococcal, PCV-13, PREVNAR 13, (age 6w+), IM, 0.5mL 01/14/2019    Pneumococcal, PPSV23, PNEUMOVAX 23, (age 2y+), SC/IM, 0.5mL 03/01/2021    TDaP, ADACEL (age 10y-63y), Clayburn Hash (age 10y+), IM, 0.5mL 01/14/2019       Health Maintenance Due   Topic Date Due    COVID-19 Vaccine (1) Never done    Shingles vaccine (1 of 2) Never done    Diabetic retinal exam  02/07/2020    Breast cancer screen  04/20/2023       Food Insecurity: No Food Insecurity    Worried About Running Out of Food in the Last Year: Never true    Ran Out of Food in the Last Year: Never true       Assessment / Plan:   1. Type 2 diabetes mellitus with stage 2 chronic kidney disease, without long-term current use of insulin (HCC)  Chronic, well controlled. A1c 6.2 in office today. Continue current dose metformin and Farxiga at this time  -Patient encouraged to schedule eye doctor appointment  -Handicap placard provided  -Plan to have labs repeated prior to follow-up visit in 6 months.  - POCT glycosylated hemoglobin (Hb A1C)  - Hemoglobin A1C; Future  - Comprehensive Metabolic Panel;  Future  -

## 2023-10-30 RX ORDER — HYDROCHLOROTHIAZIDE 25 MG/1
25 TABLET ORAL DAILY
Qty: 90 TABLET | Refills: 3 | Status: SHIPPED | OUTPATIENT
Start: 2023-10-30

## 2023-11-06 ENCOUNTER — NURSE ONLY (OUTPATIENT)
Dept: LAB | Age: 73
End: 2023-11-06

## 2023-11-06 ENCOUNTER — OFFICE VISIT (OUTPATIENT)
Dept: FAMILY MEDICINE CLINIC | Age: 73
End: 2023-11-06
Payer: MEDICARE

## 2023-11-06 VITALS
DIASTOLIC BLOOD PRESSURE: 72 MMHG | WEIGHT: 256.8 LBS | HEART RATE: 67 BPM | BODY MASS INDEX: 47.26 KG/M2 | TEMPERATURE: 98.9 F | HEIGHT: 62 IN | RESPIRATION RATE: 16 BRPM | SYSTOLIC BLOOD PRESSURE: 118 MMHG | OXYGEN SATURATION: 98 %

## 2023-11-06 DIAGNOSIS — E11.22 TYPE 2 DIABETES MELLITUS WITH STAGE 2 CHRONIC KIDNEY DISEASE, WITHOUT LONG-TERM CURRENT USE OF INSULIN (HCC): Primary | ICD-10-CM

## 2023-11-06 DIAGNOSIS — R80.9 MICROALBUMINURIA DUE TO TYPE 2 DIABETES MELLITUS (HCC): ICD-10-CM

## 2023-11-06 DIAGNOSIS — M72.0 DUPUYTREN'S CONTRACTURE OF LEFT HAND: ICD-10-CM

## 2023-11-06 DIAGNOSIS — E78.00 HYPERCHOLESTEROLEMIA: ICD-10-CM

## 2023-11-06 DIAGNOSIS — N18.2 TYPE 2 DIABETES MELLITUS WITH STAGE 2 CHRONIC KIDNEY DISEASE, WITHOUT LONG-TERM CURRENT USE OF INSULIN (HCC): Primary | ICD-10-CM

## 2023-11-06 DIAGNOSIS — I10 PRIMARY HYPERTENSION: ICD-10-CM

## 2023-11-06 DIAGNOSIS — E53.8 VITAMIN B 12 DEFICIENCY: ICD-10-CM

## 2023-11-06 DIAGNOSIS — N18.2 TYPE 2 DIABETES MELLITUS WITH STAGE 2 CHRONIC KIDNEY DISEASE, WITHOUT LONG-TERM CURRENT USE OF INSULIN (HCC): ICD-10-CM

## 2023-11-06 DIAGNOSIS — E11.22 TYPE 2 DIABETES MELLITUS WITH STAGE 2 CHRONIC KIDNEY DISEASE, WITHOUT LONG-TERM CURRENT USE OF INSULIN (HCC): ICD-10-CM

## 2023-11-06 DIAGNOSIS — E11.29 MICROALBUMINURIA DUE TO TYPE 2 DIABETES MELLITUS (HCC): ICD-10-CM

## 2023-11-06 LAB
ALBUMIN SERPL BCG-MCNC: 3.8 G/DL (ref 3.5–5.1)
ALP SERPL-CCNC: 92 U/L (ref 38–126)
ALT SERPL W/O P-5'-P-CCNC: 13 U/L (ref 11–66)
ANION GAP SERPL CALC-SCNC: 8 MEQ/L (ref 8–16)
AST SERPL-CCNC: 15 U/L (ref 5–40)
BASOPHILS ABSOLUTE: 0 THOU/MM3 (ref 0–0.1)
BASOPHILS NFR BLD AUTO: 0.6 %
BILIRUB SERPL-MCNC: 0.3 MG/DL (ref 0.3–1.2)
BUN SERPL-MCNC: 14 MG/DL (ref 7–22)
CALCIUM SERPL-MCNC: 9.7 MG/DL (ref 8.5–10.5)
CHLORIDE SERPL-SCNC: 105 MEQ/L (ref 98–111)
CHOLEST SERPL-MCNC: 207 MG/DL (ref 100–199)
CO2 SERPL-SCNC: 26 MEQ/L (ref 23–33)
CREAT SERPL-MCNC: 0.8 MG/DL (ref 0.4–1.2)
CREAT UR-MCNC: 106.1 MG/DL
DEPRECATED MEAN GLUCOSE BLD GHB EST-ACNC: 141 MG/DL (ref 70–126)
DEPRECATED RDW RBC AUTO: 54.4 FL (ref 35–45)
EOSINOPHIL NFR BLD AUTO: 1 %
EOSINOPHILS ABSOLUTE: 0.1 THOU/MM3 (ref 0–0.4)
ERYTHROCYTE [DISTWIDTH] IN BLOOD BY AUTOMATED COUNT: 15.4 % (ref 11.5–14.5)
FOLATE SERPL-MCNC: 10.7 NG/ML (ref 4.8–24.2)
GFR SERPL CREATININE-BSD FRML MDRD: > 60 ML/MIN/1.73M2
GLUCOSE SERPL-MCNC: 130 MG/DL (ref 70–108)
HBA1C MFR BLD HPLC: 6.7 % (ref 4.4–6.4)
HCT VFR BLD AUTO: 43.5 % (ref 37–47)
HDLC SERPL-MCNC: 62 MG/DL
HGB BLD-MCNC: 13.7 GM/DL (ref 12–16)
IMM GRANULOCYTES # BLD AUTO: 0.01 THOU/MM3 (ref 0–0.07)
IMM GRANULOCYTES NFR BLD AUTO: 0.2 %
LDLC SERPL CALC-MCNC: 127 MG/DL
LYMPHOCYTES ABSOLUTE: 1.2 THOU/MM3 (ref 1–4.8)
LYMPHOCYTES NFR BLD AUTO: 22.9 %
MAGNESIUM SERPL-MCNC: 1.9 MG/DL (ref 1.6–2.4)
MCH RBC QN AUTO: 30.2 PG (ref 26–33)
MCHC RBC AUTO-ENTMCNC: 31.5 GM/DL (ref 32.2–35.5)
MCV RBC AUTO: 95.8 FL (ref 81–99)
MICROALBUMIN UR-MCNC: < 1.2 MG/DL
MICROALBUMIN/CREAT RATIO PNL UR: 11 MG/G (ref 0–30)
MONOCYTES ABSOLUTE: 0.5 THOU/MM3 (ref 0.4–1.3)
MONOCYTES NFR BLD AUTO: 9.9 %
NEUTROPHILS NFR BLD AUTO: 65.4 %
NRBC BLD AUTO-RTO: 0 /100 WBC
PLATELET # BLD AUTO: 354 THOU/MM3 (ref 130–400)
PMV BLD AUTO: 10.8 FL (ref 9.4–12.4)
POTASSIUM SERPL-SCNC: 4.3 MEQ/L (ref 3.5–5.2)
PROT SERPL-MCNC: 7.3 G/DL (ref 6.1–8)
RBC # BLD AUTO: 4.54 MILL/MM3 (ref 4.2–5.4)
SEGMENTED NEUTROPHILS ABSOLUTE COUNT: 3.3 THOU/MM3 (ref 1.8–7.7)
SODIUM SERPL-SCNC: 139 MEQ/L (ref 135–145)
T4 FREE SERPL-MCNC: 1.25 NG/DL (ref 0.93–1.76)
TRIGL SERPL-MCNC: 91 MG/DL (ref 0–199)
TSH SERPL DL<=0.005 MIU/L-ACNC: 2.13 UIU/ML (ref 0.4–4.2)
VIT B12 SERPL-MCNC: 1027 PG/ML (ref 211–911)
WBC # BLD AUTO: 5.1 THOU/MM3 (ref 4.8–10.8)

## 2023-11-06 PROCEDURE — 99214 OFFICE O/P EST MOD 30 MIN: CPT | Performed by: STUDENT IN AN ORGANIZED HEALTH CARE EDUCATION/TRAINING PROGRAM

## 2023-11-06 PROCEDURE — 3074F SYST BP LT 130 MM HG: CPT | Performed by: STUDENT IN AN ORGANIZED HEALTH CARE EDUCATION/TRAINING PROGRAM

## 2023-11-06 PROCEDURE — 3044F HG A1C LEVEL LT 7.0%: CPT | Performed by: STUDENT IN AN ORGANIZED HEALTH CARE EDUCATION/TRAINING PROGRAM

## 2023-11-06 PROCEDURE — 3078F DIAST BP <80 MM HG: CPT | Performed by: STUDENT IN AN ORGANIZED HEALTH CARE EDUCATION/TRAINING PROGRAM

## 2023-11-06 PROCEDURE — 1123F ACP DISCUSS/DSCN MKR DOCD: CPT | Performed by: STUDENT IN AN ORGANIZED HEALTH CARE EDUCATION/TRAINING PROGRAM

## 2023-11-06 RX ORDER — LANCETS
1 EACH MISCELLANEOUS 4 TIMES DAILY
Qty: 204 EACH | Refills: 1 | Status: SHIPPED | OUTPATIENT
Start: 2023-11-06 | End: 2023-12-06

## 2023-11-06 ASSESSMENT — ENCOUNTER SYMPTOMS
DIARRHEA: 0
NAUSEA: 0
ABDOMINAL PAIN: 0
VOMITING: 0
BACK PAIN: 0
CONSTIPATION: 0
SHORTNESS OF BREATH: 0
COUGH: 0
WHEEZING: 0

## 2023-11-07 ENCOUNTER — TELEPHONE (OUTPATIENT)
Dept: FAMILY MEDICINE CLINIC | Age: 73
End: 2023-11-07

## 2023-11-07 NOTE — TELEPHONE ENCOUNTER
----- Message from Jose Elias Mistry MD sent at 11/6/2023  9:53 PM EST -----  Benjdavey Her,    Your labs are overall stable. Your A1c is 6.7, which is slightly increased from prior. However, no need to adjust medications at this time. I recommend monitoring your diet and trying to limit high sugar foods. We will monitor in the future. Thanks! Please also mail patient a copy of results per request at last visit. Thank you!

## 2023-11-27 DIAGNOSIS — N18.2 CKD (CHRONIC KIDNEY DISEASE), STAGE II: ICD-10-CM

## 2023-11-27 RX ORDER — NIFEDIPINE 90 MG/1
TABLET, FILM COATED, EXTENDED RELEASE ORAL
Qty: 180 TABLET | Refills: 3 | Status: SHIPPED | OUTPATIENT
Start: 2023-11-27

## 2023-11-27 RX ORDER — LOSARTAN POTASSIUM 50 MG/1
TABLET ORAL
Qty: 180 TABLET | Refills: 3 | Status: SHIPPED | OUTPATIENT
Start: 2023-11-27

## 2023-11-27 RX ORDER — HYDRALAZINE HYDROCHLORIDE 50 MG/1
TABLET, FILM COATED ORAL
Qty: 180 TABLET | Refills: 3 | Status: SHIPPED | OUTPATIENT
Start: 2023-11-27

## 2023-11-28 NOTE — TELEPHONE ENCOUNTER
Patient's last appointment was : 11/6/2023 with our   Patient's next appointment is : 12/4/2023 with our Dr. Kerri Lan  Last refilled on: 10/30/2023  [facility]      Lab Results   Component Value Date    LABA1C 6.7 (H) 11/06/2023     Lab Results   Component Value Date    CHOL 207 (H) 11/06/2023    TRIG 91 11/06/2023    HDL 62 11/06/2023    LDLCALC 127 11/06/2023     Lab Results   Component Value Date     11/06/2023    K 4.3 11/06/2023     11/06/2023    CO2 26 11/06/2023    BUN 14 11/06/2023    CREATININE 0.8 11/06/2023    GLUCOSE 130 (H) 11/06/2023    CALCIUM 9.7 11/06/2023    PROT 7.3 11/06/2023    LABALBU 3.8 11/06/2023    BILITOT 0.3 11/06/2023    ALKPHOS 92 11/06/2023    AST 15 11/06/2023    ALT 13 11/06/2023    LABGLOM >60 11/06/2023     Lab Results   Component Value Date    TSH 2.130 11/06/2023    T4FREE 1.25 11/06/2023     Lab Results   Component Value Date    WBC 5.1 11/06/2023    HGB 13.7 11/06/2023    HCT 43.5 11/06/2023    MCV 95.8 11/06/2023     11/06/2023

## 2023-12-04 ENCOUNTER — OFFICE VISIT (OUTPATIENT)
Dept: FAMILY MEDICINE CLINIC | Age: 73
End: 2023-12-04

## 2023-12-04 VITALS
WEIGHT: 255.6 LBS | DIASTOLIC BLOOD PRESSURE: 72 MMHG | BODY MASS INDEX: 46.75 KG/M2 | SYSTOLIC BLOOD PRESSURE: 136 MMHG | HEART RATE: 66 BPM

## 2023-12-04 DIAGNOSIS — Z00.00 MEDICARE ANNUAL WELLNESS VISIT, SUBSEQUENT: Primary | ICD-10-CM

## 2023-12-04 RX ORDER — BLOOD SUGAR DIAGNOSTIC
STRIP MISCELLANEOUS
Qty: 100 EACH | Refills: 5 | Status: SHIPPED | OUTPATIENT
Start: 2023-12-04

## 2023-12-04 RX ORDER — BLOOD-GLUCOSE METER
EACH MISCELLANEOUS
Qty: 1 KIT | Refills: 0 | Status: SHIPPED | OUTPATIENT
Start: 2023-12-04

## 2023-12-04 ASSESSMENT — PATIENT HEALTH QUESTIONNAIRE - PHQ9
SUM OF ALL RESPONSES TO PHQ QUESTIONS 1-9: 0
SUM OF ALL RESPONSES TO PHQ9 QUESTIONS 1 & 2: 0
2. FEELING DOWN, DEPRESSED OR HOPELESS: 0
1. LITTLE INTEREST OR PLEASURE IN DOING THINGS: 0

## 2023-12-04 NOTE — PATIENT INSTRUCTIONS
Consider getting your Flu, COVID, Shingles and Prevnar 20 (pneumonia vaccines)    Schedule an eye exam. Consider a dental exam too    Schedule a Mammogram         A Healthy Heart: Care Instructions  Your Care Instructions     Coronary artery disease, also called heart disease, occurs when a substance called plaque builds up in the vessels that supply oxygen-rich blood to your heart muscle. This can narrow the blood vessels and reduce blood flow. A heart attack happens when blood flow is completely blocked. A high-fat diet, smoking, and other factors increase the risk of heart disease. Your doctor has found that you have a chance of having heart disease. You can do lots of things to keep your heart healthy. It may not be easy, but you can change your diet, exercise more, and quit smoking. These steps really work to lower your chance of heart disease. Follow-up care is a key part of your treatment and safety. Be sure to make and go to all appointments, and call your doctor if you are having problems. It's also a good idea to know your test results and keep a list of the medicines you take. How can you care for yourself at home? Diet    Use less salt when you cook and eat. This helps lower your blood pressure. Taste food before salting. Add only a little salt when you think you need it. With time, your taste buds will adjust to less salt. Eat fewer snack items, fast foods, canned soups, and other high-salt, high-fat, processed foods. Read food labels and try to avoid saturated and trans fats. They increase your risk of heart disease by raising cholesterol levels. Limit the amount of solid fat-butter, margarine, and shortening-you eat. Use olive, peanut, or canola oil when you cook. Bake, broil, and steam foods instead of frying them. Eat a variety of fruit and vegetables every day. Dark green, deep orange, red, or yellow fruits and vegetables are especially good for you.  Examples include spinach,

## 2024-01-31 NOTE — TELEPHONE ENCOUNTER
[facility] over a 90 day refill request for Farxiga 10mg. Was last refilled 4/27/2023 with a 1 year supply, medication refill request is too soon.

## 2024-02-02 DIAGNOSIS — I10 PRIMARY HYPERTENSION: Primary | ICD-10-CM

## 2024-02-02 RX ORDER — METOPROLOL TARTRATE 100 MG/1
100 TABLET ORAL 2 TIMES DAILY
Qty: 180 TABLET | Refills: 3 | Status: SHIPPED | OUTPATIENT
Start: 2024-02-02

## 2024-02-13 DIAGNOSIS — N18.2 TYPE 2 DIABETES MELLITUS WITH STAGE 2 CHRONIC KIDNEY DISEASE, WITHOUT LONG-TERM CURRENT USE OF INSULIN (HCC): ICD-10-CM

## 2024-02-13 DIAGNOSIS — E11.22 TYPE 2 DIABETES MELLITUS WITH STAGE 2 CHRONIC KIDNEY DISEASE, WITHOUT LONG-TERM CURRENT USE OF INSULIN (HCC): ICD-10-CM

## 2024-02-13 NOTE — TELEPHONE ENCOUNTER
Patient's last appointment was : 12/4/2023 with our   Patient's next appointment is : 5/9/2024 with our Dr. Santos  Last refilled on: 4/27/2023  Which [facility] does the script [facility]      Lab Results   Component Value Date    LABA1C 6.7 (H) 11/06/2023     Lab Results   Component Value Date    CHOL 207 (H) 11/06/2023    TRIG 91 11/06/2023    HDL 62 11/06/2023    LDLCALC 127 11/06/2023     Lab Results   Component Value Date     11/06/2023    K 4.3 11/06/2023     11/06/2023    CO2 26 11/06/2023    BUN 14 11/06/2023    CREATININE 0.8 11/06/2023    GLUCOSE 130 (H) 11/06/2023    CALCIUM 9.7 11/06/2023    PROT 7.3 11/06/2023    LABALBU 3.8 11/06/2023    BILITOT 0.3 11/06/2023    ALKPHOS 92 11/06/2023    AST 15 11/06/2023    ALT 13 11/06/2023    LABGLOM >60 11/06/2023     Lab Results   Component Value Date    TSH 2.130 11/06/2023    T4FREE 1.25 11/06/2023     Lab Results   Component Value Date    WBC 5.1 11/06/2023    HGB 13.7 11/06/2023    HCT 43.5 11/06/2023    MCV 95.8 11/06/2023     11/06/2023

## 2024-04-25 DIAGNOSIS — N18.2 CKD (CHRONIC KIDNEY DISEASE), STAGE II: ICD-10-CM

## 2024-04-25 DIAGNOSIS — I10 PRIMARY HYPERTENSION: Primary | ICD-10-CM

## 2024-04-30 ENCOUNTER — NURSE ONLY (OUTPATIENT)
Dept: LAB | Age: 74
End: 2024-04-30

## 2024-04-30 DIAGNOSIS — I10 PRIMARY HYPERTENSION: ICD-10-CM

## 2024-04-30 DIAGNOSIS — N18.2 CKD (CHRONIC KIDNEY DISEASE), STAGE II: ICD-10-CM

## 2024-04-30 LAB
ANION GAP SERPL CALC-SCNC: 10 MEQ/L (ref 8–16)
BACTERIA: ABNORMAL
BILIRUB UR QL STRIP: NEGATIVE
BUN SERPL-MCNC: 12 MG/DL (ref 7–22)
CALCIUM SERPL-MCNC: 9.7 MG/DL (ref 8.5–10.5)
CASTS #/AREA URNS LPF: ABNORMAL /LPF
CASTS #/AREA URNS LPF: ABNORMAL /LPF
CHARACTER UR: ABNORMAL
CHARCOAL URNS QL MICRO: ABNORMAL
CHLORIDE SERPL-SCNC: 103 MEQ/L (ref 98–111)
CO2 SERPL-SCNC: 25 MEQ/L (ref 23–33)
COLOR UR: YELLOW
CREAT SERPL-MCNC: 0.8 MG/DL (ref 0.4–1.2)
CRYSTALS URNS QL MICRO: ABNORMAL
EPITHELIAL CELLS, UA: ABNORMAL /HPF
GFR SERPL CREATININE-BSD FRML MDRD: 78 ML/MIN/1.73M2
GLUCOSE SERPL-MCNC: 115 MG/DL (ref 70–108)
GLUCOSE UR QL STRIP.AUTO: >= 1000 MG/DL
HGB UR QL STRIP.AUTO: NEGATIVE
KETONES UR QL STRIP.AUTO: NEGATIVE
LEUKOCYTE ESTERASE UR QL STRIP.AUTO: ABNORMAL
NITRITE UR QL STRIP.AUTO: POSITIVE
PH UR STRIP.AUTO: 5.5 [PH] (ref 5–9)
POTASSIUM SERPL-SCNC: 4.3 MEQ/L (ref 3.5–5.2)
PROT UR STRIP.AUTO-MCNC: NEGATIVE MG/DL
RBC #/AREA URNS HPF: ABNORMAL /HPF
RENAL EPI CELLS #/AREA URNS HPF: ABNORMAL /[HPF]
SODIUM SERPL-SCNC: 138 MEQ/L (ref 135–145)
SP GR UR REFRACT.AUTO: 1.02 (ref 1–1.03)
UROBILINOGEN UR QL STRIP.AUTO: 0.2 EU/DL (ref 0–1)
WBC #/AREA URNS HPF: ABNORMAL /HPF
YEAST LIKE FUNGI URNS QL MICRO: ABNORMAL

## 2024-05-02 ENCOUNTER — OFFICE VISIT (OUTPATIENT)
Dept: NEPHROLOGY | Age: 74
End: 2024-05-02
Payer: MEDICARE

## 2024-05-02 VITALS
DIASTOLIC BLOOD PRESSURE: 76 MMHG | BODY MASS INDEX: 46.56 KG/M2 | SYSTOLIC BLOOD PRESSURE: 133 MMHG | HEIGHT: 62 IN | WEIGHT: 253 LBS | OXYGEN SATURATION: 97 % | HEART RATE: 73 BPM

## 2024-05-02 DIAGNOSIS — I10 PRIMARY HYPERTENSION: Primary | ICD-10-CM

## 2024-05-02 PROCEDURE — 1123F ACP DISCUSS/DSCN MKR DOCD: CPT | Performed by: INTERNAL MEDICINE

## 2024-05-02 PROCEDURE — 3078F DIAST BP <80 MM HG: CPT | Performed by: INTERNAL MEDICINE

## 2024-05-02 PROCEDURE — 3075F SYST BP GE 130 - 139MM HG: CPT | Performed by: INTERNAL MEDICINE

## 2024-05-02 PROCEDURE — 99213 OFFICE O/P EST LOW 20 MIN: CPT | Performed by: INTERNAL MEDICINE

## 2024-05-02 NOTE — PROGRESS NOTES
[facility]    [address], [address]   [place] [address]  [phone]  Progress Note  5/2/2024 9:07 AM        Pt Name:    Cristina Elmore  YOB: 1950  Primary Care Physician:  Sara Stewart MD     Chief Complaint:   Chief Complaint   Patient presents with    Follow-up: essential HTN        Background Information/Interval History:   74 yo AAF with hx obesity, HTN for over 30+ year. She has had DM for few years and takes metformin.  Her Mother was on dialysis who passed away in early 1990s. She reports hx TIA several years ago.  She had Right knee  Replacement Nov 2017. She took some NSAIDs in the past. She still takes it occasionally for chronic back pain.  Pt reports her brother was diagnosed with amyloidosis and had heart and kidney transplant.     She is here for 1 year follow-up. She feels well. No new complaints. Sugars are okay per pt. Says BP has been staying stable.      Past History:  Past Medical History:   Diagnosis Date    Hyperlipidemia 2009    Hypertension 1973    TIA (transient ischemic attack) 2003    Type II or unspecified type diabetes mellitus without mention of complication, not stated as uncontrolled 2014     Past Surgical History:   Procedure Laterality Date    TOTAL KNEE ARTHROPLASTY Bilateral 11/13/2017    [facility] DR SOLIMAN        VITALS:  /76 (Site: Right Upper Arm, Position: Sitting, Cuff Size: Large Adult)   Pulse 73   Ht 1.575 m (5' 2\")   Wt 114.8 kg (253 lb)   SpO2 97%   BMI 46.27 kg/m²   Wt Readings from Last 3 Encounters:   05/02/24 114.8 kg (253 lb)   12/21/23 118.4 kg (261 lb)   12/04/23 115.9 kg (255 lb 9.6 oz)     Body mass index is 46.27 kg/m².     General Appearance: alert and cooperative with exam, appears comfortable, no distress  Oral: moist oral mucus membranes  Neck: No jugular venous distention  Lungs: Air entry B/L, no crackles or rales, no use of accessory muscles  Heart: S1, S2 heard  GI:

## 2024-05-22 NOTE — TELEPHONE ENCOUNTER
Patient's last appointment was : 12/4/2023 with our    Patient's next appointment is : 6/3/2024 with our Dr. Santos   Last refilled on: 12-4-23  Which pharmacy does the script need sent to: [facility]      Lab Results   Component Value Date    LABA1C 6.7 (H) 11/06/2023     Lab Results   Component Value Date    CHOL 207 (H) 11/06/2023    TRIG 91 11/06/2023    HDL 62 11/06/2023     Lab Results   Component Value Date     04/30/2024    K 4.3 04/30/2024     04/30/2024    CO2 25 04/30/2024    BUN 12 04/30/2024    CREATININE 0.8 04/30/2024    GLUCOSE 115 (H) 04/30/2024    CALCIUM 9.7 04/30/2024    BILITOT 0.3 11/06/2023    ALKPHOS 92 11/06/2023    AST 15 11/06/2023    ALT 13 11/06/2023    LABGLOM 78 04/30/2024     Lab Results   Component Value Date    TSH 2.130 11/06/2023    T4FREE 1.25 11/06/2023     Lab Results   Component Value Date    WBC 5.1 11/06/2023    HGB 13.7 11/06/2023    HCT 43.5 11/06/2023    MCV 95.8 11/06/2023     11/06/2023         Alert and oriented to person, place and time, memory intact, behavior appropriate to situation, PERRL.

## 2024-05-30 ASSESSMENT — PATIENT HEALTH QUESTIONNAIRE - PHQ9
SUM OF ALL RESPONSES TO PHQ QUESTIONS 1-9: 0
SUM OF ALL RESPONSES TO PHQ QUESTIONS 1-9: 0
1. LITTLE INTEREST OR PLEASURE IN DOING THINGS: NOT AT ALL
SUM OF ALL RESPONSES TO PHQ9 QUESTIONS 1 & 2: 0
SUM OF ALL RESPONSES TO PHQ QUESTIONS 1-9: 0
2. FEELING DOWN, DEPRESSED OR HOPELESS: NOT AT ALL
SUM OF ALL RESPONSES TO PHQ9 QUESTIONS 1 & 2: 0
2. FEELING DOWN, DEPRESSED OR HOPELESS: NOT AT ALL
SUM OF ALL RESPONSES TO PHQ QUESTIONS 1-9: 0
1. LITTLE INTEREST OR PLEASURE IN DOING THINGS: NOT AT ALL

## 2024-06-03 ENCOUNTER — OFFICE VISIT (OUTPATIENT)
Dept: FAMILY MEDICINE CLINIC | Age: 74
End: 2024-06-03
Payer: MEDICARE

## 2024-06-03 VITALS
HEIGHT: 62 IN | RESPIRATION RATE: 18 BRPM | WEIGHT: 252.2 LBS | OXYGEN SATURATION: 98 % | BODY MASS INDEX: 46.41 KG/M2 | HEART RATE: 66 BPM | SYSTOLIC BLOOD PRESSURE: 120 MMHG | TEMPERATURE: 97.6 F | DIASTOLIC BLOOD PRESSURE: 72 MMHG

## 2024-06-03 DIAGNOSIS — I10 PRIMARY HYPERTENSION: ICD-10-CM

## 2024-06-03 DIAGNOSIS — N18.2 TYPE 2 DIABETES MELLITUS WITH STAGE 2 CHRONIC KIDNEY DISEASE, WITHOUT LONG-TERM CURRENT USE OF INSULIN (HCC): Primary | ICD-10-CM

## 2024-06-03 DIAGNOSIS — E11.29 MICROALBUMINURIA DUE TO TYPE 2 DIABETES MELLITUS (HCC): ICD-10-CM

## 2024-06-03 DIAGNOSIS — E11.22 TYPE 2 DIABETES MELLITUS WITH STAGE 2 CHRONIC KIDNEY DISEASE, WITHOUT LONG-TERM CURRENT USE OF INSULIN (HCC): Primary | ICD-10-CM

## 2024-06-03 DIAGNOSIS — R80.9 MICROALBUMINURIA DUE TO TYPE 2 DIABETES MELLITUS (HCC): ICD-10-CM

## 2024-06-03 DIAGNOSIS — E78.00 HYPERCHOLESTEROLEMIA: ICD-10-CM

## 2024-06-03 LAB
HBA1C MFR BLD: 6.3 %
HBA1C MFR BLD: 6.3 % (ref 4.3–5.7)

## 2024-06-03 PROCEDURE — 1123F ACP DISCUSS/DSCN MKR DOCD: CPT | Performed by: STUDENT IN AN ORGANIZED HEALTH CARE EDUCATION/TRAINING PROGRAM

## 2024-06-03 PROCEDURE — 3074F SYST BP LT 130 MM HG: CPT | Performed by: STUDENT IN AN ORGANIZED HEALTH CARE EDUCATION/TRAINING PROGRAM

## 2024-06-03 PROCEDURE — 3044F HG A1C LEVEL LT 7.0%: CPT | Performed by: STUDENT IN AN ORGANIZED HEALTH CARE EDUCATION/TRAINING PROGRAM

## 2024-06-03 PROCEDURE — 99214 OFFICE O/P EST MOD 30 MIN: CPT | Performed by: STUDENT IN AN ORGANIZED HEALTH CARE EDUCATION/TRAINING PROGRAM

## 2024-06-03 PROCEDURE — 3078F DIAST BP <80 MM HG: CPT | Performed by: STUDENT IN AN ORGANIZED HEALTH CARE EDUCATION/TRAINING PROGRAM

## 2024-06-03 PROCEDURE — 83036 HEMOGLOBIN GLYCOSYLATED A1C: CPT | Performed by: FAMILY MEDICINE

## 2024-06-03 RX ORDER — ATORVASTATIN CALCIUM 80 MG/1
80 TABLET, FILM COATED ORAL DAILY
Qty: 90 TABLET | Refills: 3 | Status: SHIPPED | OUTPATIENT
Start: 2024-06-03

## 2024-06-03 SDOH — ECONOMIC STABILITY: FOOD INSECURITY: WITHIN THE PAST 12 MONTHS, THE FOOD YOU BOUGHT JUST DIDN'T LAST AND YOU DIDN'T HAVE MONEY TO GET MORE.: NEVER TRUE

## 2024-06-03 SDOH — ECONOMIC STABILITY: INCOME INSECURITY: HOW HARD IS IT FOR YOU TO PAY FOR THE VERY BASICS LIKE FOOD, HOUSING, MEDICAL CARE, AND HEATING?: NOT HARD AT ALL

## 2024-06-03 SDOH — ECONOMIC STABILITY: FOOD INSECURITY: WITHIN THE PAST 12 MONTHS, YOU WORRIED THAT YOUR FOOD WOULD RUN OUT BEFORE YOU GOT MONEY TO BUY MORE.: NEVER TRUE

## 2024-06-03 ASSESSMENT — ENCOUNTER SYMPTOMS
VOMITING: 0
DIARRHEA: 0
NAUSEA: 0
COUGH: 0
BACK PAIN: 0
CONSTIPATION: 0
WHEEZING: 0
SHORTNESS OF BREATH: 0
ABDOMINAL PAIN: 0

## 2024-06-03 NOTE — PROGRESS NOTES
S: 73 y.o. female with   Chief Complaint   Patient presents with    6 Month Follow-Up     DM ii -- well controlled. On farxiga, metformin  Statin + ASA, h/o TIA  HTN -- nephrology helps manage due to h/o resistant HTN, needing various medications to control.   Microalbumin well controlled     BP Readings from Last 3 Encounters:   06/03/24 120/72   05/02/24 133/76   12/21/23 134/70     Wt Readings from Last 3 Encounters:   06/03/24 114.4 kg (252 lb 3.2 oz)   05/02/24 114.8 kg (253 lb)   12/21/23 118.4 kg (261 lb)     Lab Results   Component Value Date    MALBCR 11 11/06/2023       O: VS:   Vitals:    06/03/24 0954   BP: 120/72   Site: Left Upper Arm   Position: Sitting   Cuff Size: Large Adult   Pulse: 66   Resp: 18   Temp: 97.6 °F (36.4 °C)   TempSrc: Temporal   SpO2: 98%   Weight: 114.4 kg (252 lb 3.2 oz)   Height: 1.575 m (5' 2\")     Body mass index is 46.13 kg/m².    AAO/NAD, appropriate affect for mood  Normocephalic, atraumatic, eyes - conjunctiva and sclera normal,   skin no rashes on exposed areas   Insight, judgement normal and in no acute distress    Lab Results   Component Value Date    WBC 5.1 11/06/2023    HGB 13.7 11/06/2023    HCT 43.5 11/06/2023     11/06/2023    CHOL 207 (H) 11/06/2023    TRIG 91 11/06/2023    HDL 62 11/06/2023     11/06/2023    AST 15 11/06/2023     04/30/2024    K 4.3 04/30/2024     04/30/2024    CREATININE 0.8 04/30/2024    BUN 12 04/30/2024    CO2 25 04/30/2024    TSH 2.130 11/06/2023    LABA1C 6.7 (H) 11/06/2023    LABGLOM 78 04/30/2024    MG 1.9 11/06/2023    CALCIUM 9.7 04/30/2024    VITD25 49 09/27/2022       No results found.         Diagnosis Orders   1. Type 2 diabetes mellitus with stage 2 chronic kidney disease, without long-term current use of insulin (HCC)  POCT glycosylated hemoglobin (Hb A1C)    Hemoglobin A1C    TSH with Reflex    Microalbumin / Creatinine Urine Ratio      2. Microalbuminuria due to type 2 diabetes mellitus (HCC)        3. 
Tenderness: There is no abdominal tenderness.   Musculoskeletal:      Cervical back: Neck supple.      Right lower leg: Edema (trace) present.      Left lower leg: Edema (trace) present.   Skin:     General: Skin is warm and dry.   Neurological:      General: No focal deficit present.      Mental Status: She is alert and oriented to person, place, and time.   Psychiatric:         Mood and Affect: Mood normal.         Behavior: Behavior normal.         Immunization History   Administered Date(s) Administered    Pneumococcal, PCV-13, PREVNAR 13, (age 6w+), IM, 0.5mL 01/14/2019    Pneumococcal, PPSV23, PNEUMOVAX 23, (age 2y+), SC/IM, 0.5mL 03/01/2021    TDaP, ADACEL (age 10y-64y), BOOSTRIX (age 10y+), IM, 0.5mL 01/14/2019       Health Maintenance Due   Topic Date Due    COVID-19 Vaccine (1) Never done    Shingles vaccine (1 of 2) Never done    Respiratory Syncytial Virus (RSV) Pregnant or age 60 yrs+ (1 - 1-dose 60+ series) Never done    Diabetic retinal exam  02/07/2020    Breast cancer screen  04/20/2023    Annual Wellness Visit (Medicare Advantage)  01/01/2024       Food Insecurity: No Food Insecurity (6/3/2024)    Hunger Vital Sign     Worried About Running Out of Food in the Last Year: Never true     Ran Out of Food in the Last Year: Never true       Assessment / Plan:   1. Type 2 diabetes mellitus with stage 2 chronic kidney disease, without long-term current use of insulin (HCC)  Chronic, stable.  A1c 6.3 today.  Continue current dose metformin and Farxiga.  - POCT glycosylated hemoglobin (Hb A1C)  - Hemoglobin A1C; Future  - TSH with Reflex; Future  - Microalbumin / Creatinine Urine Ratio; Future    2. Microalbuminuria due to type 2 diabetes mellitus (HCC)  Chronic, stable.  Continue losartan.  Recheck prior to next visit.    3. Primary hypertension  Chronic, stable.  Continue current dose hydralazine, HCTZ, losartan, Lopressor, nifedipine.  Follow-up with nephrology as scheduled.  - Comprehensive Metabolic

## 2024-10-24 RX ORDER — HYDROCHLOROTHIAZIDE 25 MG/1
25 TABLET ORAL DAILY
Qty: 90 TABLET | Refills: 3 | Status: SHIPPED | OUTPATIENT
Start: 2024-10-24

## 2024-10-31 NOTE — TELEPHONE ENCOUNTER
Patient's last appointment was: 6/3/2024  with our   Patient's next appointment is: 12/4/2024  with our Dr. Stewart  Last refilled on: 5/23/2024  Which pharmacy does the script need sent to: [facility]      Lab Results   Component Value Date    LABA1C 6.3 06/03/2024     Lab Results   Component Value Date    CHOL 207 (H) 11/06/2023    TRIG 91 11/06/2023    HDL 62 11/06/2023     Lab Results   Component Value Date     04/30/2024    K 4.3 04/30/2024     04/30/2024    CO2 25 04/30/2024    BUN 12 04/30/2024    CREATININE 0.8 04/30/2024    GLUCOSE 115 (H) 04/30/2024    CALCIUM 9.7 04/30/2024    BILITOT 0.3 11/06/2023    ALKPHOS 92 11/06/2023    AST 15 11/06/2023    ALT 13 11/06/2023    LABGLOM 78 04/30/2024     Lab Results   Component Value Date    TSH 2.130 11/06/2023    T4FREE 1.25 11/06/2023     Lab Results   Component Value Date    WBC 5.1 11/06/2023    HGB 13.7 11/06/2023    HCT 43.5 11/06/2023    MCV 95.8 11/06/2023     11/06/2023

## 2024-11-19 DIAGNOSIS — N18.2 CKD (CHRONIC KIDNEY DISEASE), STAGE II: ICD-10-CM

## 2024-11-19 RX ORDER — LOSARTAN POTASSIUM 50 MG/1
TABLET ORAL
Qty: 180 TABLET | Refills: 3 | Status: SHIPPED | OUTPATIENT
Start: 2024-11-19

## 2024-11-19 RX ORDER — HYDRALAZINE HYDROCHLORIDE 50 MG/1
TABLET, FILM COATED ORAL
Qty: 180 TABLET | Refills: 3 | Status: SHIPPED | OUTPATIENT
Start: 2024-11-19

## 2024-11-19 RX ORDER — NIFEDIPINE 90 MG/1
TABLET, FILM COATED, EXTENDED RELEASE ORAL
Qty: 180 TABLET | Refills: 3 | Status: SHIPPED | OUTPATIENT
Start: 2024-11-19

## 2024-12-03 ENCOUNTER — TELEPHONE (OUTPATIENT)
Dept: FAMILY MEDICINE CLINIC | Age: 74
End: 2024-12-03

## 2024-12-03 NOTE — TELEPHONE ENCOUNTER
----- Message from David SHINE sent at 12/3/2024 11:18 AM EST -----  Regarding: E[facility] Appointment Request  E[facility] Appointment Request    Patient needs appointment for E[facility] Appointment Type: Annual Visit.    Patient Requested Dates(s): somewhere in January  Patient Requested Time: Afternoon  Provider Name: Sara Stewart MD        Reason for Appointment Request: Established Patient - Available appointments did not meet patient need  --------------------------------------------------------------------------------------------------------------------------    Relationship to Patient: Self     Call Back Information: OK to leave message on voicemail  Preferred Call Back Number: Phone [phone]

## 2025-01-13 ENCOUNTER — TELEMEDICINE (OUTPATIENT)
Dept: FAMILY MEDICINE CLINIC | Age: 75
End: 2025-01-13

## 2025-01-13 DIAGNOSIS — E55.9 VITAMIN D DEFICIENCY: ICD-10-CM

## 2025-01-13 DIAGNOSIS — N18.2 TYPE 2 DIABETES MELLITUS WITH STAGE 2 CHRONIC KIDNEY DISEASE, WITHOUT LONG-TERM CURRENT USE OF INSULIN (HCC): ICD-10-CM

## 2025-01-13 DIAGNOSIS — R20.2 TINGLING OF UPPER EXTREMITY: ICD-10-CM

## 2025-01-13 DIAGNOSIS — I10 PRIMARY HYPERTENSION: Primary | ICD-10-CM

## 2025-01-13 DIAGNOSIS — M65.339 TRIGGER MIDDLE FINGER, UNSPECIFIED LATERALITY: ICD-10-CM

## 2025-01-13 DIAGNOSIS — E11.22 TYPE 2 DIABETES MELLITUS WITH STAGE 2 CHRONIC KIDNEY DISEASE, WITHOUT LONG-TERM CURRENT USE OF INSULIN (HCC): ICD-10-CM

## 2025-01-13 DIAGNOSIS — E53.8 VITAMIN B 12 DEFICIENCY: ICD-10-CM

## 2025-01-13 DIAGNOSIS — E78.00 HYPERCHOLESTEROLEMIA: ICD-10-CM

## 2025-01-13 SDOH — ECONOMIC STABILITY: TRANSPORTATION INSECURITY
IN THE PAST 12 MONTHS, HAS LACK OF TRANSPORTATION KEPT YOU FROM MEETINGS, WORK, OR FROM GETTING THINGS NEEDED FOR DAILY LIVING?: NO

## 2025-01-13 SDOH — ECONOMIC STABILITY: TRANSPORTATION INSECURITY
IN THE PAST 12 MONTHS, HAS THE LACK OF TRANSPORTATION KEPT YOU FROM MEDICAL APPOINTMENTS OR FROM GETTING MEDICATIONS?: NO

## 2025-01-13 SDOH — ECONOMIC STABILITY: INCOME INSECURITY: IN THE LAST 12 MONTHS, WAS THERE A TIME WHEN YOU WERE NOT ABLE TO PAY THE MORTGAGE OR RENT ON TIME?: NO

## 2025-01-13 SDOH — ECONOMIC STABILITY: FOOD INSECURITY: WITHIN THE PAST 12 MONTHS, THE FOOD YOU BOUGHT JUST DIDN'T LAST AND YOU DIDN'T HAVE MONEY TO GET MORE.: NEVER TRUE

## 2025-01-13 SDOH — ECONOMIC STABILITY: FOOD INSECURITY: WITHIN THE PAST 12 MONTHS, YOU WORRIED THAT YOUR FOOD WOULD RUN OUT BEFORE YOU GOT MONEY TO BUY MORE.: NEVER TRUE

## 2025-01-13 NOTE — PROGRESS NOTES
2025    TELEHEALTH EVALUATION -- Audio/Visual    HPI:    Cristina Elmore (:  1950) has requested an audio/video evaluation for the following concern(s):      Diabetes Mellitus: she is taking  Metformin 850  mg p.o. twice daily and Farxiga 10 mg 1 tablet PO daily,.  Her Hb A1c was 6.4% in 2024.  She denies any side effects from the medication.  She is up to date on Pneumovax, Tdap and Prevnar.  She does not get the influenza vaccine. She does not want to get the Covid vaccine either.         Hypertension:  she is taking Losartan 50 mg 1 PO BID, HCTZ 25 mg 1 PO daily as needed, Hydralazine 50 mg 1 tablet PO BID, Nifedipine CC 90 mg 1 PO BID, and Metoprolol 100 mg 1 PO BID.  She denies any side effects from the medications.  She denies chest pain, dyspnea, exertional chest pressure/discomfort, fatigue, irregular heart beat, lower extremity edema, near-syncope, orthopnea, palpitations and syncope.  Cardiovascular risk factors: advanced age (older than 55 for men, 65 for women), diabetes mellitus, dyslipidemia, hypertension, microalbuminuria, obesity (BMI >= 30 kg/m2) and sedentary lifestyle. Use of agents associated with hypertension: none. History of target organ damage: transient ischemic attack.     Hyperlipidemia:   She is taking aAorvastatin 80 mg 1 tablet mouth daily and denies any side effects from the medications.  Last blood test performed 2021 show a total cholesterol 209, triglycerides 90,  and HDL 69.  There is a family history of hyperlipidemia. There is not a family history of early ischemia heart disease.She tries to follow low cholesterol diet, but he does not exercises.       CKD II/Microalbuminuria: she is taking Losartan 50 mg PO BID per Dr. Kovacs, her nephrologist, since 2018.  Originaly it was 220, but it came down to 11 in 2023.   Last visit with him was in May 2024.  She sees him yearly.     Vitamin B 12 deficiency: She is taking 1000 mcg

## 2025-01-22 DIAGNOSIS — E11.22 TYPE 2 DIABETES MELLITUS WITH STAGE 2 CHRONIC KIDNEY DISEASE, WITHOUT LONG-TERM CURRENT USE OF INSULIN (HCC): ICD-10-CM

## 2025-01-22 DIAGNOSIS — N18.2 TYPE 2 DIABETES MELLITUS WITH STAGE 2 CHRONIC KIDNEY DISEASE, WITHOUT LONG-TERM CURRENT USE OF INSULIN (HCC): ICD-10-CM

## 2025-01-22 RX ORDER — DAPAGLIFLOZIN 10 MG/1
TABLET, FILM COATED ORAL
Qty: 90 TABLET | Refills: 3 | Status: SHIPPED | OUTPATIENT
Start: 2025-01-22

## 2025-01-22 NOTE — TELEPHONE ENCOUNTER
Patient's last appointment was: 1/13/2025  with our   Patient's next appointment is: Visit date not found  with our    Last refilled on: 10/25/2024   Which pharmacy does the script need sent to:   [facility] - [place], [place]          Lab Results   Component Value Date    LABA1C 6.3 06/03/2024     Lab Results   Component Value Date    CHOL 207 (H) 11/06/2023    TRIG 91 11/06/2023    HDL 62 11/06/2023     Lab Results   Component Value Date     04/30/2024    K 4.3 04/30/2024     04/30/2024    CO2 25 04/30/2024    BUN 12 04/30/2024    CREATININE 0.8 04/30/2024    GLUCOSE 115 (H) 04/30/2024    CALCIUM 9.7 04/30/2024    BILITOT 0.3 11/06/2023    ALKPHOS 92 11/06/2023    AST 15 11/06/2023    ALT 13 11/06/2023    LABGLOM 78 04/30/2024     Lab Results   Component Value Date    TSH 2.130 11/06/2023    T4FREE 1.25 11/06/2023     Lab Results   Component Value Date    WBC 5.1 11/06/2023    HGB 13.7 11/06/2023    HCT 43.5 11/06/2023    MCV 95.8 11/06/2023     11/06/2023

## 2025-01-27 DIAGNOSIS — I10 PRIMARY HYPERTENSION: ICD-10-CM

## 2025-01-27 RX ORDER — METOPROLOL TARTRATE 100 MG/1
100 TABLET ORAL 2 TIMES DAILY
Qty: 180 TABLET | Refills: 3 | Status: SHIPPED | OUTPATIENT
Start: 2025-01-27

## 2025-04-10 ENCOUNTER — LAB (OUTPATIENT)
Dept: LAB | Age: 75
End: 2025-04-10

## 2025-04-10 DIAGNOSIS — N18.2 TYPE 2 DIABETES MELLITUS WITH STAGE 2 CHRONIC KIDNEY DISEASE, WITHOUT LONG-TERM CURRENT USE OF INSULIN (HCC): ICD-10-CM

## 2025-04-10 DIAGNOSIS — I10 PRIMARY HYPERTENSION: ICD-10-CM

## 2025-04-10 DIAGNOSIS — E55.9 VITAMIN D DEFICIENCY: ICD-10-CM

## 2025-04-10 DIAGNOSIS — E11.22 TYPE 2 DIABETES MELLITUS WITH STAGE 2 CHRONIC KIDNEY DISEASE, WITHOUT LONG-TERM CURRENT USE OF INSULIN (HCC): ICD-10-CM

## 2025-04-10 DIAGNOSIS — E53.8 VITAMIN B 12 DEFICIENCY: ICD-10-CM

## 2025-04-10 DIAGNOSIS — E78.00 HYPERCHOLESTEROLEMIA: ICD-10-CM

## 2025-04-10 LAB
25(OH)D3 SERPL-MCNC: 39 NG/ML (ref 30–100)
ALBUMIN SERPL BCG-MCNC: 3.7 G/DL (ref 3.4–4.9)
ALP SERPL-CCNC: 92 U/L (ref 38–126)
ALT SERPL W/O P-5'-P-CCNC: 8 U/L (ref 10–35)
ANION GAP SERPL CALC-SCNC: 13 MEQ/L (ref 8–16)
AST SERPL-CCNC: 16 U/L (ref 10–35)
BASOPHILS ABSOLUTE: 0 THOU/MM3 (ref 0–0.1)
BASOPHILS NFR BLD AUTO: 0.5 %
BILIRUB SERPL-MCNC: < 0.2 MG/DL (ref 0.3–1.2)
BUN SERPL-MCNC: 17 MG/DL (ref 8–23)
CALCIUM SERPL-MCNC: 9.9 MG/DL (ref 8.8–10.2)
CHLORIDE SERPL-SCNC: 106 MEQ/L (ref 98–111)
CHOLEST SERPL-MCNC: 331 MG/DL (ref 100–199)
CO2 SERPL-SCNC: 21 MEQ/L (ref 22–29)
CREAT SERPL-MCNC: 0.9 MG/DL (ref 0.5–0.9)
DEPRECATED MEAN GLUCOSE BLD GHB EST-ACNC: 132 MG/DL (ref 70–126)
DEPRECATED RDW RBC AUTO: 52.5 FL (ref 35–45)
EOSINOPHIL NFR BLD AUTO: 0.9 %
EOSINOPHILS ABSOLUTE: 0.1 THOU/MM3 (ref 0–0.4)
ERYTHROCYTE [DISTWIDTH] IN BLOOD BY AUTOMATED COUNT: 15 % (ref 11.5–14.5)
GFR SERPL CREATININE-BSD FRML MDRD: 67 ML/MIN/1.73M2
GLUCOSE SERPL-MCNC: 139 MG/DL (ref 74–109)
HBA1C MFR BLD HPLC: 6.4 % (ref 4–6)
HCT VFR BLD AUTO: 44.3 % (ref 37–47)
HDLC SERPL-MCNC: 57 MG/DL
HGB BLD-MCNC: 14.3 GM/DL (ref 12–16)
IMM GRANULOCYTES # BLD AUTO: 0.01 THOU/MM3 (ref 0–0.07)
IMM GRANULOCYTES NFR BLD AUTO: 0.2 %
LDLC SERPL CALC-MCNC: 253 MG/DL
LYMPHOCYTES ABSOLUTE: 1.1 THOU/MM3 (ref 1–4.8)
LYMPHOCYTES NFR BLD AUTO: 19.3 %
MAGNESIUM SERPL-MCNC: 2 MG/DL (ref 1.6–2.6)
MCH RBC QN AUTO: 30.7 PG (ref 26–33)
MCHC RBC AUTO-ENTMCNC: 32.3 GM/DL (ref 32.2–35.5)
MCV RBC AUTO: 95.1 FL (ref 81–99)
MONOCYTES ABSOLUTE: 0.4 THOU/MM3 (ref 0.4–1.3)
MONOCYTES NFR BLD AUTO: 6.7 %
NEUTROPHILS ABSOLUTE: 4.2 THOU/MM3 (ref 1.8–7.7)
NEUTROPHILS NFR BLD AUTO: 72.4 %
NRBC BLD AUTO-RTO: 0 /100 WBC
PLATELET # BLD AUTO: 359 THOU/MM3 (ref 130–400)
PMV BLD AUTO: 10.7 FL (ref 9.4–12.4)
POTASSIUM SERPL-SCNC: 3.8 MEQ/L (ref 3.5–5.2)
PROT SERPL-MCNC: 7.2 G/DL (ref 6.4–8.3)
RBC # BLD AUTO: 4.66 MILL/MM3 (ref 4.2–5.4)
SODIUM SERPL-SCNC: 140 MEQ/L (ref 135–145)
TRIGL SERPL-MCNC: 107 MG/DL (ref 0–199)
TSH SERPL DL<=0.05 MIU/L-ACNC: 2.99 UIU/ML (ref 0.27–4.2)
VIT B12 SERPL-MCNC: 554 PG/ML (ref 232–1245)
WBC # BLD AUTO: 5.8 THOU/MM3 (ref 4.8–10.8)

## 2025-04-11 ENCOUNTER — LAB (OUTPATIENT)
Dept: LAB | Age: 75
End: 2025-04-11

## 2025-04-11 DIAGNOSIS — E11.22 TYPE 2 DIABETES MELLITUS WITH STAGE 2 CHRONIC KIDNEY DISEASE, WITHOUT LONG-TERM CURRENT USE OF INSULIN (HCC): ICD-10-CM

## 2025-04-11 DIAGNOSIS — N18.2 TYPE 2 DIABETES MELLITUS WITH STAGE 2 CHRONIC KIDNEY DISEASE, WITHOUT LONG-TERM CURRENT USE OF INSULIN (HCC): ICD-10-CM

## 2025-04-11 DIAGNOSIS — I10 PRIMARY HYPERTENSION: ICD-10-CM

## 2025-04-11 LAB
BACTERIA: ABNORMAL
BILIRUB UR QL STRIP: NEGATIVE
CASTS #/AREA URNS LPF: ABNORMAL /LPF
CASTS #/AREA URNS LPF: ABNORMAL /LPF
CHARACTER UR: ABNORMAL
CHARCOAL URNS QL MICRO: ABNORMAL
COLOR UR: YELLOW
CREAT UR-MCNC: 90 MG/DL
CRYSTALS URNS QL MICRO: ABNORMAL
EPITHELIAL CELLS, UA: ABNORMAL /HPF
GLUCOSE UR QL STRIP.AUTO: >= 1000 MG/DL
HGB UR QL STRIP.AUTO: NEGATIVE
KETONES UR QL STRIP.AUTO: NEGATIVE
LEUKOCYTE ESTERASE UR QL STRIP.AUTO: ABNORMAL
MICROALBUMIN UR-MCNC: < 2 MG/DL
MICROALBUMIN/CREAT RATIO PNL UR: 22 MG/G (ref 0–30)
NITRITE UR QL STRIP.AUTO: POSITIVE
PH UR STRIP.AUTO: 5.5 [PH] (ref 5–9)
PROT UR STRIP.AUTO-MCNC: NEGATIVE MG/DL
RBC #/AREA URNS HPF: ABNORMAL /HPF
RENAL EPI CELLS #/AREA URNS HPF: ABNORMAL /[HPF]
SPECIFIC GRAVITY UA: 1.03 (ref 1–1.03)
UROBILINOGEN, URINE: 0.2 EU/DL (ref 0–1)
WBC #/AREA URNS HPF: > 100 /HPF
YEAST LIKE FUNGI URNS QL MICRO: ABNORMAL

## 2025-04-14 ENCOUNTER — OFFICE VISIT (OUTPATIENT)
Dept: FAMILY MEDICINE CLINIC | Age: 75
End: 2025-04-14

## 2025-04-14 ENCOUNTER — TELEPHONE (OUTPATIENT)
Dept: FAMILY MEDICINE CLINIC | Age: 75
End: 2025-04-14

## 2025-04-14 VITALS
OXYGEN SATURATION: 97 % | HEIGHT: 62 IN | HEART RATE: 69 BPM | DIASTOLIC BLOOD PRESSURE: 90 MMHG | TEMPERATURE: 97.9 F | RESPIRATION RATE: 19 BRPM | WEIGHT: 250 LBS | SYSTOLIC BLOOD PRESSURE: 136 MMHG | BODY MASS INDEX: 46.01 KG/M2

## 2025-04-14 DIAGNOSIS — Z29.11 NEED FOR PROPHYLACTIC VACCINATION AND INOCULATION AGAINST RESPIRATORY SYNCYTIAL VIRUS (RSV): ICD-10-CM

## 2025-04-14 DIAGNOSIS — E78.00 HYPERCHOLESTEROLEMIA: ICD-10-CM

## 2025-04-14 DIAGNOSIS — I10 PRIMARY HYPERTENSION: ICD-10-CM

## 2025-04-14 DIAGNOSIS — Z12.31 ENCOUNTER FOR SCREENING MAMMOGRAM FOR BREAST CANCER: ICD-10-CM

## 2025-04-14 DIAGNOSIS — M85.80 OSTEOPENIA, UNSPECIFIED LOCATION: ICD-10-CM

## 2025-04-14 DIAGNOSIS — E28.319 ASYMPTOMATIC PREMATURE MENOPAUSE: ICD-10-CM

## 2025-04-14 DIAGNOSIS — Z23 NEED FOR PROPHYLACTIC VACCINATION AND INOCULATION AGAINST VARICELLA: ICD-10-CM

## 2025-04-14 DIAGNOSIS — Z00.00 ENCOUNTER FOR WELL ADULT EXAM WITHOUT ABNORMAL FINDINGS: Primary | ICD-10-CM

## 2025-04-14 RX ORDER — DAPAGLIFLOZIN 10 MG/1
10 TABLET, FILM COATED ORAL EVERY MORNING
Qty: 90 TABLET | Refills: 3 | Status: SHIPPED | OUTPATIENT
Start: 2025-04-14

## 2025-04-14 RX ORDER — ZOSTER VACCINE RECOMBINANT, ADJUVANTED 50 MCG/0.5
0.5 KIT INTRAMUSCULAR ONCE
Qty: 0.5 ML | Refills: 0 | Status: SHIPPED | OUTPATIENT
Start: 2025-04-14 | End: 2025-04-14

## 2025-04-14 RX ORDER — RESPIRATORY SYNCYTIAL VISUS VACCINE RECOMBINANT, ADJUVANTED 120MCG/0.5
0.5 KIT INTRAMUSCULAR ONCE
Qty: 0.5 ML | Refills: 0 | Status: SHIPPED | OUTPATIENT
Start: 2025-04-14 | End: 2025-04-14

## 2025-04-14 SDOH — ECONOMIC STABILITY: FOOD INSECURITY: WITHIN THE PAST 12 MONTHS, YOU WORRIED THAT YOUR FOOD WOULD RUN OUT BEFORE YOU GOT MONEY TO BUY MORE.: NEVER TRUE

## 2025-04-14 SDOH — ECONOMIC STABILITY: FOOD INSECURITY: WITHIN THE PAST 12 MONTHS, THE FOOD YOU BOUGHT JUST DIDN'T LAST AND YOU DIDN'T HAVE MONEY TO GET MORE.: NEVER TRUE

## 2025-04-14 ASSESSMENT — PATIENT HEALTH QUESTIONNAIRE - PHQ9
SUM OF ALL RESPONSES TO PHQ QUESTIONS 1-9: 0
2. FEELING DOWN, DEPRESSED OR HOPELESS: NOT AT ALL
1. LITTLE INTEREST OR PLEASURE IN DOING THINGS: NOT AT ALL

## 2025-04-14 NOTE — PATIENT INSTRUCTIONS
Well Visit, Over 65: Care Instructions  Well visits can help you stay healthy. Your doctor has checked your overall health and may have suggested ways to take good care of yourself. Your doctor also may have recommended tests. You can help prevent illness with healthy eating, good sleep, vaccinations, regular exercise, and other steps.    Get the tests that you and your doctor decide on. Depending on your age and risks, examples might include hearing tests as well as screening for colon, breast, and lung cancer. Screening helps find diseases before any symptoms appear.   Eat healthy foods. Choose fruits, vegetables, whole grains, lean protein, and low-fat dairy foods. Limit saturated fat, and reduce salt.     Limit alcohol. Men should have no more than 2 drinks a day. Women should have no more than 1. For some people, no alcohol is the best choice.   Exercise. It can help prevent falls. Get at least 30 minutes of exercise on most days of the week. Walking, yoga, and lorena chi can be good choices.     Reach and stay at your healthy weight. This will lower your risk for many health problems.   Take care of your mental health. Try to stay connected with friends, family, and community, and find ways to manage stress.     If you're feeling depressed or hopeless, talk to someone. A counselor can help. If you don't have a counselor, talk to your doctor.   Talk to your doctor if you think you may have a problem with alcohol or drug use. This includes prescription medicines and illegal drugs.     Avoid tobacco and nicotine: Don't smoke, vape, or chew. If you need help quitting, talk to your doctor.   Practice safer sex. Getting tested, using condoms or dental dams, and limiting sex partners can help prevent STIs.     Make an advance directive. This is a legal way to tell your family and doctor what you want to happen at the end of your life or when you can't speak for yourself.   Prevent problems where you can. Protect

## 2025-04-14 NOTE — TELEPHONE ENCOUNTER
Sara Stewart referred Cristina to Shonna Santos to be seen, but has a f/u with Pat 07-09. [facility] doesn't schedule appts for PCP anymore, Please call Cristina to get an appt scheduled.

## 2025-04-14 NOTE — PROGRESS NOTES
Health Maintenance Due   Topic Date Due    Shingles vaccine (1 of 2) Never done    Respiratory Syncytial Virus (RSV) Pregnant or age 60 yrs+ (1 - Risk 60-74 years 1-dose series) Never done    Diabetic retinal exam  02/07/2020    Breast cancer screen  04/20/2023    COVID-19 Vaccine (1 - 2024-25 season) Never done    Diabetic foot exam  11/06/2024    Annual Wellness Visit (Medicare Advantage)  01/01/2025

## 2025-04-14 NOTE — PROGRESS NOTES
Two messages left for patient to call back to schedule for allergy testing. Shani Pinto and Yasmany office numbers left for patient to contact one of our offices to reschedule for allergy testing. She canceled her scheduled appt from 8/8/24.   Well Adult Note    Name: Cristina Elmore Today’s Date: 2025   MRN: [id number] Sex: Female   Age: 74 y.o. Ethnicity: Non- / Non    : 1950 Race: Black / African American      Cristina Elmore is here for a well adult exam.       Assessment & Plan   Encounter for well adult exam without abnormal findings  Osteopenia, unspecified location  -     DEXA BONE DENSITY 2 SITES; Future  Asymptomatic premature menopause  -     DEXA BONE DENSITY 2 SITES; Future  Body mass index [BMI] 45.0-49.9, adult (Z68.42)  Encounter for screening mammogram for breast cancer  -     RADHA Digital Screen Bilateral; Future  Need for prophylactic vaccination and inoculation against respiratory syncytial virus (RSV)  -     respiratory syncytial vaccine, adjuvanted (AREXVY) 120 MCG/0.5ML injection; Inject 0.5 mLs into the muscle once for 1 dose, Disp-0.5 mL, R-0Print  Need for prophylactic vaccination and inoculation against varicella  -     zoster recombinant adjuvanted vaccine (SHINGRIX) 50 MCG/0.5ML SUSR injection; Inject 0.5 mLs into the muscle once for 1 dose, Disp-0.5 mL, R-0Print        Return in about 6 months (around 10/14/2025).       Subjective   History:    Recent blood test showed a LDL of 253, total cholesterol 331, HDL 57.  She admits that she tried to see if the Lipitor was really helping or she could get off of it.  She stopped it 2 months before the blood test.   HbA1C was 6.4%.  Mag and B 12 were normal.  Urine showed 5-10 epithelial cells, so not a good sample..  She is asymptomatic.  Albumin/creatinine ratio 22. Vitamin D 39. B12 was 554. TSH 2.99.       Prior to Visit Medications    Medication Sig Taking? Authorizing Provider   dapagliflozin (FARXIGA) 10 MG tablet Take 1 tablet by mouth every morning Yes Sara Stewart MD   zoster recombinant adjuvanted vaccine (SHINGRIX) 50 MCG/0.5ML SUSR injection Inject 0.5 mLs into the muscle once for 1 dose Yes Sara Stewart MD   respiratory syncytial vaccine,

## 2025-04-15 NOTE — TELEPHONE ENCOUNTER
Left detailed message with patient to call back if she is still interested in scheduling with this office.

## 2025-05-30 DIAGNOSIS — E78.00 HYPERCHOLESTEROLEMIA: ICD-10-CM

## 2025-05-30 NOTE — TELEPHONE ENCOUNTER
Medication Refill Request .     Please advise .      Patient's last appointment was: 4/14/2025  with our   Patient's next appointment is: 7/9/2025  with our .   Last refilled on: 6/3/2024   Which pharmacy does the script need sent to: [facility]        Lab Results   Component Value Date    LABA1C 6.4 (H) 04/10/2025     Lab Results   Component Value Date    CHOL 331 (H) 04/10/2025    TRIG 107 04/10/2025    HDL 57 04/10/2025     Lab Results   Component Value Date     04/10/2025    K 3.8 04/10/2025     04/10/2025    CO2 21 (L) 04/10/2025    BUN 17 04/10/2025    CREATININE 0.9 04/10/2025    GLUCOSE 139 (H) 04/10/2025    CALCIUM 9.9 04/10/2025    BILITOT <0.2 (L) 04/10/2025    ALKPHOS 92 04/10/2025    AST 16 04/10/2025    ALT 8 (L) 04/10/2025    LABGLOM 67 04/10/2025     Lab Results   Component Value Date    TSH 2.99 04/10/2025    T4FREE 1.25 11/06/2023     Lab Results   Component Value Date    WBC 5.8 04/10/2025    HGB 14.3 04/10/2025    HCT 44.3 04/10/2025    MCV 95.1 04/10/2025     04/10/2025

## 2025-06-03 DIAGNOSIS — N18.2 CKD (CHRONIC KIDNEY DISEASE), STAGE II: ICD-10-CM

## 2025-06-03 DIAGNOSIS — I10 PRIMARY HYPERTENSION: Primary | ICD-10-CM

## 2025-06-04 ENCOUNTER — LAB (OUTPATIENT)
Dept: LAB | Age: 75
End: 2025-06-04

## 2025-06-04 DIAGNOSIS — I10 PRIMARY HYPERTENSION: ICD-10-CM

## 2025-06-04 DIAGNOSIS — N18.2 CKD (CHRONIC KIDNEY DISEASE), STAGE II: ICD-10-CM

## 2025-06-04 LAB
ANION GAP SERPL CALC-SCNC: 14 MEQ/L (ref 8–16)
BUN SERPL-MCNC: 17 MG/DL (ref 8–23)
CALCIUM SERPL-MCNC: 9.9 MG/DL (ref 8.8–10.2)
CHLORIDE SERPL-SCNC: 106 MEQ/L (ref 98–111)
CO2 SERPL-SCNC: 20 MEQ/L (ref 22–29)
CREAT SERPL-MCNC: 0.8 MG/DL (ref 0.5–0.9)
CREAT UR-MCNC: 81.4 MG/DL
GFR SERPL CREATININE-BSD FRML MDRD: 77 ML/MIN/1.73M2
GLUCOSE SERPL-MCNC: 108 MG/DL (ref 74–109)
MICROALBUMIN UR-MCNC: < 2 MG/DL
MICROALBUMIN/CREAT RATIO PNL UR: 25 MG/G (ref 0–30)
POTASSIUM SERPL-SCNC: 4.2 MEQ/L (ref 3.5–5.2)
SODIUM SERPL-SCNC: 140 MEQ/L (ref 135–145)

## 2025-06-04 RX ORDER — ATORVASTATIN CALCIUM 80 MG/1
80 TABLET, FILM COATED ORAL DAILY
Qty: 90 TABLET | Refills: 3 | Status: SHIPPED | OUTPATIENT
Start: 2025-06-04

## 2025-06-05 ENCOUNTER — OFFICE VISIT (OUTPATIENT)
Dept: NEPHROLOGY | Age: 75
End: 2025-06-05
Payer: MEDICARE

## 2025-06-05 VITALS
WEIGHT: 253 LBS | OXYGEN SATURATION: 98 % | SYSTOLIC BLOOD PRESSURE: 142 MMHG | DIASTOLIC BLOOD PRESSURE: 80 MMHG | HEIGHT: 62 IN | BODY MASS INDEX: 46.56 KG/M2 | HEART RATE: 73 BPM

## 2025-06-05 DIAGNOSIS — R80.9 ALBUMINURIA: ICD-10-CM

## 2025-06-05 DIAGNOSIS — I10 PRIMARY HYPERTENSION: Primary | ICD-10-CM

## 2025-06-05 PROCEDURE — 1159F MED LIST DOCD IN RCRD: CPT | Performed by: INTERNAL MEDICINE

## 2025-06-05 PROCEDURE — 1123F ACP DISCUSS/DSCN MKR DOCD: CPT | Performed by: INTERNAL MEDICINE

## 2025-06-05 PROCEDURE — 3077F SYST BP >= 140 MM HG: CPT | Performed by: INTERNAL MEDICINE

## 2025-06-05 PROCEDURE — 3079F DIAST BP 80-89 MM HG: CPT | Performed by: INTERNAL MEDICINE

## 2025-06-05 PROCEDURE — 99213 OFFICE O/P EST LOW 20 MIN: CPT | Performed by: INTERNAL MEDICINE

## 2025-06-05 NOTE — PROGRESS NOTES
trace ankle LE edema     Medications:  Current Outpatient Medications   Medication Sig Dispense Refill    atorvastatin (LIPITOR) 80 MG tablet TAKE 1 TABLET DAILY 90 tablet 3    metoprolol (LOPRESSOR) 100 MG tablet TAKE 1 TABLET TWICE A  tablet 3    NIFEdipine (ADALAT CC) 90 MG extended release tablet TAKE 1 TABLET TWICE A  tablet 3    hydrALAZINE (APRESOLINE) 50 MG tablet TAKE 1 TABLET TWICE A  tablet 3    losartan (COZAAR) 50 MG tablet TAKE 1 TABLET TWICE A  tablet 3    metFORMIN (GLUCOPHAGE) 850 MG tablet TAKE 1 TABLET TWICE A DAY WITH MEALS 180 tablet 3    hydroCHLOROthiazide (HYDRODIURIL) 25 MG tablet TAKE 1 TABLET DAILY 90 tablet 3    Blood Glucose Monitoring Suppl (ONETOUCH VERIO) w/Device KIT Use three times daily as directed 1 kit 0    blood glucose test strips (ONETOUCH VERIO) strip Use to test blood sugars up to three times a day. 100 each 5    Handicap Placard MISC by Does not apply route 1 each 0    Cyanocobalamin (B-12 PO) Take by mouth daily      Cholecalciferol (VITAMIN D3 PO) Take by mouth daily      OLIVE LEAF EXTRACT PO Take by mouth daily      ELDERBERRY PO Take by mouth      aspirin 81 MG tablet Take 1 tablet by mouth daily      dapagliflozin (FARXIGA) 10 MG tablet Take 1 tablet by mouth every morning (Patient not taking: Reported on 6/5/2025) 90 tablet 3     No current facility-administered medications for this visit.        Laboratory & Diagnostics:  Sept 2018: Creat 0.9, K 4.6  Sept 2017: UA: trace blood, trace protein UPCR 49 mg/g     Dec 2018: R 9.9, L 10.2, no renal artery stenosis  Feb 2019: , UPCR 220 mg/g, AIC 7%  Aldosterone 9.6, renin less than 0.167    Feb 2019: K 4.5, Creat 0.73, , UPCR 220 mg/g  Aug 2019: creat 0.93, UACR 44 mg/g  February 2020. UA: (-) Blood/protein  March 2020 immunofixation negative  Sept 2020: K 4.2, Creat 0.7, UPCR 230 mg/g  April 2021: K 3.7, creat 0.8, Albumin 4.0, UPCR 240 mg/g, immunofixation normal, UA no blood, no

## 2025-07-09 ENCOUNTER — OFFICE VISIT (OUTPATIENT)
Dept: FAMILY MEDICINE CLINIC | Age: 75
End: 2025-07-09

## 2025-07-09 VITALS
SYSTOLIC BLOOD PRESSURE: 118 MMHG | HEIGHT: 62 IN | BODY MASS INDEX: 46.7 KG/M2 | HEART RATE: 76 BPM | RESPIRATION RATE: 16 BRPM | TEMPERATURE: 97.5 F | WEIGHT: 253.8 LBS | OXYGEN SATURATION: 99 % | DIASTOLIC BLOOD PRESSURE: 64 MMHG

## 2025-07-09 DIAGNOSIS — N18.2 TYPE 2 DIABETES MELLITUS WITH STAGE 2 CHRONIC KIDNEY DISEASE, WITHOUT LONG-TERM CURRENT USE OF INSULIN (HCC): ICD-10-CM

## 2025-07-09 DIAGNOSIS — G56.03 CARPAL TUNNEL SYNDROME ON BOTH SIDES: ICD-10-CM

## 2025-07-09 DIAGNOSIS — E78.00 HYPERCHOLESTEROLEMIA: ICD-10-CM

## 2025-07-09 DIAGNOSIS — I10 PRIMARY HYPERTENSION: Primary | ICD-10-CM

## 2025-07-09 DIAGNOSIS — E11.22 TYPE 2 DIABETES MELLITUS WITH STAGE 2 CHRONIC KIDNEY DISEASE, WITHOUT LONG-TERM CURRENT USE OF INSULIN (HCC): ICD-10-CM

## 2025-07-09 DIAGNOSIS — E53.8 VITAMIN B 12 DEFICIENCY: ICD-10-CM

## 2025-07-09 DIAGNOSIS — R80.9 MICROALBUMINURIA DUE TO TYPE 2 DIABETES MELLITUS (HCC): ICD-10-CM

## 2025-07-09 DIAGNOSIS — E11.29 MICROALBUMINURIA DUE TO TYPE 2 DIABETES MELLITUS (HCC): ICD-10-CM

## 2025-07-09 DIAGNOSIS — E55.9 VITAMIN D DEFICIENCY: ICD-10-CM

## 2025-07-09 NOTE — PROGRESS NOTES
[facility] PROFESSIONAL [facility] [place] [facility]  [address]  [facility] [address]  Dept: [phone]  Dept Fax: [phone]  Loc: [phone]     Provider:  Sara Stewart MD    Patient:  Cristina Elmore  YOB: 1950      Visit Date:  7/9/2025     Reason For Visit:   Chief Complaint   Patient presents with    3 Month Follow-Up        Cristina is a 74 y.o. female who comes today to the office for follow up DM, HTN, Cholesterol, CKD, B 12 and D deficiency.    She states she has been doing well, taking her medications as prescribed.  She denies any side effects from her medications. Denies changes in her  past medical, surgical, family or social hx.    Last visit in January she was complaining of tingling of both hands (R>L) mainly 2nd, third and fourth fingers, and trigger finger of the 3rd digit of both hands which happened mainly when she tries to open a jar.  I ordered a NCT and refer her to Ortho, but she did not heard from either office.  Symptoms are the same.  She has an appointment with Dr. Santos in October so I will reorder the NCT.  Meanwhile she has been advised to use nocturnal extension splint and do exercises (written info provided). .       Diabetes Mellitus: she is taking  Metformin 850  mg p.o. twice daily.   She could not afford the Farxiga 10 mg 1 tablet PO daily. Her HbA1c was 6.4% in April 10, 2025.   She denies any side effects from the medication.  She is up to date on Pneumovax, Tdap and Prevnar.  She does not get the influenza vaccine. She does not want to get the Covid vaccine either.         Hypertension:  she is taking Losartan 50 mg 1 PO BID, HCTZ 25 mg 1 PO daily as needed, Hydralazine 50 mg 1 tablet PO BID, Nifedipine CC 90 mg 1 PO BID, and Metoprolol 100 mg 1 PO BID.  She denies any side effects from the medications.  She denies chest pain, dyspnea, exertional chest pressure/discomfort, fatigue, irregular heart beat,

## 2025-07-11 ENCOUNTER — LAB (OUTPATIENT)
Dept: LAB | Age: 75
End: 2025-07-11

## 2025-07-11 DIAGNOSIS — E55.9 VITAMIN D DEFICIENCY: ICD-10-CM

## 2025-07-11 DIAGNOSIS — G56.03 CARPAL TUNNEL SYNDROME ON BOTH SIDES: ICD-10-CM

## 2025-07-11 DIAGNOSIS — E78.00 HYPERCHOLESTEROLEMIA: ICD-10-CM

## 2025-07-11 LAB
25(OH)D3 SERPL-MCNC: 39 NG/ML (ref 30–100)
ALBUMIN SERPL BCG-MCNC: 3.6 G/DL (ref 3.4–4.9)
ALP SERPL-CCNC: 95 U/L (ref 38–126)
ALT SERPL W/O P-5'-P-CCNC: 13 U/L (ref 10–35)
AST SERPL-CCNC: 16 U/L (ref 10–35)
BILIRUB CONJ SERPL-MCNC: < 0.1 MG/DL (ref 0–0.2)
BILIRUB SERPL-MCNC: 0.3 MG/DL (ref 0.3–1.2)
CHOLESTEROL, FASTING: 197 MG/DL (ref 100–199)
HDLC SERPL-MCNC: 56 MG/DL
LDLC SERPL CALC-MCNC: 123 MG/DL
PROT SERPL-MCNC: 7 G/DL (ref 6.4–8.3)
TRIGLYCERIDE, FASTING: 89 MG/DL (ref 0–199)

## 2025-07-15 ENCOUNTER — HOSPITAL ENCOUNTER (OUTPATIENT)
Dept: WOMENS IMAGING | Age: 75
Discharge: HOME | End: 2025-07-15
Attending: FAMILY MEDICINE
Payer: MEDICARE

## 2025-07-15 DIAGNOSIS — Z12.31 ENCOUNTER FOR SCREENING MAMMOGRAM FOR BREAST CANCER: ICD-10-CM

## 2025-07-15 DIAGNOSIS — E28.319 ASYMPTOMATIC PREMATURE MENOPAUSE: ICD-10-CM

## 2025-07-15 DIAGNOSIS — M85.80 OSTEOPENIA, UNSPECIFIED LOCATION: ICD-10-CM

## 2025-07-15 PROCEDURE — 77067 SCR MAMMO BI INCL CAD: CPT

## 2025-07-15 PROCEDURE — 77080 DXA BONE DENSITY AXIAL: CPT
